# Patient Record
Sex: FEMALE | Race: OTHER | NOT HISPANIC OR LATINO | Employment: FULL TIME | URBAN - METROPOLITAN AREA
[De-identification: names, ages, dates, MRNs, and addresses within clinical notes are randomized per-mention and may not be internally consistent; named-entity substitution may affect disease eponyms.]

---

## 2017-07-10 ENCOUNTER — APPOINTMENT (OUTPATIENT)
Dept: LAB | Facility: CLINIC | Age: 42
End: 2017-07-10

## 2017-07-10 ENCOUNTER — ALLSCRIPTS OFFICE VISIT (OUTPATIENT)
Dept: OTHER | Facility: OTHER | Age: 42
End: 2017-07-10

## 2017-07-10 DIAGNOSIS — R30.0 DYSURIA: ICD-10-CM

## 2017-07-10 DIAGNOSIS — E11.9 TYPE 2 DIABETES MELLITUS WITHOUT COMPLICATIONS (HCC): ICD-10-CM

## 2017-07-10 LAB
ALBUMIN SERPL BCP-MCNC: 3.3 G/DL (ref 3.5–5)
ALP SERPL-CCNC: 66 U/L (ref 46–116)
ALT SERPL W P-5'-P-CCNC: 17 U/L (ref 12–78)
ANION GAP SERPL CALCULATED.3IONS-SCNC: 6 MMOL/L (ref 4–13)
AST SERPL W P-5'-P-CCNC: 9 U/L (ref 5–45)
BASOPHILS # BLD AUTO: 0.05 THOUSANDS/ΜL (ref 0–0.1)
BASOPHILS NFR BLD AUTO: 1 % (ref 0–1)
BILIRUB SERPL-MCNC: 0.36 MG/DL (ref 0.2–1)
BILIRUB UR QL STRIP: NEGATIVE
BUN SERPL-MCNC: 5 MG/DL (ref 5–25)
CALCIUM SERPL-MCNC: 9 MG/DL (ref 8.3–10.1)
CHLORIDE SERPL-SCNC: 104 MMOL/L (ref 100–108)
CHOLEST SERPL-MCNC: 165 MG/DL (ref 50–200)
CLARITY UR: CLEAR
CO2 SERPL-SCNC: 28 MMOL/L (ref 21–32)
COLOR UR: YELLOW
CREAT SERPL-MCNC: 0.56 MG/DL (ref 0.6–1.3)
EOSINOPHIL # BLD AUTO: 0.46 THOUSAND/ΜL (ref 0–0.61)
EOSINOPHIL NFR BLD AUTO: 5 % (ref 0–6)
ERYTHROCYTE [DISTWIDTH] IN BLOOD BY AUTOMATED COUNT: 15.2 % (ref 11.6–15.1)
EST. AVERAGE GLUCOSE BLD GHB EST-MCNC: 166 MG/DL
GFR SERPL CREATININE-BSD FRML MDRD: >60 ML/MIN/1.73SQ M
GLUCOSE P FAST SERPL-MCNC: 120 MG/DL (ref 65–99)
GLUCOSE UR STRIP-MCNC: NEGATIVE MG/DL
HBA1C MFR BLD: 7.4 % (ref 4.2–6.3)
HCT VFR BLD AUTO: 32.7 % (ref 34.8–46.1)
HDLC SERPL-MCNC: 49 MG/DL (ref 40–60)
HGB BLD-MCNC: 10.3 G/DL (ref 11.5–15.4)
HGB UR QL STRIP.AUTO: NEGATIVE
KETONES UR STRIP-MCNC: NEGATIVE MG/DL
LDLC SERPL CALC-MCNC: 91 MG/DL (ref 0–100)
LEUKOCYTE ESTERASE UR QL STRIP: NEGATIVE
LYMPHOCYTES # BLD AUTO: 3.41 THOUSANDS/ΜL (ref 0.6–4.47)
LYMPHOCYTES NFR BLD AUTO: 37 % (ref 14–44)
MCH RBC QN AUTO: 24.3 PG (ref 26.8–34.3)
MCHC RBC AUTO-ENTMCNC: 31.5 G/DL (ref 31.4–37.4)
MCV RBC AUTO: 77 FL (ref 82–98)
MONOCYTES # BLD AUTO: 0.38 THOUSAND/ΜL (ref 0.17–1.22)
MONOCYTES NFR BLD AUTO: 4 % (ref 4–12)
NEUTROPHILS # BLD AUTO: 4.88 THOUSANDS/ΜL (ref 1.85–7.62)
NEUTS SEG NFR BLD AUTO: 53 % (ref 43–75)
NITRITE UR QL STRIP: NEGATIVE
NRBC BLD AUTO-RTO: 0 /100 WBCS
PH UR STRIP.AUTO: 7 [PH] (ref 4.5–8)
PLATELET # BLD AUTO: 438 THOUSANDS/UL (ref 149–390)
PMV BLD AUTO: 9.8 FL (ref 8.9–12.7)
POTASSIUM SERPL-SCNC: 3.8 MMOL/L (ref 3.5–5.3)
PROT SERPL-MCNC: 7.7 G/DL (ref 6.4–8.2)
PROT UR STRIP-MCNC: NEGATIVE MG/DL
RBC # BLD AUTO: 4.24 MILLION/UL (ref 3.81–5.12)
SODIUM SERPL-SCNC: 138 MMOL/L (ref 136–145)
SP GR UR STRIP.AUTO: 1.01 (ref 1–1.03)
TRIGL SERPL-MCNC: 123 MG/DL
UROBILINOGEN UR QL STRIP.AUTO: 0.2 E.U./DL
WBC # BLD AUTO: 9.2 THOUSAND/UL (ref 4.31–10.16)

## 2017-07-10 PROCEDURE — 83036 HEMOGLOBIN GLYCOSYLATED A1C: CPT

## 2017-07-10 PROCEDURE — 36415 COLL VENOUS BLD VENIPUNCTURE: CPT

## 2017-07-10 PROCEDURE — 80061 LIPID PANEL: CPT

## 2017-07-10 PROCEDURE — 81003 URINALYSIS AUTO W/O SCOPE: CPT

## 2017-07-10 PROCEDURE — 80053 COMPREHEN METABOLIC PANEL: CPT

## 2017-07-10 PROCEDURE — 85025 COMPLETE CBC W/AUTO DIFF WBC: CPT

## 2018-01-15 VITALS
TEMPERATURE: 97.9 F | DIASTOLIC BLOOD PRESSURE: 72 MMHG | HEIGHT: 62 IN | BODY MASS INDEX: 27.02 KG/M2 | HEART RATE: 62 BPM | WEIGHT: 146.83 LBS | SYSTOLIC BLOOD PRESSURE: 108 MMHG

## 2018-07-23 RX ORDER — MELOXICAM 7.5 MG/1
7.5 TABLET ORAL
COMMUNITY
Start: 2018-05-21 | End: 2018-07-24 | Stop reason: ALTCHOICE

## 2018-07-23 RX ORDER — TRIAMCINOLONE ACETONIDE 1 MG/G
CREAM TOPICAL
COMMUNITY
Start: 2018-04-05 | End: 2018-07-24 | Stop reason: ALTCHOICE

## 2018-07-24 ENCOUNTER — OFFICE VISIT (OUTPATIENT)
Dept: FAMILY MEDICINE CLINIC | Facility: CLINIC | Age: 43
End: 2018-07-24
Payer: COMMERCIAL

## 2018-07-24 VITALS
HEIGHT: 62 IN | BODY MASS INDEX: 27.05 KG/M2 | WEIGHT: 147 LBS | RESPIRATION RATE: 16 BRPM | SYSTOLIC BLOOD PRESSURE: 110 MMHG | OXYGEN SATURATION: 98 % | HEART RATE: 88 BPM | DIASTOLIC BLOOD PRESSURE: 70 MMHG

## 2018-07-24 DIAGNOSIS — R35.0 URINE FREQUENCY: ICD-10-CM

## 2018-07-24 DIAGNOSIS — J35.8 TONSILLOLITH: ICD-10-CM

## 2018-07-24 DIAGNOSIS — E78.2 MIXED HYPERLIPIDEMIA: ICD-10-CM

## 2018-07-24 DIAGNOSIS — E55.9 VITAMIN D DEFICIENCY: ICD-10-CM

## 2018-07-24 DIAGNOSIS — F32.0 CURRENT MILD EPISODE OF MAJOR DEPRESSIVE DISORDER WITHOUT PRIOR EPISODE (HCC): ICD-10-CM

## 2018-07-24 DIAGNOSIS — E04.9 GOITER: ICD-10-CM

## 2018-07-24 DIAGNOSIS — E11.9 TYPE 2 DIABETES MELLITUS WITHOUT COMPLICATION, WITHOUT LONG-TERM CURRENT USE OF INSULIN (HCC): Primary | ICD-10-CM

## 2018-07-24 PROCEDURE — 99204 OFFICE O/P NEW MOD 45 MIN: CPT | Performed by: FAMILY MEDICINE

## 2018-07-24 RX ORDER — ERGOCALCIFEROL 1.25 MG/1
50000 CAPSULE ORAL WEEKLY
Refills: 0 | COMMUNITY
Start: 2018-04-20 | End: 2018-08-07 | Stop reason: ALTCHOICE

## 2018-07-24 RX ORDER — TIZANIDINE 4 MG/1
4 TABLET ORAL EVERY 8 HOURS PRN
Refills: 0 | COMMUNITY
Start: 2018-04-20 | End: 2018-07-24 | Stop reason: ALTCHOICE

## 2018-07-24 NOTE — PROGRESS NOTES
Assessment/Plan:  PHQ-9 Depression Screening    PHQ-9:    Frequency of the following problems over the past two weeks:       Little interest or pleasure in doing things:  3 - nearly every day  Feeling down, depressed, or hopeless:  3 - nearly every day  Trouble falling or staying asleep, or sleeping too much:  0 - not at all  Feeling tired or having little energy:  3 - nearly every day  Poor appetite or overeatin - more than half the days  Feeling bad about yourself - or that you are a failure or have let yourself or your family down:  3 - nearly every day  Trouble concentrating on things, such as reading the newspaper or watching television:  0 - not at all  Moving or speaking so slowly that other people could have noticed  Or the opposite - being so fidgety or restless that you have been moving around a lot more than usual:  0 - not at all  Thoughts that you would be better off dead, or of hurting yourself in some way:  0 - not at all  PHQ-2 Score:  6  PHQ-9 Score:  14         Problem List Items Addressed This Visit        Digestive    Tonsillolith    Relevant Orders    Ambulatory Referral to Otolaryngology       Endocrine    Type 2 diabetes mellitus without complication ([facility] ) - Primary    Relevant Orders    CBC and differential    Comprehensive metabolic panel    Hemoglobin A1C    Microalbumin / creatinine urine ratio    Goiter    Relevant Orders    TSH, 3rd generation    US thyroid       Other    Hyperlipidemia    Relevant Orders    Lipid panel    Urine frequency    Relevant Orders    UA w Reflex to Microscopic w Reflex to Culture -Lab Collect    Vitamin D deficiency    Relevant Orders    Vitamin D 25 hydroxy    Current mild episode of major depressive disorder without prior episode ([facility] )    Relevant Orders    Ambulatory referral to behavioral health therapists          Chief Complaint   Patient presents with    Establish Care       Subjective:   Patient ID: Makayla Lopez is a 38 y o  female      She is a new patient, she says she is diabetic , she is on metformin twice a day of for more than a year and her previous blood work was done more than a year ago, and she was poorly controlled diabetic with high A1c,   she complain of tonsil stone  Intermittently and she wants it to be checked   she had URI for 1 month and now she is feeling better still little sore throat  And she also has noted more prominence of her thyroid since then     History of on and off right-sided back pain in the kidney area, and frequency of urine  Uncontrolled diabetes on metformin twice a day 500 mg    she takes high dose vitamin-D once a week and she is out of that for last 1 week  she has history of anemia but she does not know the kind of anemia  she has history of depression and she feels less motivation, previously diagnosed depression but she is not seeing any counselor and she is not on any medication, she denies any suicidal or homicidal thoughts and 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,   she has 4 children   all normal deliveries and she has tubal ligation        Review of Systems   Constitutional: Negative for activity change, appetite change, chills, diaphoresis, fatigue, fever and unexpected weight change  HENT: Negative for congestion, dental problem, ear discharge, ear pain, facial swelling, hearing loss, mouth sores, nosebleeds, postnasal drip, rhinorrhea, sinus pain, sinus pressure, sneezing, sore throat, trouble swallowing and voice change  Tonsillar stone on and off    Eyes: Negative for photophobia, pain, discharge, redness and itching  Respiratory: Negative for cough, chest tightness, shortness of breath and wheezing  Cardiovascular: Negative for chest pain, palpitations and leg swelling  Gastrointestinal: Negative for abdominal distention, abdominal pain, blood in stool, constipation, diarrhea and nausea     Endocrine: Negative for cold intolerance, heat intolerance, polydipsia, polyphagia and polyuria  Genitourinary: Positive for flank pain and frequency  Negative for dysuria, hematuria and urgency  Musculoskeletal: Negative for arthralgias, back pain, myalgias and neck pain  Skin: Negative for color change and pallor  Allergic/Immunologic: Negative for environmental allergies and food allergies  Neurological: Negative for dizziness, weakness, light-headedness, numbness and headaches  Hematological: Negative for adenopathy  Does not bruise/bleed easily  Psychiatric/Behavioral: Negative for behavioral problems, sleep disturbance and suicidal ideas  The patient is not nervous/anxious  Objective:  Physical Exam   Constitutional: She is oriented to person, place, and time  She appears well-developed and well-nourished  HENT:   Head: Normocephalic and atraumatic  Right Ear: External ear normal    Left Ear: External ear normal    Nose: Nose normal    Mouth/Throat: Oropharynx is clear and moist  No oropharyngeal exudate  Eyes: Conjunctivae and EOM are normal  Pupils are equal, round, and reactive to light  Right eye exhibits no discharge  Left eye exhibits no discharge  No scleral icterus  Neck: Normal range of motion  Neck supple  No tracheal deviation present  No thyromegaly present  Cardiovascular: Normal rate, regular rhythm and normal heart sounds  No murmur heard  Pulses:       Dorsalis pedis pulses are 2+ on the right side, and 2+ on the left side  Pulmonary/Chest: Effort normal and breath sounds normal  No respiratory distress  She has no wheezes  She has no rales  Abdominal: Soft  Bowel sounds are normal  She exhibits no distension and no mass  There is no tenderness  There is no rebound  Musculoskeletal: Normal range of motion  She exhibits no edema  Feet:   Right Foot:   Skin Integrity: Negative for ulcer, skin breakdown, erythema, warmth, callus or dry skin     Left Foot:   Skin Integrity: Negative for ulcer, skin breakdown, erythema, warmth, callus or dry skin  Lymphadenopathy:     She has no cervical adenopathy  Neurological: She is alert and oriented to person, place, and time  She has normal reflexes  No cranial nerve deficit  Skin: Skin is warm  No rash noted  No erythema  No pallor  Psychiatric: She has a normal mood and affect  Her behavior is normal  Judgment and thought content normal    Nursing note and vitals reviewed  Patient's shoes and socks removed  Right Foot/Ankle   Right Foot Inspection  Skin Exam: skin normal skin not intact, no dry skin, no warmth, no callus, no erythema, no maceration, no abnormal color, no pre-ulcer, no ulcer and no callus                          Toe Exam: ROM and strength within normal limits  Sensory   Vibration: intact  Proprioception: intact   Monofilament testing: intact  Vascular  Capillary refills: < 3 seconds  The right DP pulse is 2+  Left Foot/Ankle  Left Foot Inspection  Skin Exam: skin normalskin not intact, no dry skin, no warmth, no erythema, no maceration, normal color, no pre-ulcer, no ulcer and no callus                         Toe Exam: ROM and strength within normal limits                   Sensory   Vibration: intact  Proprioception: intact  Monofilament: intact  Vascular  Capillary refills: < 3 seconds  The left DP pulse is 2+     Assign Risk Category:  ; ;        Risk: 0      Past Surgical History:   Procedure Laterality Date    TUBAL LIGATION      last assessed 7/10/17       Family History   Problem Relation Age of Onset    Diabetes Mother     Hypertension Mother     Heart disease Father         myocardial infarction    Diabetes Sister     Hypertension Sister     Diabetes Maternal Grandmother     Hypertension Maternal Grandmother     Substance Abuse Neg Hx     Mental illness Neg Hx          Current Outpatient Prescriptions:     metFORMIN (GLUCOPHAGE) 500 mg tablet, Take 500 mg by mouth, Disp: , Rfl:     ergocalciferol (VITAMIN D2) 50,000 units, Take 50,000 Units by mouth once a week, Disp: , Rfl: 0    Allergies   Allergen Reactions    Pollen Extract        Vitals:    07/24/18 0928   BP: 110/70   Pulse: 88   Resp: 16   SpO2: 98%   Weight: 66 7 kg (147 lb)   Height: 5' 2" (1 575 m)

## 2018-07-25 ENCOUNTER — HOSPITAL ENCOUNTER (OUTPATIENT)
Dept: ULTRASOUND IMAGING | Facility: HOSPITAL | Age: 43
Discharge: HOME | End: 2018-07-25
Attending: FAMILY MEDICINE
Payer: COMMERCIAL

## 2018-07-25 DIAGNOSIS — E04.9 GOITER: ICD-10-CM

## 2018-07-25 PROCEDURE — 76536 US EXAM OF HEAD AND NECK: CPT

## 2018-07-27 ENCOUNTER — TELEPHONE (OUTPATIENT)
Dept: FAMILY MEDICINE CLINIC | Facility: CLINIC | Age: 43
End: 2018-07-27

## 2018-07-30 NOTE — TELEPHONE ENCOUNTER
Spoke with her  that she has small nodule , will monitor ,she felt discomfort after US, advised ibuprofen prn and has f/u next wk

## 2018-08-02 LAB
25(OH)D3+25(OH)D2 SERPL-MCNC: 42.7 NG/ML (ref 30–100)
ALBUMIN SERPL-MCNC: 3.9 G/DL (ref 3.5–5.5)
ALBUMIN/CREAT UR: 26.7 MG/G CREAT (ref 0–30)
ALBUMIN/GLOB SERPL: 1.1 {RATIO} (ref 1.2–2.2)
ALP SERPL-CCNC: 77 IU/L (ref 39–117)
ALT SERPL-CCNC: 8 IU/L (ref 0–32)
AMBIG ABBREV DEFAULT: NORMAL
AMBIG ABBREV DEFAULT: NORMAL
APPEARANCE UR: ABNORMAL
AST SERPL-CCNC: 13 IU/L (ref 0–40)
BACTERIA UR CULT: NORMAL
BACTERIA URNS QL MICRO: ABNORMAL
BASOPHILS # BLD AUTO: 0 X10E3/UL (ref 0–0.2)
BASOPHILS NFR BLD AUTO: 0 %
BILIRUB SERPL-MCNC: 0.3 MG/DL (ref 0–1.2)
BILIRUB UR QL STRIP: NEGATIVE
BUN SERPL-MCNC: 8 MG/DL (ref 6–24)
BUN/CREAT SERPL: 14 (ref 9–23)
CALCIUM SERPL-MCNC: 9.8 MG/DL (ref 8.7–10.2)
CASTS URNS MICRO: ABNORMAL
CASTS URNS QL MICRO: PRESENT /LPF
CHLORIDE SERPL-SCNC: 101 MMOL/L (ref 96–106)
CHOLEST SERPL-MCNC: 161 MG/DL (ref 100–199)
CO2 SERPL-SCNC: 24 MMOL/L (ref 20–29)
COLOR UR: YELLOW
CREAT SERPL-MCNC: 0.58 MG/DL (ref 0.57–1)
CREAT UR-MCNC: 192.1 MG/DL
EOSINOPHIL # BLD AUTO: 0.1 X10E3/UL (ref 0–0.4)
EOSINOPHIL NFR BLD AUTO: 1 %
EPI CELLS #/AREA URNS HPF: >10 /HPF
ERYTHROCYTE [DISTWIDTH] IN BLOOD BY AUTOMATED COUNT: 14.1 % (ref 12.3–15.4)
GLOBULIN SER-MCNC: 3.7 G/DL (ref 1.5–4.5)
GLUCOSE SERPL-MCNC: 148 MG/DL (ref 65–99)
GLUCOSE UR QL: NEGATIVE
HBA1C MFR BLD: 6.9 % (ref 4.8–5.6)
HCT VFR BLD AUTO: 31.7 % (ref 34–46.6)
HDLC SERPL-MCNC: 46 MG/DL
HGB BLD-MCNC: 10.1 G/DL (ref 11.1–15.9)
HGB UR QL STRIP: ABNORMAL
IMM GRANULOCYTES # BLD: 0 X10E3/UL (ref 0–0.1)
IMM GRANULOCYTES NFR BLD: 0 %
KETONES UR QL STRIP: NEGATIVE
LDLC SERPL CALC-MCNC: 89 MG/DL (ref 0–99)
LEUKOCYTE ESTERASE UR QL STRIP: ABNORMAL
LYMPHOCYTES # BLD AUTO: 3.1 X10E3/UL (ref 0.7–3.1)
LYMPHOCYTES NFR BLD AUTO: 34 %
Lab: NO GROWTH
MCH RBC QN AUTO: 24.3 PG (ref 26.6–33)
MCHC RBC AUTO-ENTMCNC: 31.9 G/DL (ref 31.5–35.7)
MCV RBC AUTO: 76 FL (ref 79–97)
MICRO URNS: ABNORMAL
MICROALBUMIN UR-MCNC: 51.2 UG/ML
MONOCYTES # BLD AUTO: 0.5 X10E3/UL (ref 0.1–0.9)
MONOCYTES NFR BLD AUTO: 5 %
MUCOUS THREADS URNS QL MICRO: PRESENT
NEUTROPHILS # BLD AUTO: 5.4 X10E3/UL (ref 1.4–7)
NEUTROPHILS NFR BLD AUTO: 60 %
NITRITE UR QL STRIP: NEGATIVE
PH UR STRIP: 6.5 [PH] (ref 5–7.5)
PLATELET # BLD AUTO: 554 X10E3/UL (ref 150–379)
POTASSIUM SERPL-SCNC: 5.1 MMOL/L (ref 3.5–5.2)
PROT SERPL-MCNC: 7.6 G/DL (ref 6–8.5)
PROT UR QL STRIP: ABNORMAL
RBC # BLD AUTO: 4.15 X10E6/UL (ref 3.77–5.28)
RBC #/AREA URNS HPF: ABNORMAL /HPF
SL AMB EGFR AFRICAN AMERICAN: 130 ML/MIN/1.73
SL AMB EGFR NON AFRICAN AMERICAN: 112 ML/MIN/1.73
SL AMB URINALYSIS REFLEX: ABNORMAL
SL AMB VLDL CHOLESTEROL CALC: 26 MG/DL (ref 5–40)
SODIUM SERPL-SCNC: 140 MMOL/L (ref 134–144)
SP GR UR: 1.02 (ref 1–1.03)
TRIGL SERPL-MCNC: 130 MG/DL (ref 0–149)
TSH SERPL DL<=0.005 MIU/L-ACNC: 0.04 UIU/ML (ref 0.45–4.5)
UROBILINOGEN UR STRIP-ACNC: 0.2 EU/DL (ref 0.2–1)
WBC # BLD AUTO: 9.2 X10E3/UL (ref 3.4–10.8)
WBC #/AREA URNS HPF: ABNORMAL /HPF

## 2018-08-07 ENCOUNTER — OFFICE VISIT (OUTPATIENT)
Dept: FAMILY MEDICINE CLINIC | Facility: CLINIC | Age: 43
End: 2018-08-07
Payer: COMMERCIAL

## 2018-08-07 VITALS
SYSTOLIC BLOOD PRESSURE: 110 MMHG | BODY MASS INDEX: 26.24 KG/M2 | HEART RATE: 99 BPM | OXYGEN SATURATION: 98 % | RESPIRATION RATE: 16 BRPM | DIASTOLIC BLOOD PRESSURE: 78 MMHG | HEIGHT: 62 IN | WEIGHT: 142.6 LBS

## 2018-08-07 DIAGNOSIS — D64.9 ANEMIA, UNSPECIFIED TYPE: ICD-10-CM

## 2018-08-07 DIAGNOSIS — R79.89 LOW TSH LEVEL: ICD-10-CM

## 2018-08-07 DIAGNOSIS — R82.90 ABNORMAL URINE FINDING: ICD-10-CM

## 2018-08-07 DIAGNOSIS — E11.9 TYPE 2 DIABETES MELLITUS WITHOUT COMPLICATION, WITHOUT LONG-TERM CURRENT USE OF INSULIN (HCC): Primary | ICD-10-CM

## 2018-08-07 DIAGNOSIS — E04.1 THYROID NODULE: ICD-10-CM

## 2018-08-07 PROCEDURE — 99214 OFFICE O/P EST MOD 30 MIN: CPT | Performed by: FAMILY MEDICINE

## 2018-08-07 NOTE — ASSESSMENT & PLAN NOTE
She has low hemoglobin, she has history of low hemoglobin all her life, will do by more testing to find out if she has thalassemia minor

## 2018-08-07 NOTE — ASSESSMENT & PLAN NOTE
Urine culture is negative and she has abnormal finding in her urine closing blood and protein, cast she is advised to see the urologist

## 2018-08-07 NOTE — ASSESSMENT & PLAN NOTE
Lab Results   Component Value Date    HGBA1C 7 4 (H) 07/10/2017       No results for input(s): POCGLU in the last 72 hours      Blood Sugar Average: Last 72 hrs:   I will increase her metformin 3 times a day

## 2018-08-07 NOTE — ASSESSMENT & PLAN NOTE
She has thyroid nodule and she is feeling pressure in her neck and she has low TSH, advised to have complete thyroid check and see endocrinologist I will check her T3-T4,

## 2018-08-07 NOTE — PROGRESS NOTES
Assessment/Plan:    Problem List Items Addressed This Visit        Endocrine    Type 2 diabetes mellitus without complication ([facility] ) - Primary     Lab Results   Component Value Date    HGBA1C 7 4 (H) 07/10/2017       No results for input(s): POCGLU in the last 72 hours  Blood Sugar Average: Last 72 hrs:   I will increase her metformin 3 times a day         Relevant Medications    metFORMIN (GLUCOPHAGE) 500 mg tablet    Thyroid nodule     She has some symptoms with thyroid nodule, low TSH needs further evaluation by endocrinologist         Relevant Orders    Ambulatory referral to Endocrinology    Thyroid Antibodies Panel       Other    Anemia     She has low hemoglobin, she has history of low hemoglobin all her life, will do by more testing to find out if she has thalassemia minor         Relevant Orders    Thalassemia and Hemoglobinopathy Comp    Ferritin    Vitamin B12    Folate    Low TSH level     She has thyroid nodule and she is feeling pressure in her neck and she has low TSH, advised to have complete thyroid check and see endocrinologist I will check her T3-T4,         Relevant Orders    Ambulatory referral to Endocrinology    T3, free    T4, free    Thyroid Antibodies Panel    Abnormal urine finding     Urine culture is negative and she has abnormal finding in her urine closing blood and protein, cast she is advised to see the urologist          Relevant Orders    Ambulatory referral to Urology          Chief Complaint   Patient presents with    Follow-up       Subjective:   Patient ID: Jani Selby is a 38 y o  female  She is here follow-up on her labs and she is diabetic she takes metformin 500 mg twice a day for last few months    She says blood sugar at home are running from 110-145,  She denies any headache chest pain but she had ultrasound of the neck for the thyroid and that shows nodule, she says since she has thyroid ultrasound she started feeling the swelling in the neck was increased and she was feeling discomfort then she took ibuprofen cold compression swelling went down but still she can feel the nodule,  Denies any fever or chills  She also says that all her life she has been told she is anemic but she does not know what was her lowest hemoglobin  , she does not have any heavy periods, and she does not take any iron  She is not aware that if she has any thalassemia trait  She also says always she has some pinkish discharge in her vagina and she had gynecological exam and was normal,  She always feel her urine bones and she feels pressure in her bladder  Review of Systems   Constitutional: Negative for activity change, appetite change, chills, diaphoresis, fatigue, fever and unexpected weight change  HENT: Negative for congestion, dental problem, ear discharge, ear pain, facial swelling, hearing loss, mouth sores, nosebleeds, postnasal drip, rhinorrhea, sinus pain, sinus pressure, sneezing, sore throat, trouble swallowing and voice change  Eyes: Negative for photophobia, pain, discharge, redness and itching  Respiratory: Negative for cough, chest tightness, shortness of breath and wheezing  Cardiovascular: Negative for chest pain, palpitations and leg swelling  Gastrointestinal: Negative for abdominal distention, abdominal pain, blood in stool, constipation, diarrhea, nausea, rectal pain and vomiting  Endocrine: Positive for polyuria  Negative for cold intolerance, heat intolerance, polydipsia and polyphagia  Genitourinary: Positive for dysuria and vaginal discharge  Negative for flank pain, frequency, hematuria and urgency  Musculoskeletal: Negative for arthralgias, back pain, myalgias and neck pain  Skin: Negative for color change and pallor  Allergic/Immunologic: Negative for environmental allergies and food allergies  Neurological: Negative for dizziness, weakness, light-headedness, numbness and headaches  Hematological: Negative for adenopathy   Does not bruise/bleed easily  Psychiatric/Behavioral: Negative for behavioral problems, sleep disturbance and suicidal ideas  The patient is not nervous/anxious  Objective:  Physical Exam   Constitutional: She is oriented to person, place, and time  She appears well-developed and well-nourished  HENT:   Head: Normocephalic and atraumatic  Right Ear: External ear normal    Left Ear: External ear normal    Nose: Nose normal    Mouth/Throat: Oropharynx is clear and moist  No oropharyngeal exudate  Eyes: Conjunctivae and EOM are normal  Pupils are equal, round, and reactive to light  Right eye exhibits no discharge  Left eye exhibits no discharge  No scleral icterus  Neck: Trachea normal and normal range of motion  Neck supple  No tracheal deviation present  Thyromegaly present  Cardiovascular: Normal rate, regular rhythm and normal heart sounds  No murmur heard  Pulmonary/Chest: Effort normal and breath sounds normal  No respiratory distress  She has no wheezes  She has no rales  Abdominal: Soft  Bowel sounds are normal  She exhibits no distension and no mass  There is no tenderness  There is no rebound  Musculoskeletal: Normal range of motion  She exhibits no edema  Lymphadenopathy:     She has no cervical adenopathy  Neurological: She is alert and oriented to person, place, and time  She has normal reflexes  No cranial nerve deficit  Skin: Skin is warm  No rash noted  No erythema  No pallor  Psychiatric: She has a normal mood and affect  Her behavior is normal  Judgment and thought content normal    Nursing note and vitals reviewed           Past Surgical History:   Procedure Laterality Date    TUBAL LIGATION      last assessed 7/10/17       Family History   Problem Relation Age of Onset    Diabetes Mother     Hypertension Mother     Heart disease Father         myocardial infarction    Diabetes Sister     Hypertension Sister     Diabetes Maternal Grandmother     Hypertension Maternal Grandmother     Substance Abuse Neg Hx     Mental illness Neg Hx          Current Outpatient Prescriptions:     metFORMIN (GLUCOPHAGE) 500 mg tablet, 1 tablet 3 times a day with each meal, Disp: 270 tablet, Rfl: 0    Allergies   Allergen Reactions    Pollen Extract        Vitals:    08/07/18 1036   BP: 110/78   Pulse: 99   Resp: 16   SpO2: 98%   Weight: 64 7 kg (142 lb 9 6 oz)   Height: 5' 2" (1 575 m)

## 2018-08-17 ENCOUNTER — TELEPHONE (OUTPATIENT)
Dept: FAMILY MEDICINE CLINIC | Facility: CLINIC | Age: 43
End: 2018-08-17

## 2018-08-17 DIAGNOSIS — E11.9 TYPE 2 DIABETES MELLITUS WITHOUT COMPLICATION, WITHOUT LONG-TERM CURRENT USE OF INSULIN (HCC): ICD-10-CM

## 2018-08-17 NOTE — TELEPHONE ENCOUNTER
Patient called in to request a short supply of her metformin  She is in [place] on vacation and forgot her medication and her BS is running a little high   She would like us to call into the [facility] on [address] Phone: [phone]

## 2018-08-17 NOTE — TELEPHONE ENCOUNTER
30 tablets of 500mg Metformin has been sent to [facility]  Hopefully this fix her BS

## 2018-08-30 ENCOUNTER — OFFICE VISIT (OUTPATIENT)
Dept: FAMILY MEDICINE CLINIC | Facility: CLINIC | Age: 43
End: 2018-08-30
Payer: COMMERCIAL

## 2018-08-30 VITALS
RESPIRATION RATE: 16 BRPM | HEIGHT: 62 IN | DIASTOLIC BLOOD PRESSURE: 60 MMHG | WEIGHT: 142 LBS | HEART RATE: 84 BPM | OXYGEN SATURATION: 98 % | SYSTOLIC BLOOD PRESSURE: 108 MMHG | BODY MASS INDEX: 26.13 KG/M2

## 2018-08-30 DIAGNOSIS — E04.1 THYROID NODULE: ICD-10-CM

## 2018-08-30 DIAGNOSIS — L30.9 ECZEMA, UNSPECIFIED TYPE: Primary | ICD-10-CM

## 2018-08-30 DIAGNOSIS — N23 RENAL PAIN: ICD-10-CM

## 2018-08-30 PROCEDURE — 3008F BODY MASS INDEX DOCD: CPT | Performed by: FAMILY MEDICINE

## 2018-08-30 PROCEDURE — 99214 OFFICE O/P EST MOD 30 MIN: CPT | Performed by: FAMILY MEDICINE

## 2018-08-30 RX ORDER — TRIAMCINOLONE ACETONIDE 5 MG/G
CREAM TOPICAL 3 TIMES DAILY
Qty: 30 G | Refills: 0 | Status: SHIPPED | OUTPATIENT
Start: 2018-08-30 | End: 2019-01-10

## 2018-08-30 NOTE — ASSESSMENT & PLAN NOTE
Mild intermittent pain with abnormal urine finding, she will see urologist in December and I will order renal ultrasound

## 2018-08-30 NOTE — PROGRESS NOTES
Assessment/Plan:    Problem List Items Addressed This Visit        Endocrine    Thyroid nodule     Labs are still pending and she has made appointment with endocrinologist in December            Musculoskeletal and Integument    Eczema - Primary     Area of dry scaly skin on the right lower leg, advised topical steroid for 1-2 weeks         Relevant Medications    triamcinolone (KENALOG) 0 5 % cream    Other Relevant Orders    US retroperitoneal complete       Other    Renal pain     Mild intermittent pain with abnormal urine finding, she will see urologist in December and I will order renal ultrasound         Relevant Orders    US retroperitoneal complete          Chief Complaint   Patient presents with    Follow-up     requesting extra labs orders       Subjective:   Patient ID: Tawny Hassan is a 37 y o  female  She complains of a rash for 2-3 weeks on the right lower leg which has been increasing , dry skin, irritated,  She also complain of right renal pain on and off intermittently which is mild and last for hours and resolves its own she also has previously abnormal urine finding and she is referred to urologist, she has made appointment in 2 weeks  She has thyroid nodule and she made appointment with endocrinologist but the labs which were ordered for further evaluation of thyroid and anemia she could not do it yet  Review of Systems   Constitutional: Negative for activity change, appetite change, chills, diaphoresis, fatigue, fever and unexpected weight change  HENT: Negative for congestion, dental problem, ear discharge, ear pain, facial swelling, hearing loss, mouth sores, nosebleeds, postnasal drip, rhinorrhea, sinus pain, sinus pressure, sneezing, sore throat, trouble swallowing and voice change  Eyes: Negative for photophobia, pain, discharge, redness and itching  Respiratory: Negative for cough, chest tightness, shortness of breath and wheezing      Cardiovascular: Negative for chest pain, palpitations and leg swelling  Gastrointestinal: Negative for abdominal distention, abdominal pain, blood in stool, constipation, diarrhea and nausea  Endocrine: Negative for cold intolerance, heat intolerance, polydipsia, polyphagia and polyuria  Genitourinary: Negative for dysuria, flank pain, frequency, hematuria and urgency  Musculoskeletal: Negative for arthralgias, back pain, myalgias and neck pain  Skin: Positive for rash  Negative for color change and pallor  Dry scaly skin on the right lower leg about 2 cm   Allergic/Immunologic: Negative for environmental allergies and food allergies  Neurological: Negative for dizziness, weakness, light-headedness, numbness and headaches  Hematological: Negative for adenopathy  Does not bruise/bleed easily  Psychiatric/Behavioral: Negative for behavioral problems, self-injury, sleep disturbance and suicidal ideas  The patient is not nervous/anxious  Objective:  Physical Exam   Constitutional: She appears well-developed and well-nourished  HENT:   Head: Normocephalic and atraumatic  Mouth/Throat: Oropharynx is clear and moist    Eyes: Conjunctivae and EOM are normal  Pupils are equal, round, and reactive to light  No scleral icterus  Neck: Normal range of motion  Neck supple  No thyromegaly present  Cardiovascular: Normal rate, regular rhythm and normal heart sounds  Pulmonary/Chest: Effort normal and breath sounds normal  She has no wheezes  She has no rales  Abdominal: There is tenderness  Mild right CVA tenderness   Lymphadenopathy:     She has no cervical adenopathy  Neurological: She is alert  Skin: Rash noted  No erythema  About 2 cm dry scaly skin area on the right lower leg   Psychiatric: She has a normal mood and affect  Nursing note and vitals reviewed           Past Surgical History:   Procedure Laterality Date    TUBAL LIGATION      last assessed 7/10/17       Family History   Problem Relation Age of Onset    Diabetes Mother     Hypertension Mother     Heart disease Father         myocardial infarction    Diabetes Sister     Hypertension Sister     Diabetes Maternal Grandmother     Hypertension Maternal Grandmother     Substance Abuse Neg Hx     Mental illness Neg Hx          Current Outpatient Prescriptions:     metFORMIN (GLUCOPHAGE) 500 mg tablet, 1 tablet 3 times a day with each meal, Disp: 30 tablet, Rfl: 0    triamcinolone (KENALOG) 0 5 % cream, Apply topically 3 (three) times a day, Disp: 30 g, Rfl: 0    Allergies   Allergen Reactions    Pollen Extract        Vitals:    08/30/18 1010   BP: 108/60   Pulse: 84   Resp: 16   SpO2: 98%   Weight: 64 4 kg (142 lb)   Height: 5' 2" (1 575 m)

## 2018-09-10 NOTE — PROGRESS NOTES
9/13/2018    Alice Conway  1975  [id number]    Discussion and Plan    Dietary and hydration recommendations along with timed voids are recommended to minimize infection risk  I stressed the need for excellent glycemic control is this is a contributing factor  Renal bladder ultrasound will be obtained to rule out structural abnormalities  Urinalysis was normal today  Culture will also be sent  We discussed risks and benefits of a potential antibiotic prophylaxis depending on results of the above-mentioned studies  All questions answered at this time  1  Abnormal urine finding  - Ambulatory referral to Urology    Assessment      Patient Active Problem List   Diagnosis    Hyperlipidemia    Type 2 diabetes mellitus without complication (HCC)    Urine frequency    Depressive disorder    Anemia    Tonsillolith    Goiter    Vitamin D deficiency    Current mild episode of major depressive disorder without prior episode (HCC)    Low TSH level    Thyroid nodule    Abnormal urine finding    Eczema    Renal pain       History of Present Illness    Irving Carrera is a 37 y o  female seen today in regards to a history of recurrent urinary tract infections  She describes this primarily as frequency, dysuria, and bladder pressure  No gross hematuria  On 1 occasion she did have mild right flank pain  No prior history of nephrolithiasis  Denies prior history of genitourinary surgery  She is a type 2 diabetic having some difficulty maintaining appropriate glucose levels      Urinary Symptom Assessment        Past Medical History  Past Medical History:   Diagnosis Date    Diabetes mellitus ([address] )        Past Social History  Past Surgical History:   Procedure Laterality Date    TUBAL LIGATION      last assessed 7/10/17       Past Family History  Family History   Problem Relation Age of Onset    Diabetes Mother     Hypertension Mother     Heart disease Father         myocardial infarction    Diabetes Sister  Hypertension Sister     Diabetes Maternal Grandmother     Hypertension Maternal Grandmother     Substance Abuse Neg Hx     Mental illness Neg Hx        Past Social history  Social History     Social History    Marital status: /Civil Union     Spouse name: N/A    Number of children: N/A    Years of education: N/A     Occupational History    Not on file  Social History Main Topics    Smoking status: Never Smoker    Smokeless tobacco: Never Used    Alcohol use No    Drug use: No    Sexual activity: Not on file     Other Topics Concern    Not on file     Social History Narrative    No narrative on file       Current Medications  Current Outpatient Prescriptions   Medication Sig Dispense Refill    metFORMIN (GLUCOPHAGE) 500 mg tablet 1 tablet 3 times a day with each meal 30 tablet 0    triamcinolone (KENALOG) 0 5 % cream Apply topically 3 (three) times a day 30 g 0     No current facility-administered medications for this visit  Allergies  Allergies   Allergen Reactions    Pollen Extract        Past Medical History, Social History, Family History, medications and allergies were reviewed  Review of Systems  Review of Systems   Constitutional: Negative  HENT: Negative  Eyes: Negative  Respiratory: Negative  Cardiovascular: Negative  Gastrointestinal: Negative  Endocrine: Negative  Genitourinary: Positive for dysuria, flank pain and urgency  Negative for decreased urine volume, difficulty urinating and hematuria  Skin: Negative  Allergic/Immunologic: Negative  Neurological: Negative  Hematological: Negative  Psychiatric/Behavioral: Negative  Vitals  Vitals:    09/13/18 1405   BP: 118/70   BP Location: Right arm   Patient Position: Sitting   Cuff Size: Adult   Pulse: 75   Weight: 65 kg (143 lb 3 2 oz)         Physical Exam    Physical Exam   Constitutional: She is oriented to person, place, and time   She appears well-developed and well-nourished  HENT:   Head: Normocephalic and atraumatic  Eyes: Pupils are equal, round, and reactive to light  Neck: Normal range of motion  Cardiovascular: Normal rate, regular rhythm and normal heart sounds  Pulmonary/Chest: Effort normal and breath sounds normal    Abdominal: Soft  Bowel sounds are normal  She exhibits no distension  There is no tenderness  There is no rebound and no guarding  Musculoskeletal: Normal range of motion  Neurological: She is alert and oriented to person, place, and time  Skin: Skin is warm, dry and intact  Psychiatric: She has a normal mood and affect  Nursing note and vitals reviewed  Results    Below listed labs, pathology results, and radiology images were personally reviewed:    No results found for: PSA  Lab Results   Component Value Date    CALCIUM 9 0 07/10/2017     07/10/2017    K 3 8 07/10/2017    CO2 24 07/31/2018     07/31/2018    BUN 8 07/31/2018    CREATININE 0 58 07/31/2018     Lab Results   Component Value Date    WBC 9 2 07/31/2018    HGB 10 1 (L) 07/31/2018    HCT 31 7 (L) 07/31/2018    MCV 76 (L) 07/31/2018     (H) 07/31/2018       No results found for this or any previous visit (from the past 1 hour(s)) ]  Component      Latest Ref Rng & Units 7/10/2017 7/31/2018   Specific [place], UA      1 005 - 1 030 1 013 1 021   pH      5 0 - 7 5  6 5   Color, UA      Yellow Yellow Yellow   Urine Appearance      Clear  Cloudy (A)   SL AMB LEUKOCYTE ESTERASE URINE      Negative  1+ (A)   Protein      Negative/Trace  1+ (A)   Glucose, Urine      Negative  Negative   SL AMB KETONE, URINE, QUAL  Negative  Negative   SL AMB BLOOD, URINE      Negative  2+ (A)   SL AMB BILIRUBIN, URINE      Negative  Negative   Urobilinogen Urine      0 2 - 1 0 EU/dL  0 2   SL AMB NITRITES URINE, QUAL        Negative  Negative   MICROSCOPIC EXAMINATION (HISTORICAL)        See below:   Urinalysis Reflex        Comment     -US

## 2018-09-13 ENCOUNTER — OFFICE VISIT (OUTPATIENT)
Dept: UROLOGY | Facility: CLINIC | Age: 43
End: 2018-09-13
Payer: COMMERCIAL

## 2018-09-13 VITALS
WEIGHT: 143.2 LBS | BODY MASS INDEX: 26.19 KG/M2 | HEART RATE: 75 BPM | SYSTOLIC BLOOD PRESSURE: 118 MMHG | DIASTOLIC BLOOD PRESSURE: 70 MMHG

## 2018-09-13 DIAGNOSIS — R82.90 ABNORMAL URINE FINDING: Primary | ICD-10-CM

## 2018-09-13 LAB
SL AMB  POCT GLUCOSE, UA: ABNORMAL
SL AMB LEUKOCYTE ESTERASE,UA: ABNORMAL
SL AMB POCT BILIRUBIN,UA: ABNORMAL
SL AMB POCT BLOOD,UA: ABNORMAL
SL AMB POCT CLARITY,UA: CLEAR
SL AMB POCT COLOR,UA: YELLOW
SL AMB POCT KETONES,UA: ABNORMAL
SL AMB POCT NITRITE,UA: ABNORMAL
SL AMB POCT PH,UA: 6.5
SL AMB POCT SPECIFIC GRAVITY,UA: 1.01
SL AMB POCT URINE PROTEIN: ABNORMAL
SL AMB POCT UROBILINOGEN: ABNORMAL

## 2018-09-13 PROCEDURE — 99244 OFF/OP CNSLTJ NEW/EST MOD 40: CPT | Performed by: UROLOGY

## 2018-09-13 PROCEDURE — 87086 URINE CULTURE/COLONY COUNT: CPT | Performed by: UROLOGY

## 2018-09-13 PROCEDURE — 81002 URINALYSIS NONAUTO W/O SCOPE: CPT | Performed by: UROLOGY

## 2018-09-14 LAB — BACTERIA UR CULT: NORMAL

## 2018-10-08 ENCOUNTER — TELEPHONE (OUTPATIENT)
Dept: FAMILY MEDICINE CLINIC | Facility: CLINIC | Age: 43
End: 2018-10-08

## 2018-10-08 NOTE — TELEPHONE ENCOUNTER
Patient would like to know about and discuss her latest labwork  She would appreciate a call back from us

## 2018-10-09 LAB
ALPHA THAL REFLEX: ABNORMAL
DEPRECATED HGB OTHER BLD-IMP: 0 %
ERYTHROCYTE [DISTWIDTH] IN BLOOD BY AUTOMATED COUNT: 15.7 % (ref 12.3–15.4)
FERRITIN SERPL-MCNC: 5 NG/ML (ref 15–150)
FOLATE SERPL-MCNC: 15.8 NG/ML
HBA1 GENE MUT TESTED BLD/T: NORMAL
HCT VFR BLD AUTO: 31.8 % (ref 34–46.6)
HGB A MFR BLD: 1.5 % (ref 1.8–3.2)
HGB A MFR BLD: 98.5 % (ref 96.4–98.8)
HGB BLD-MCNC: 10.2 G/DL (ref 11.1–15.9)
HGB C MFR BLD: 0 %
HGB F MFR BLD: 0 % (ref 0–2)
HGB FRACT BLD-IMP: ABNORMAL
HGB S BLD QL SOLY: NEGATIVE
HGB S MFR BLD: 0 %
MCH RBC QN AUTO: 23.7 PG (ref 26.6–33)
MCHC RBC AUTO-ENTMCNC: 32.1 G/DL (ref 31.5–35.7)
MCV RBC AUTO: 74 FL (ref 79–97)
PLATELET # BLD AUTO: 469 X10E3/UL (ref 150–379)
RBC # BLD AUTO: 4.31 X10E6/UL (ref 3.77–5.28)
T3FREE SERPL-MCNC: 2.6 PG/ML (ref 2–4.4)
T4 FREE SERPL-MCNC: 1.17 NG/DL (ref 0.82–1.77)
THYROGLOB AB SERPL-ACNC: <1 IU/ML (ref 0–0.9)
THYROPEROXIDASE AB SERPL-ACNC: 8 IU/ML (ref 0–34)
VIT B12 SERPL-MCNC: 429 PG/ML (ref 232–1245)
WBC # BLD AUTO: 8.3 X10E3/UL (ref 3.4–10.8)

## 2018-10-09 NOTE — TELEPHONE ENCOUNTER
LABS DONE ON 9/26/2018  I DO NOT SEE ANY RESULTS NOTE? PT WOULD LIKE YOU TO CALL TO DISCUSS    I DID NOT DISCUSS ANYTHING BECAUSE THERE ARE A LOT OF ABNORMALS

## 2018-10-30 ENCOUNTER — TELEPHONE (OUTPATIENT)
Dept: UROLOGY | Facility: HOSPITAL | Age: 43
End: 2018-10-30

## 2018-11-01 NOTE — TELEPHONE ENCOUNTER
Please schedule for next available appointment at [facility] with NP/PA   If imaging comes back abnormal we will call her to move up appointment

## 2018-11-01 NOTE — TELEPHONE ENCOUNTER
Lm for pt to c/b to schedule  After apt is scheduled, please send to [facility] clinical pool so u/s can be triaged for possible sooner apt

## 2018-11-12 ENCOUNTER — HOSPITAL ENCOUNTER (OUTPATIENT)
Dept: ULTRASOUND IMAGING | Facility: HOSPITAL | Age: 43
Discharge: HOME | End: 2018-11-12
Attending: UROLOGY
Payer: COMMERCIAL

## 2018-11-12 DIAGNOSIS — R82.90 ABNORMAL URINE FINDING: ICD-10-CM

## 2018-11-12 PROCEDURE — 51798 US URINE CAPACITY MEASURE: CPT

## 2018-11-13 NOTE — TELEPHONE ENCOUNTER
Ultrasound from 11/12/18 final impression:  Unremarkable evaluation of kidneys and bladder  Called and informed patient ok to follow up on 12/26/18 as scheduled  Verbal understanding given

## 2019-01-10 ENCOUNTER — OFFICE VISIT (OUTPATIENT)
Dept: FAMILY MEDICINE CLINIC | Facility: CLINIC | Age: 44
End: 2019-01-10
Payer: COMMERCIAL

## 2019-01-10 VITALS
BODY MASS INDEX: 26.13 KG/M2 | HEART RATE: 96 BPM | DIASTOLIC BLOOD PRESSURE: 70 MMHG | RESPIRATION RATE: 16 BRPM | WEIGHT: 142 LBS | OXYGEN SATURATION: 97 % | SYSTOLIC BLOOD PRESSURE: 114 MMHG | HEIGHT: 62 IN

## 2019-01-10 DIAGNOSIS — R79.0 LOW FERRITIN: ICD-10-CM

## 2019-01-10 DIAGNOSIS — R42 DIZZINESS: ICD-10-CM

## 2019-01-10 DIAGNOSIS — E55.9 VITAMIN D DEFICIENCY: ICD-10-CM

## 2019-01-10 DIAGNOSIS — E11.9 TYPE 2 DIABETES MELLITUS WITHOUT COMPLICATION, WITHOUT LONG-TERM CURRENT USE OF INSULIN (HCC): Primary | ICD-10-CM

## 2019-01-10 DIAGNOSIS — H61.23 BILATERAL IMPACTED CERUMEN: ICD-10-CM

## 2019-01-10 DIAGNOSIS — R79.89 LOW TSH LEVEL: ICD-10-CM

## 2019-01-10 DIAGNOSIS — R07.81 RIB PAIN ON RIGHT SIDE: ICD-10-CM

## 2019-01-10 PROBLEM — N23 RENAL PAIN: Status: RESOLVED | Noted: 2018-08-30 | Resolved: 2019-01-10

## 2019-01-10 PROBLEM — J35.8 TONSILLOLITH: Status: RESOLVED | Noted: 2018-07-24 | Resolved: 2019-01-10

## 2019-01-10 PROCEDURE — 99214 OFFICE O/P EST MOD 30 MIN: CPT | Performed by: FAMILY MEDICINE

## 2019-01-10 NOTE — ASSESSMENT & PLAN NOTE
Lab Results   Component Value Date    HGBA1C 6 9 (H) 07/31/2018     Advised to get her labs and follow-up in 2 weeks  No results for input(s): POCGLU in the last 72 hours      Blood Sugar Average: Last 72 hrs:

## 2019-01-10 NOTE — ASSESSMENT & PLAN NOTE
There is a spot tenderness in the posterior part of the right chest wall at a rib, advised to get a rib x-ray, she has previous history of injury by a ball many years ago in this area

## 2019-01-10 NOTE — PROGRESS NOTES
Assessment/Plan:    Problem List Items Addressed This Visit        Endocrine    Type 2 diabetes mellitus without complication ([facility] [address] ) - Primary     Lab Results   Component Value Date    HGBA1C 6 9 (H) 07/31/2018     Advised to get her labs and follow-up in 2 weeks  No results for input(s): POCGLU in the last 72 hours  Blood Sugar Average: Last 72 hrs:           Relevant Orders    CBC and differential    Comprehensive metabolic panel    Hemoglobin A1C    Lipid panel       Nervous and Auditory    Bilateral impacted cerumen     Advised to use Debrox drops, and she will come back in 2 weeks then we can remove her ear wax            Other    Vitamin D deficiency     Will recheck vitamin-D level         Relevant Orders    Vitamin D 25 hydroxy    Low TSH level     Recheck TSH level         Relevant Orders    TSH, 3rd generation    Low ferritin    Relevant Orders    Ferritin    Dizziness     Dizziness could be impacted cerumen, will remove cerumen next time         Rib pain on right side     There is a spot tenderness in the posterior part of the right chest wall at a rib, advised to get a rib x-ray, she has previous history of injury by a ball many years ago in this area         Relevant Orders    XR ribs 2 vw right          Chief Complaint   Patient presents with    Dizziness    Chest Pain       Subjective:   Patient ID: Noemi Del Toro is a 37 y o  female  She complains of having pain in the posterior rib in 1 4 many years is intermittent and she says she had injury by a ball many years ago during soccer and she thinks it could be due to that  She denies any lump  She also complain of intermittent dizziness sometimes when she moves her neck, for few weeks  He has no nasal congestion fever chills or sore throat  She is diabetic on metformin and she has no A1c checked in few months    She has been diagnosed anemic and she takes iron twice a day since her last visit, her ferritin level was low, she also has history of some abnormality in her TSH, she says her periods are heavy in the 1st few days and then they are normal she wants to be checked by a gynecologist        Review of Systems   Constitutional: Negative for activity change, appetite change, chills, diaphoresis, fatigue, fever and unexpected weight change  HENT: Negative for congestion, dental problem, ear discharge, ear pain, facial swelling, hearing loss, mouth sores, nosebleeds, postnasal drip, rhinorrhea, sinus pain, sinus pressure, sneezing, sore throat, trouble swallowing and voice change  Eyes: Negative for photophobia, pain, discharge, redness and itching  Respiratory: Negative for cough, chest tightness, shortness of breath and wheezing  Cardiovascular: Negative for chest pain, palpitations and leg swelling  Pain in 1 of the rib in the posterior part of the right chest and it is intermittent   Gastrointestinal: Negative for abdominal distention, abdominal pain, blood in stool, constipation, diarrhea and nausea  Endocrine: Negative for cold intolerance, heat intolerance, polydipsia, polyphagia and polyuria  Genitourinary: Negative for dysuria, flank pain, frequency, hematuria and urgency  Musculoskeletal: Negative for arthralgias, back pain, myalgias and neck pain  Skin: Negative for color change and pallor  Allergic/Immunologic: Negative for environmental allergies and food allergies  Neurological: Positive for dizziness  Negative for weakness, light-headedness, numbness and headaches  Hematological: Negative for adenopathy  Does not bruise/bleed easily  Psychiatric/Behavioral: Negative for behavioral problems, sleep disturbance and suicidal ideas  The patient is not nervous/anxious  Objective:  Physical Exam   Constitutional: She is oriented to person, place, and time  She appears well-developed and well-nourished  HENT:   Head: Normocephalic and atraumatic     Nose: Nose normal    Mouth/Throat: Oropharynx is clear and moist  No oropharyngeal exudate  Bilateral cerumen impaction   Eyes: Pupils are equal, round, and reactive to light  Conjunctivae and EOM are normal  Right eye exhibits no discharge  Left eye exhibits no discharge  No scleral icterus  Neck: Normal range of motion  Neck supple  No tracheal deviation present  No thyromegaly present  Cardiovascular: Normal rate, regular rhythm and normal heart sounds  No murmur heard  Pulmonary/Chest: Effort normal and breath sounds normal  No respiratory distress  She has no wheezes  She has no rales  Musculoskeletal: Normal range of motion  She exhibits no edema  Lymphadenopathy:     She has no cervical adenopathy  Neurological: She is alert and oriented to person, place, and time  She has normal reflexes  No cranial nerve deficit  Skin: Skin is warm  No rash noted  No erythema  No pallor  Psychiatric: She has a normal mood and affect  Her behavior is normal  Judgment and thought content normal    Nursing note and vitals reviewed           Past Surgical History:   Procedure Laterality Date    TUBAL LIGATION      last assessed 7/10/17       Family History   Problem Relation Age of Onset    Diabetes Mother     Hypertension Mother     Heart disease Father         myocardial infarction    Diabetes Sister     Hypertension Sister     Diabetes Maternal Grandmother     Hypertension Maternal Grandmother     Substance Abuse Neg Hx     Mental illness Neg Hx          Current Outpatient Prescriptions:     metFORMIN (GLUCOPHAGE) 500 mg tablet, 1 tablet 3 times a day with each meal, Disp: 30 tablet, Rfl: 0    Allergies   Allergen Reactions    Pollen Extract        Vitals:    01/10/19 1444   BP: 114/70   Pulse: 96   Resp: 16   SpO2: 97%   Weight: 64 4 kg (142 lb)   Height: 5' 2" (1 575 m)

## 2019-01-23 LAB
25(OH)D3+25(OH)D2 SERPL-MCNC: 22 NG/ML (ref 30–100)
ALBUMIN SERPL-MCNC: 4.2 G/DL (ref 3.5–5.5)
ALBUMIN/GLOB SERPL: 1.4 {RATIO} (ref 1.2–2.2)
ALP SERPL-CCNC: 50 IU/L (ref 39–117)
ALT SERPL-CCNC: 10 IU/L (ref 0–32)
AST SERPL-CCNC: 12 IU/L (ref 0–40)
BASOPHILS # BLD AUTO: 0 X10E3/UL (ref 0–0.2)
BASOPHILS NFR BLD AUTO: 0 %
BILIRUB SERPL-MCNC: 0.3 MG/DL (ref 0–1.2)
BUN SERPL-MCNC: 9 MG/DL (ref 6–24)
BUN/CREAT SERPL: 15 (ref 9–23)
CALCIUM SERPL-MCNC: 9.1 MG/DL (ref 8.7–10.2)
CHLORIDE SERPL-SCNC: 102 MMOL/L (ref 96–106)
CHOLEST SERPL-MCNC: 174 MG/DL (ref 100–199)
CO2 SERPL-SCNC: 23 MMOL/L (ref 20–29)
CREAT SERPL-MCNC: 0.59 MG/DL (ref 0.57–1)
EOSINOPHIL # BLD AUTO: 0.1 X10E3/UL (ref 0–0.4)
EOSINOPHIL NFR BLD AUTO: 1 %
ERYTHROCYTE [DISTWIDTH] IN BLOOD BY AUTOMATED COUNT: 15.8 % (ref 12.3–15.4)
FERRITIN SERPL-MCNC: 10 NG/ML (ref 15–150)
GLOBULIN SER-MCNC: 3 G/DL (ref 1.5–4.5)
GLUCOSE SERPL-MCNC: 114 MG/DL (ref 65–99)
HBA1C MFR BLD: 6.8 % (ref 4.8–5.6)
HCT VFR BLD AUTO: 33.9 % (ref 34–46.6)
HDLC SERPL-MCNC: 57 MG/DL
HGB BLD-MCNC: 11.3 G/DL (ref 11.1–15.9)
IMM GRANULOCYTES # BLD: 0 X10E3/UL (ref 0–0.1)
IMM GRANULOCYTES NFR BLD: 0 %
LABCORP COMMENT: NORMAL
LDLC SERPL CALC-MCNC: 100 MG/DL (ref 0–99)
LYMPHOCYTES # BLD AUTO: 3.4 X10E3/UL (ref 0.7–3.1)
LYMPHOCYTES NFR BLD AUTO: 35 %
MCH RBC QN AUTO: 26.2 PG (ref 26.6–33)
MCHC RBC AUTO-ENTMCNC: 33.3 G/DL (ref 31.5–35.7)
MCV RBC AUTO: 79 FL (ref 79–97)
MONOCYTES # BLD AUTO: 0.5 X10E3/UL (ref 0.1–0.9)
MONOCYTES NFR BLD AUTO: 5 %
NEUTROPHILS # BLD AUTO: 5.7 X10E3/UL (ref 1.4–7)
NEUTROPHILS NFR BLD AUTO: 59 %
PLATELET # BLD AUTO: 384 X10E3/UL (ref 150–379)
POTASSIUM SERPL-SCNC: 4.4 MMOL/L (ref 3.5–5.2)
PROT SERPL-MCNC: 7.2 G/DL (ref 6–8.5)
RBC # BLD AUTO: 4.31 X10E6/UL (ref 3.77–5.28)
SL AMB EGFR AFRICAN AMERICAN: 128 ML/MIN/1.73
SL AMB EGFR NON AFRICAN AMERICAN: 111 ML/MIN/1.73
SL AMB VLDL CHOLESTEROL CALC: 17 MG/DL (ref 5–40)
SODIUM SERPL-SCNC: 140 MMOL/L (ref 134–144)
TRIGL SERPL-MCNC: 86 MG/DL (ref 0–149)
TSH SERPL DL<=0.005 MIU/L-ACNC: 0.99 UIU/ML (ref 0.45–4.5)
WBC # BLD AUTO: 9.8 X10E3/UL (ref 3.4–10.8)

## 2019-02-06 ENCOUNTER — OFFICE VISIT (OUTPATIENT)
Dept: FAMILY MEDICINE CLINIC | Facility: CLINIC | Age: 44
End: 2019-02-06
Payer: COMMERCIAL

## 2019-02-06 VITALS
TEMPERATURE: 99.4 F | HEART RATE: 90 BPM | SYSTOLIC BLOOD PRESSURE: 110 MMHG | RESPIRATION RATE: 16 BRPM | HEIGHT: 62 IN | OXYGEN SATURATION: 99 % | BODY MASS INDEX: 26.43 KG/M2 | WEIGHT: 143.6 LBS | DIASTOLIC BLOOD PRESSURE: 70 MMHG

## 2019-02-06 DIAGNOSIS — H61.23 BILATERAL IMPACTED CERUMEN: ICD-10-CM

## 2019-02-06 DIAGNOSIS — J11.1 INFLUENZA: Primary | ICD-10-CM

## 2019-02-06 PROCEDURE — 99213 OFFICE O/P EST LOW 20 MIN: CPT | Performed by: FAMILY MEDICINE

## 2019-02-06 NOTE — PROGRESS NOTES
Assessment/Plan:   Diagnoses and all orders for this visit:  Influenza  - advised supportive care with DayQuill/NyQuill, Robitussin for cough  - cautioned that DayQuill/NyQuill both have Acetaminophen in them   - can also take Ibuprofen Q6 PRN   - encouraged fluids, hot tea with honey to soothe the throat  - educated that can take 7-10days to resolve  - frequent hand-washing to prevent the spread of infection     Bilateral impacted cerumen  - OTC debrox and RTO for ear flush         Subjective:    Patient ID: Daryl Laurent is a 37 y o  female  35yo F presents to the office for eval of cough and bodyaches  - ongoing since Mon  - (+) fever ("really hot"), chills, body ache, cough productive for mucus, HA, itchy ears, sinus pressure, sore throat, abdominal cramping in lower quadrants  - feels as if "someone beat me very badly"   - denies CP/palpitations/SOB/wheezing/abd pain  - DayQuill/Ibuprofen and cold med OTC   - took Dayquill and Ibuprofen 200mg at 9am   - no Flu vaccine this season  - denies sick contacts   - poor appetite       The following portions of the patient's history were reviewed and updated as appropriate: allergies, current medications, past family history, past medical history, past social history, past surgical history and problem list     Review of Systems  as per HPI    Objective:  /70   Pulse 90   Temp 99 4 °F (37 4 °C)   Resp 16   Ht 5' 2" (1 575 m)   Wt 65 1 kg (143 lb 9 6 oz)   SpO2 99%   BMI 26 26 kg/m²    Physical Exam   Constitutional: She is oriented to person, place, and time  She appears well-developed and well-nourished  No distress  HENT:   Head: Normocephalic and atraumatic  Right Ear: External ear normal    Left Ear: External ear normal    Nose: Nose normal  No rhinorrhea or nose lacerations  Right sinus exhibits no maxillary sinus tenderness and no frontal sinus tenderness  Left sinus exhibits no maxillary sinus tenderness and no frontal sinus tenderness  Mouth/Throat: Uvula is midline, oropharynx is clear and moist and mucous membranes are normal  No uvula swelling  No oropharyngeal exudate, posterior oropharyngeal edema, posterior oropharyngeal erythema or tonsillar abscesses  B/l cerumen impaction    Eyes: Conjunctivae and EOM are normal  Right eye exhibits no discharge  Left eye exhibits no discharge  No scleral icterus  Neck: Normal range of motion  Neck supple  Cardiovascular: Normal rate, regular rhythm and normal heart sounds  Exam reveals no gallop and no friction rub  No murmur heard  Pulmonary/Chest: Effort normal and breath sounds normal  No respiratory distress  She has no wheezes  She has no rales  Abdominal: Soft  Bowel sounds are normal  She exhibits no distension  There is no tenderness  Musculoskeletal: Normal range of motion  Lymphadenopathy:     She has no cervical adenopathy  Neurological: She is alert and oriented to person, place, and time  Skin: Skin is warm  She is not diaphoretic  Psychiatric: She has a normal mood and affect  Her behavior is normal    Vitals reviewed

## 2019-02-07 ENCOUNTER — HOSPITAL ENCOUNTER (OUTPATIENT)
Dept: RADIOLOGY | Facility: HOSPITAL | Age: 44
Discharge: HOME | End: 2019-02-07
Attending: FAMILY MEDICINE
Payer: COMMERCIAL

## 2019-02-07 DIAGNOSIS — R07.81 RIB PAIN ON RIGHT SIDE: ICD-10-CM

## 2019-02-07 PROCEDURE — 71101 X-RAY EXAM UNILAT RIBS/CHEST: CPT

## 2019-02-14 ENCOUNTER — OFFICE VISIT (OUTPATIENT)
Dept: FAMILY MEDICINE CLINIC | Facility: CLINIC | Age: 44
End: 2019-02-14
Payer: COMMERCIAL

## 2019-02-14 VITALS
RESPIRATION RATE: 16 BRPM | BODY MASS INDEX: 25.76 KG/M2 | HEART RATE: 89 BPM | DIASTOLIC BLOOD PRESSURE: 70 MMHG | OXYGEN SATURATION: 98 % | WEIGHT: 140 LBS | HEIGHT: 62 IN | SYSTOLIC BLOOD PRESSURE: 100 MMHG

## 2019-02-14 DIAGNOSIS — H61.23 BILATERAL IMPACTED CERUMEN: ICD-10-CM

## 2019-02-14 DIAGNOSIS — D50.0 IRON DEFICIENCY ANEMIA DUE TO CHRONIC BLOOD LOSS: ICD-10-CM

## 2019-02-14 DIAGNOSIS — K21.9 GASTROESOPHAGEAL REFLUX DISEASE WITHOUT ESOPHAGITIS: Primary | ICD-10-CM

## 2019-02-14 DIAGNOSIS — E11.9 TYPE 2 DIABETES MELLITUS WITHOUT COMPLICATION, WITHOUT LONG-TERM CURRENT USE OF INSULIN (HCC): ICD-10-CM

## 2019-02-14 PROBLEM — R79.89 LOW TSH LEVEL: Status: RESOLVED | Noted: 2018-08-07 | Resolved: 2019-02-14

## 2019-02-14 PROCEDURE — 1036F TOBACCO NON-USER: CPT | Performed by: FAMILY MEDICINE

## 2019-02-14 PROCEDURE — 69210 REMOVE IMPACTED EAR WAX UNI: CPT | Performed by: FAMILY MEDICINE

## 2019-02-14 PROCEDURE — 99214 OFFICE O/P EST MOD 30 MIN: CPT | Performed by: FAMILY MEDICINE

## 2019-02-14 PROCEDURE — 3008F BODY MASS INDEX DOCD: CPT | Performed by: FAMILY MEDICINE

## 2019-02-14 RX ORDER — QUINIDINE GLUCONATE 324 MG
TABLET, EXTENDED RELEASE ORAL
Qty: 60 TABLET | Refills: 4 | Status: SHIPPED | OUTPATIENT
Start: 2019-02-14 | End: 2019-09-09 | Stop reason: SDUPTHER

## 2019-02-14 RX ORDER — DOCUSATE SODIUM 100 MG/1
CAPSULE, LIQUID FILLED ORAL
Qty: 30 CAPSULE | Refills: 4 | Status: SHIPPED | OUTPATIENT
Start: 2019-02-14 | End: 2019-09-09 | Stop reason: SDUPTHER

## 2019-02-14 RX ORDER — RANITIDINE 150 MG/1
150 TABLET ORAL 2 TIMES DAILY
Qty: 60 TABLET | Refills: 3 | Status: SHIPPED | OUTPATIENT
Start: 2019-02-14 | End: 2019-06-21

## 2019-02-14 NOTE — ASSESSMENT & PLAN NOTE
Advised to avoid spicy food, and small meals at a time and Zantac twice a day before meals and follow up if not better

## 2019-02-14 NOTE — ASSESSMENT & PLAN NOTE
She has low iron and iron deficiency anemia advised to take iron, advised to take ferrous gluconate and Colace to avoid constipation and will recheck her labs in 4 months

## 2019-02-14 NOTE — PROGRESS NOTES
Assessment/Plan:    Problem List Items Addressed This Visit        Digestive    Gastroesophageal reflux disease without esophagitis - Primary    Relevant Medications    ranitidine (ZANTAC) 150 mg tablet       Endocrine    Type 2 diabetes mellitus without complication (HCC)    Relevant Medications    metFORMIN (GLUCOPHAGE) 500 mg tablet    Other Relevant Orders    CBC and differential    Comprehensive metabolic panel    Hemoglobin A1C  Her A1c is 6 8 which is stable she will continue same medication metformin twice a day       Nervous and Auditory    Bilateral impacted cerumen    Relevant Orders    Ear cerumen removal  Bilateral cerumen removed  And she tolerated well       Other    Anemia    Relevant Medications    docusate sodium (COLACE) 100 mg capsule    ferrous gluconate (FERGON) 240 (27 FE) MG tablet          Chief Complaint   Patient presents with    Follow-up       Subjective:   Patient ID: Kale Osuna is a 37 y o  female  She is here for follow-up on her labs, she also has diabetes and take metformin twice a day she says if she takes 3 times a day she has low sugar,  She denies any headache chest pain shortness of breath she has slight dry cough which has been going on from her URI in and is much better  She complains of acid reflux and indigestion going on for few months, she denies any sore throat  She says when she eats bigger meal or drinks tea or coffee after the meal then she has more belching burping  She denies any abdominal pain mom itching  She also says has iron deficiency anemia due to heavy periods, she is waiting for gynecologist visit  She says she was advised to take iron but she took it intermittently but due to constipation with the iron she stopped taking it and she feels tired and dizzy some time due to her anemia  Review of Systems   Constitutional: Negative for activity change, appetite change, chills, diaphoresis, fatigue, fever and unexpected weight change     HENT: Negative for congestion, dental problem, ear discharge, ear pain, facial swelling, hearing loss, mouth sores, nosebleeds, postnasal drip, rhinorrhea, sinus pressure, sinus pain, sneezing, sore throat, trouble swallowing and voice change  Bilateral cerumen impaction and she wants to be removed she use Debrox drops   Eyes: Negative for photophobia, pain, discharge, redness and itching  Respiratory: Negative for cough, chest tightness, shortness of breath and wheezing  Cardiovascular: Negative for chest pain, palpitations and leg swelling  Gastrointestinal: Negative for abdominal distention, abdominal pain, blood in stool, constipation, diarrhea and nausea  Endocrine: Negative for cold intolerance, heat intolerance, polydipsia, polyphagia and polyuria  Genitourinary: Negative for dysuria, flank pain, frequency, hematuria and urgency  Musculoskeletal: Negative for arthralgias, back pain, myalgias and neck pain  Skin: Negative for color change and pallor  Allergic/Immunologic: Negative for environmental allergies and food allergies  Neurological: Negative for dizziness, weakness, light-headedness, numbness and headaches  Hematological: Negative for adenopathy  Does not bruise/bleed easily  Psychiatric/Behavioral: Negative for behavioral problems, sleep disturbance and suicidal ideas  The patient is not nervous/anxious  Objective:  Physical Exam   Constitutional: She is oriented to person, place, and time  She appears well-developed and well-nourished  HENT:   Head: Normocephalic and atraumatic  Nose: Nose normal    Mouth/Throat: Oropharynx is clear and moist  No oropharyngeal exudate  Bilateral cerumen impaction   Eyes: Pupils are equal, round, and reactive to light  Conjunctivae and EOM are normal  Right eye exhibits no discharge  Left eye exhibits no discharge  No scleral icterus  Neck: Normal range of motion  Neck supple  No tracheal deviation present   No thyromegaly present  Cardiovascular: Normal rate, regular rhythm and normal heart sounds  No murmur heard  Pulmonary/Chest: Effort normal and breath sounds normal  No respiratory distress  She has no wheezes  She has no rales  Abdominal: Soft  Bowel sounds are normal  She exhibits no distension and no mass  There is no tenderness  There is no rebound  Musculoskeletal: Normal range of motion  She exhibits no edema  Lymphadenopathy:     She has no cervical adenopathy  Neurological: She is alert and oriented to person, place, and time  She has normal reflexes  No cranial nerve deficit  Skin: Skin is warm  No rash noted  No erythema  No pallor  Psychiatric: She has a normal mood and affect  Her behavior is normal  Judgment and thought content normal    Nursing note and vitals reviewed  Ear cerumen removal  Date/Time: 2/14/2019 3:32 PM  Performed by: Sherryle Savers, MD  Authorized by: Sherryle Savers, MD     Patient location:  Clinic  Consent:     Consent obtained:  Verbal    Consent given by:  Patient    Risks discussed:  Dizziness, infection, pain, TM perforation and incomplete removal  Universal protocol:     Patient identity confirmed:  Verbally with patient  Procedure details:     Location:  External auditory canal    Procedure type: irrigation      Approach:  External    Equipment used:  Currette used to pull out wax after irrigation   Post-procedure details:     Complication:  None    Patient tolerance of procedure:   Tolerated well, no immediate complications          Past Surgical History:   Procedure Laterality Date    TUBAL LIGATION      last assessed 7/10/17       Family History   Problem Relation Age of Onset    Diabetes Mother     Hypertension Mother     Heart disease Father         myocardial infarction    Diabetes Sister     Hypertension Sister     Diabetes Maternal Grandmother     Hypertension Maternal Grandmother     Substance Abuse Neg Hx     Mental illness Neg Hx          Current Outpatient Medications:     docusate sodium (COLACE) 100 mg capsule, Take 1 tablet daily with the iron pills to avoid constipation, Disp: 30 capsule, Rfl: 4    ferrous gluconate (FERGON) 240 (27 FE) MG tablet, 1 tablet daily, Disp: 60 tablet, Rfl: 4    metFORMIN (GLUCOPHAGE) 500 mg tablet, Take 1 tablet (500 mg total) by mouth 2 (two) times a day with meals, Disp: 60 tablet, Rfl: 4    ranitidine (ZANTAC) 150 mg tablet, Take 1 tablet (150 mg total) by mouth 2 (two) times a day, Disp: 60 tablet, Rfl: 3    Allergies   Allergen Reactions    Pollen Extract        Vitals:    02/14/19 1442   BP: 100/70   Pulse: 89   Resp: 16   SpO2: 98%   Weight: 63 5 kg (140 lb)   Height: 5' 2" (1 575 m)

## 2019-06-21 ENCOUNTER — OFFICE VISIT (OUTPATIENT)
Dept: OBGYN CLINIC | Facility: CLINIC | Age: 44
End: 2019-06-21
Payer: COMMERCIAL

## 2019-06-21 VITALS
DIASTOLIC BLOOD PRESSURE: 68 MMHG | BODY MASS INDEX: 25.76 KG/M2 | HEIGHT: 62 IN | SYSTOLIC BLOOD PRESSURE: 110 MMHG | WEIGHT: 140 LBS

## 2019-06-21 DIAGNOSIS — Z92.89 HISTORY OF MAMMOGRAPHY, SCREENING: ICD-10-CM

## 2019-06-21 DIAGNOSIS — N63.12 BREAST LUMP ON RIGHT SIDE AT 2 O'CLOCK POSITION: ICD-10-CM

## 2019-06-21 DIAGNOSIS — R10.2 PELVIC PAIN: ICD-10-CM

## 2019-06-21 DIAGNOSIS — Z01.419 GYNECOLOGIC EXAM NORMAL: Primary | ICD-10-CM

## 2019-06-21 PROCEDURE — S0610 ANNUAL GYNECOLOGICAL EXAMINA: HCPCS | Performed by: PHYSICIAN ASSISTANT

## 2019-07-01 LAB
HPV HR 12 DNA CVX QL NAA+PROBE: NOT DETECTED
HPV16 DNA SPEC QL NAA+PROBE: NOT DETECTED
HPV18 DNA SPEC QL NAA+PROBE: NOT DETECTED
THIN PREP CVX: NORMAL

## 2019-07-05 ENCOUNTER — HOSPITAL ENCOUNTER (OUTPATIENT)
Dept: ULTRASOUND IMAGING | Facility: CLINIC | Age: 44
Discharge: HOME | End: 2019-07-05
Payer: COMMERCIAL

## 2019-07-05 ENCOUNTER — HOSPITAL ENCOUNTER (OUTPATIENT)
Dept: MAMMOGRAPHY | Facility: CLINIC | Age: 44
Discharge: HOME | End: 2019-07-05
Payer: COMMERCIAL

## 2019-07-05 VITALS — HEIGHT: 62 IN | BODY MASS INDEX: 25.76 KG/M2 | WEIGHT: 140 LBS

## 2019-07-05 DIAGNOSIS — N63.12 BREAST LUMP ON RIGHT SIDE AT 2 O'CLOCK POSITION: ICD-10-CM

## 2019-07-05 PROCEDURE — G0279 TOMOSYNTHESIS, MAMMO: HCPCS

## 2019-07-05 PROCEDURE — 77066 DX MAMMO INCL CAD BI: CPT

## 2019-07-05 PROCEDURE — 76642 ULTRASOUND BREAST LIMITED: CPT

## 2019-07-19 LAB
ALBUMIN SERPL-MCNC: 4 G/DL (ref 3.5–5.5)
ALBUMIN/GLOB SERPL: 1.4 {RATIO} (ref 1.2–2.2)
ALP SERPL-CCNC: 51 IU/L (ref 39–117)
ALT SERPL-CCNC: 6 IU/L (ref 0–32)
AST SERPL-CCNC: 11 IU/L (ref 0–40)
BASOPHILS # BLD AUTO: 0 X10E3/UL (ref 0–0.2)
BASOPHILS NFR BLD AUTO: 0 %
BILIRUB SERPL-MCNC: <0.2 MG/DL (ref 0–1.2)
BUN SERPL-MCNC: 8 MG/DL (ref 6–24)
BUN/CREAT SERPL: 13 (ref 9–23)
CALCIUM SERPL-MCNC: 9.1 MG/DL (ref 8.7–10.2)
CHLORIDE SERPL-SCNC: 103 MMOL/L (ref 96–106)
CO2 SERPL-SCNC: 25 MMOL/L (ref 20–29)
CREAT SERPL-MCNC: 0.64 MG/DL (ref 0.57–1)
EOSINOPHIL # BLD AUTO: 0.3 X10E3/UL (ref 0–0.4)
EOSINOPHIL NFR BLD AUTO: 4 %
ERYTHROCYTE [DISTWIDTH] IN BLOOD BY AUTOMATED COUNT: 14 % (ref 12.3–15.4)
GLOBULIN SER-MCNC: 2.9 G/DL (ref 1.5–4.5)
GLUCOSE SERPL-MCNC: 105 MG/DL (ref 65–99)
HBA1C MFR BLD: 6.4 % (ref 4.8–5.6)
HCT VFR BLD AUTO: 33 % (ref 34–46.6)
HGB BLD-MCNC: 10.9 G/DL (ref 11.1–15.9)
IMM GRANULOCYTES # BLD: 0 X10E3/UL (ref 0–0.1)
IMM GRANULOCYTES NFR BLD: 0 %
LYMPHOCYTES # BLD AUTO: 2.7 X10E3/UL (ref 0.7–3.1)
LYMPHOCYTES NFR BLD AUTO: 36 %
MCH RBC QN AUTO: 26.6 PG (ref 26.6–33)
MCHC RBC AUTO-ENTMCNC: 33 G/DL (ref 31.5–35.7)
MCV RBC AUTO: 81 FL (ref 79–97)
MONOCYTES # BLD AUTO: 0.4 X10E3/UL (ref 0.1–0.9)
MONOCYTES NFR BLD AUTO: 5 %
NEUTROPHILS # BLD AUTO: 4.2 X10E3/UL (ref 1.4–7)
NEUTROPHILS NFR BLD AUTO: 55 %
PLATELET # BLD AUTO: 353 X10E3/UL (ref 150–450)
POTASSIUM SERPL-SCNC: 5.1 MMOL/L (ref 3.5–5.2)
PROT SERPL-MCNC: 6.9 G/DL (ref 6–8.5)
RBC # BLD AUTO: 4.1 X10E6/UL (ref 3.77–5.28)
SL AMB EGFR AFRICAN AMERICAN: 125 ML/MIN/1.73
SL AMB EGFR NON AFRICAN AMERICAN: 108 ML/MIN/1.73
SODIUM SERPL-SCNC: 140 MMOL/L (ref 134–144)
WBC # BLD AUTO: 7.5 X10E3/UL (ref 3.4–10.8)

## 2019-07-26 ENCOUNTER — TELEPHONE (OUTPATIENT)
Dept: FAMILY MEDICINE CLINIC | Facility: CLINIC | Age: 44
End: 2019-07-26

## 2019-08-01 ENCOUNTER — TELEPHONE (OUTPATIENT)
Dept: FAMILY MEDICINE CLINIC | Facility: CLINIC | Age: 44
End: 2019-08-01

## 2019-08-01 NOTE — TELEPHONE ENCOUNTER
----- Message from Cynthia Anna MD sent at 8/1/2019  9:18 AM EDT -----  Please schedule office visit for review labs, she has anemia and impaired fasting glucose

## 2019-09-09 ENCOUNTER — OFFICE VISIT (OUTPATIENT)
Dept: FAMILY MEDICINE CLINIC | Facility: CLINIC | Age: 44
End: 2019-09-09
Payer: COMMERCIAL

## 2019-09-09 VITALS
HEIGHT: 62 IN | SYSTOLIC BLOOD PRESSURE: 120 MMHG | HEART RATE: 95 BPM | DIASTOLIC BLOOD PRESSURE: 70 MMHG | RESPIRATION RATE: 16 BRPM | BODY MASS INDEX: 26.68 KG/M2 | OXYGEN SATURATION: 99 % | WEIGHT: 145 LBS

## 2019-09-09 DIAGNOSIS — R42 DIZZINESS: ICD-10-CM

## 2019-09-09 DIAGNOSIS — D50.0 IRON DEFICIENCY ANEMIA DUE TO CHRONIC BLOOD LOSS: ICD-10-CM

## 2019-09-09 DIAGNOSIS — E11.9 TYPE 2 DIABETES MELLITUS WITHOUT COMPLICATION, WITHOUT LONG-TERM CURRENT USE OF INSULIN (HCC): Primary | ICD-10-CM

## 2019-09-09 PROBLEM — K21.9 GASTROESOPHAGEAL REFLUX DISEASE WITHOUT ESOPHAGITIS: Status: RESOLVED | Noted: 2019-02-14 | Resolved: 2019-09-09

## 2019-09-09 PROBLEM — R10.2 PELVIC PAIN: Status: RESOLVED | Noted: 2019-06-21 | Resolved: 2019-09-09

## 2019-09-09 PROCEDURE — 3008F BODY MASS INDEX DOCD: CPT | Performed by: FAMILY MEDICINE

## 2019-09-09 PROCEDURE — 99214 OFFICE O/P EST MOD 30 MIN: CPT | Performed by: FAMILY MEDICINE

## 2019-09-09 RX ORDER — QUINIDINE GLUCONATE 324 MG
TABLET, EXTENDED RELEASE ORAL
Qty: 60 TABLET | Refills: 6 | Status: SHIPPED | OUTPATIENT
Start: 2019-09-09 | End: 2022-03-31 | Stop reason: ALTCHOICE

## 2019-09-09 RX ORDER — DOCUSATE SODIUM 100 MG/1
CAPSULE, LIQUID FILLED ORAL
Qty: 30 CAPSULE | Refills: 6 | Status: SHIPPED | OUTPATIENT
Start: 2019-09-09 | End: 2020-11-16 | Stop reason: SDUPTHER

## 2019-09-09 NOTE — ASSESSMENT & PLAN NOTE
Discussed with her that she has symptoms due to iron deficiency anemia, she stop taking her iron advised to restart and will recheck in few months

## 2019-09-09 NOTE — PROGRESS NOTES
Assessment/Plan:    Problem List Items Addressed This Visit        Endocrine    Type 2 diabetes mellitus without complication ([facility] [address] ) - Primary     Lab Results   Component Value Date    HGBA1C 6 4 (H) 07/18/2019     CONT SAME ,   No results for input(s): POCGLU in the last 72 hours  Blood Sugar Average: Last 72 hrs:           Relevant Medications    metFORMIN (GLUCOPHAGE) 500 mg tablet    Other Relevant Orders     Diabetes Eye Exam (Completed)    Diabetic foot exam    CBC and differential    Comprehensive metabolic panel    Hemoglobin A1C    Lipid panel    TSH, 3rd generation    Microalbumin / creatinine urine ratio       Other    Anemia     Discussed with her that she has symptoms due to iron deficiency anemia, she stop taking her iron advised to restart and will recheck in few months         Relevant Medications    ferrous gluconate (FERGON) 240 (27 FE) MG tablet    docusate sodium (COLACE) 100 mg capsule    Other Relevant Orders    CBC and differential    Dizziness     She gets intermittent dizziness because of anemia, advised to restart iron tablet and recheck labs in few months         Relevant Orders    TSH, 3rd generation          Chief Complaint   Patient presents with    Follow-up       Subjective:   Patient ID: Star Myers is a 53 y o  female  She is here follow-up on diabetes, she is taking medicine for diabetes metformin, but she is not taking medicine for her anemia she says she forgets to take iron she has no constipation she only gets constipation if she gets her iron pills  she feels fatigue and she gets easily tired  she c/o bpdy ache , joint hurt some itmes       Review of Systems   Constitutional: Negative for activity change, appetite change, chills, diaphoresis, fatigue, fever and unexpected weight change     HENT: Negative for congestion, dental problem, ear discharge, ear pain, facial swelling, hearing loss, mouth sores, nosebleeds, postnasal drip, rhinorrhea, sinus pressure, sinus pain, sneezing, sore throat, trouble swallowing and voice change  Eyes: Negative for photophobia, pain, discharge, redness and itching  Respiratory: Negative for cough, chest tightness, shortness of breath and wheezing  Cardiovascular: Negative for chest pain, palpitations and leg swelling  Gastrointestinal: Negative for abdominal distention, abdominal pain, blood in stool, constipation, diarrhea and nausea  Endocrine: Negative for cold intolerance, heat intolerance, polydipsia, polyphagia and polyuria  Genitourinary: Negative for dysuria, flank pain, frequency, hematuria and urgency  Musculoskeletal: Negative for arthralgias, back pain, myalgias and neck pain  Skin: Negative for color change and pallor  Allergic/Immunologic: Negative for environmental allergies and food allergies  Neurological: Positive for dizziness  Negative for weakness, light-headedness, numbness and headaches  Hematological: Negative for adenopathy  Does not bruise/bleed easily  Psychiatric/Behavioral: Negative for behavioral problems, sleep disturbance and suicidal ideas  The patient is not nervous/anxious  Objective:  Physical Exam   Constitutional: She appears well-developed and well-nourished  HENT:   Head: Normocephalic and atraumatic  Mouth/Throat: Oropharynx is clear and moist    Eyes: Pupils are equal, round, and reactive to light  Conjunctivae and EOM are normal  No scleral icterus  Neck: Normal range of motion  Neck supple  No thyromegaly present  Cardiovascular: Normal rate, regular rhythm and normal heart sounds  Pulses:       Dorsalis pedis pulses are 2+ on the right side, and 2+ on the left side  Pulmonary/Chest: Effort normal and breath sounds normal  She has no wheezes  She has no rales  Feet:   Right Foot:   Skin Integrity: Negative for ulcer, skin breakdown, erythema, warmth, callus or dry skin     Left Foot:   Skin Integrity: Negative for ulcer, skin breakdown, erythema, warmth, callus or dry skin  Lymphadenopathy:     She has no cervical adenopathy  Neurological: She is alert  Skin: No rash noted  No erythema  Psychiatric: She has a normal mood and affect  Nursing note and vitals reviewed  Patient's shoes and socks removed  Right Foot/Ankle   Right Foot Inspection  Skin Exam: skin normal skin not intact, no dry skin, no warmth, no callus, no erythema, no maceration, no abnormal color, no pre-ulcer, no ulcer and no callus                          Toe Exam: ROM and strength within normal limits  Sensory   Vibration: intact  Proprioception: intact   Monofilament testing: intact  Vascular  Capillary refills: < 3 seconds  The right DP pulse is 2+  Left Foot/Ankle  Left Foot Inspection  Skin Exam: skin normalskin not intact, no dry skin, no warmth, no erythema, no maceration, normal color, no pre-ulcer, no ulcer and no callus                         Toe Exam: ROM and strength within normal limits                   Sensory   Vibration: intact  Proprioception: intact  Monofilament: intact  Vascular  Capillary refills: < 3 seconds  The left DP pulse is 2+     Assign Risk Category:  ; ;        Risk: 0        Past Surgical History:   Procedure Laterality Date    TUBAL LIGATION      last assessed 7/10/17       Family History   Problem Relation Age of Onset    Diabetes Mother     Hypertension Mother     Heart disease Father         myocardial infarction    Diabetes Sister     Hypertension Sister     Diabetes Maternal Grandmother     Hypertension Maternal Grandmother     No Known Problems Daughter     No Known Problems Maternal Grandfather     No Known Problems Paternal Grandmother     No Known Problems Paternal Grandfather     No Known Problems Sister     No Known Problems Sister     Leukemia Brother     No Known Problems Brother     No Known Problems Brother     No Known Problems Brother     No Known Problems Daughter     No Known Problems Son     No Known Problems Son     No Known Problems Maternal Aunt     No Known Problems Maternal Aunt     No Known Problems Maternal Aunt     No Known Problems Paternal Aunt     No Known Problems Paternal Aunt     No Known Problems Paternal Aunt     No Known Problems Paternal Uncle     No Known Problems Maternal Uncle     No Known Problems Maternal Uncle     No Known Problems Maternal Uncle     Breast cancer Cousin 28        Maternal    No Known Problems Brother     Leukemia Other     Substance Abuse Neg Hx     Mental illness Neg Hx          Current Outpatient Medications:     docusate sodium (COLACE) 100 mg capsule, Take 1 tablet daily with the iron pills to avoid constipation, Disp: 30 capsule, Rfl: 6    ferrous gluconate (FERGON) 240 (27 FE) MG tablet, 1 tablet daily, Disp: 60 tablet, Rfl: 6    metFORMIN (GLUCOPHAGE) 500 mg tablet, Take 1 tablet (500 mg total) by mouth 2 (two) times a day with meals, Disp: 60 tablet, Rfl: 4    Allergies   Allergen Reactions    Pollen Extract        Vitals:    09/09/19 1502   BP: 120/70   Pulse: 95   Resp: 16   SpO2: 99%   Weight: 65 8 kg (145 lb)   Height: 5' 2" (1 575 m)

## 2019-09-09 NOTE — ASSESSMENT & PLAN NOTE
Lab Results   Component Value Date    HGBA1C 6 4 (H) 07/18/2019     CONT SAME ,   No results for input(s): POCGLU in the last 72 hours      Blood Sugar Average: Last 72 hrs:

## 2019-09-09 NOTE — ASSESSMENT & PLAN NOTE
She gets intermittent dizziness because of anemia, advised to restart iron tablet and recheck labs in few months

## 2019-10-01 DIAGNOSIS — E04.9 GOITER: ICD-10-CM

## 2019-10-01 DIAGNOSIS — E04.1 THYROID NODULE: Primary | ICD-10-CM

## 2019-11-18 ENCOUNTER — OPTICAL OFFICE (OUTPATIENT)
Dept: URBAN - METROPOLITAN AREA CLINIC 146 | Facility: CLINIC | Age: 44
Setting detail: OPHTHALMOLOGY
End: 2019-11-18
Payer: COMMERCIAL

## 2019-11-18 ENCOUNTER — DOCTOR'S OFFICE (OUTPATIENT)
Dept: URBAN - METROPOLITAN AREA CLINIC 137 | Facility: CLINIC | Age: 44
Setting detail: OPHTHALMOLOGY
End: 2019-11-18
Payer: COMMERCIAL

## 2019-11-18 DIAGNOSIS — H52.13: ICD-10-CM

## 2019-11-18 DIAGNOSIS — H52.223: ICD-10-CM

## 2019-11-18 PROBLEM — E11.9 DIABETES TYPE 2 NO RETINOPATHY: Status: ACTIVE | Noted: 2019-11-18

## 2019-11-18 PROCEDURE — 92002 INTRM OPH EXAM NEW PATIENT: CPT | Performed by: OPTOMETRIST

## 2019-11-18 PROCEDURE — V2020 VISION SVCS FRAMES PURCHASES: HCPCS | Performed by: OPTOMETRIST

## 2019-11-18 PROCEDURE — V2103 SPHEROCYLINDR 4.00D/12-2.00D: HCPCS | Performed by: OPTOMETRIST

## 2019-11-18 PROCEDURE — V2100 LENS SPHER SINGLE PLANO 4.00: HCPCS | Performed by: OPTOMETRIST

## 2019-11-18 ASSESSMENT — REFRACTION_MANIFEST
OD_VA3: 20/
OS_VA1: 20/20
OU_VA: 20/
OS_SPHERE: -0.75
OS_VA2: 20/
OS_AXIS: 80
OD_VA1: 20/20
OS_VA3: 20/
OS_VA3: 20/
OU_VA: 20/
OD_VA3: 20/
OS_CYLINDER: -0.50
OD_SPHERE: -0.50
OD_CYLINDER: SPH
OS_VA2: 20/
OD_VA2: 20/
OD_VA2: 20/
OD_VA1: 20/
OS_VA1: 20/

## 2019-11-18 ASSESSMENT — REFRACTION_CURRENTRX
OD_OVR_VA: 20/
OS_OVR_VA: 20/
OD_OVR_VA: 20/
OD_OVR_VA: 20/

## 2019-11-18 ASSESSMENT — CONFRONTATIONAL VISUAL FIELD TEST (CVF)
OS_FINDINGS: FULL
OD_FINDINGS: FULL

## 2019-11-18 ASSESSMENT — VISUAL ACUITY
OS_BCVA: 20/20-1
OD_BCVA: 20/20-1

## 2019-11-18 ASSESSMENT — REFRACTION_AUTOREFRACTION
OS_AXIS: 073
OD_SPHERE: -0.75
OS_SPHERE: -0.75
OS_CYLINDER: -0.25

## 2019-11-18 ASSESSMENT — SPHEQUIV_DERIVED
OS_SPHEQUIV: -0.875
OS_SPHEQUIV: -1

## 2019-11-26 LAB
ALBUMIN SERPL-MCNC: 4.2 G/DL (ref 3.5–5.5)
ALBUMIN/CREAT UR: 12 MG/G CREAT (ref 0–30)
ALBUMIN/GLOB SERPL: 1.4 {RATIO} (ref 1.2–2.2)
ALP SERPL-CCNC: 51 IU/L (ref 39–117)
ALT SERPL-CCNC: 9 IU/L (ref 0–32)
AST SERPL-CCNC: 11 IU/L (ref 0–40)
BASOPHILS # BLD AUTO: 0 X10E3/UL (ref 0–0.2)
BASOPHILS NFR BLD AUTO: 0 %
BILIRUB SERPL-MCNC: 0.2 MG/DL (ref 0–1.2)
BUN SERPL-MCNC: 10 MG/DL (ref 6–24)
BUN/CREAT SERPL: 15 (ref 9–23)
CALCIUM SERPL-MCNC: 10 MG/DL (ref 8.7–10.2)
CHLORIDE SERPL-SCNC: 101 MMOL/L (ref 96–106)
CHOLEST SERPL-MCNC: 177 MG/DL (ref 100–199)
CO2 SERPL-SCNC: 23 MMOL/L (ref 20–29)
CREAT SERPL-MCNC: 0.65 MG/DL (ref 0.57–1)
CREAT UR-MCNC: 68.2 MG/DL
EOSINOPHIL # BLD AUTO: 0.1 X10E3/UL (ref 0–0.4)
EOSINOPHIL NFR BLD AUTO: 1 %
ERYTHROCYTE [DISTWIDTH] IN BLOOD BY AUTOMATED COUNT: 15.8 % (ref 12.3–15.4)
GLOBULIN SER-MCNC: 2.9 G/DL (ref 1.5–4.5)
GLUCOSE SERPL-MCNC: 124 MG/DL (ref 65–99)
HBA1C MFR BLD: 6.3 % (ref 4.8–5.6)
HCT VFR BLD AUTO: 34.7 % (ref 34–46.6)
HDLC SERPL-MCNC: 63 MG/DL
HGB BLD-MCNC: 11.5 G/DL (ref 11.1–15.9)
IMM GRANULOCYTES # BLD: 0 X10E3/UL (ref 0–0.1)
IMM GRANULOCYTES NFR BLD: 0 %
LDLC SERPL CALC-MCNC: 97 MG/DL (ref 0–99)
LYMPHOCYTES # BLD AUTO: 2.9 X10E3/UL (ref 0.7–3.1)
LYMPHOCYTES NFR BLD AUTO: 35 %
MCH RBC QN AUTO: 26.6 PG (ref 26.6–33)
MCHC RBC AUTO-ENTMCNC: 33.1 G/DL (ref 31.5–35.7)
MCV RBC AUTO: 80 FL (ref 79–97)
MICROALBUMIN UR-MCNC: 8.2 UG/ML
MONOCYTES # BLD AUTO: 0.5 X10E3/UL (ref 0.1–0.9)
MONOCYTES NFR BLD AUTO: 6 %
NEUTROPHILS # BLD AUTO: 4.6 X10E3/UL (ref 1.4–7)
NEUTROPHILS NFR BLD AUTO: 58 %
PLATELET # BLD AUTO: 429 X10E3/UL (ref 150–450)
POTASSIUM SERPL-SCNC: 5.9 MMOL/L (ref 3.5–5.2)
PROT SERPL-MCNC: 7.1 G/DL (ref 6–8.5)
RBC # BLD AUTO: 4.32 X10E6/UL (ref 3.77–5.28)
SL AMB EGFR AFRICAN AMERICAN: 123 ML/MIN/1.73
SL AMB EGFR NON AFRICAN AMERICAN: 107 ML/MIN/1.73
SL AMB VLDL CHOLESTEROL CALC: 17 MG/DL (ref 5–40)
SODIUM SERPL-SCNC: 139 MMOL/L (ref 134–144)
TRIGL SERPL-MCNC: 87 MG/DL (ref 0–149)
TSH SERPL DL<=0.005 MIU/L-ACNC: 1.22 UIU/ML (ref 0.45–4.5)
WBC # BLD AUTO: 8.1 X10E3/UL (ref 3.4–10.8)

## 2019-12-16 ENCOUNTER — OFFICE VISIT (OUTPATIENT)
Dept: FAMILY MEDICINE CLINIC | Facility: CLINIC | Age: 44
End: 2019-12-16
Payer: COMMERCIAL

## 2019-12-16 VITALS
RESPIRATION RATE: 16 BRPM | WEIGHT: 137.5 LBS | OXYGEN SATURATION: 98 % | BODY MASS INDEX: 25.3 KG/M2 | HEART RATE: 88 BPM | SYSTOLIC BLOOD PRESSURE: 110 MMHG | DIASTOLIC BLOOD PRESSURE: 68 MMHG | HEIGHT: 62 IN

## 2019-12-16 DIAGNOSIS — R79.0 LOW FERRITIN: ICD-10-CM

## 2019-12-16 DIAGNOSIS — E11.9 TYPE 2 DIABETES MELLITUS WITHOUT COMPLICATION, WITHOUT LONG-TERM CURRENT USE OF INSULIN (HCC): Primary | ICD-10-CM

## 2019-12-16 PROBLEM — R82.90 ABNORMAL URINE FINDING: Status: RESOLVED | Noted: 2018-08-07 | Resolved: 2019-12-16

## 2019-12-16 PROBLEM — R07.81 RIB PAIN ON RIGHT SIDE: Status: RESOLVED | Noted: 2019-01-10 | Resolved: 2019-12-16

## 2019-12-16 PROBLEM — R35.0 URINE FREQUENCY: Status: RESOLVED | Noted: 2017-07-10 | Resolved: 2019-12-16

## 2019-12-16 PROBLEM — H61.23 BILATERAL IMPACTED CERUMEN: Status: RESOLVED | Noted: 2019-01-10 | Resolved: 2019-12-16

## 2019-12-16 PROBLEM — F32.0 CURRENT MILD EPISODE OF MAJOR DEPRESSIVE DISORDER WITHOUT PRIOR EPISODE (HCC): Status: RESOLVED | Noted: 2018-07-24 | Resolved: 2019-12-16

## 2019-12-16 PROCEDURE — 3008F BODY MASS INDEX DOCD: CPT | Performed by: FAMILY MEDICINE

## 2019-12-16 PROCEDURE — 1036F TOBACCO NON-USER: CPT | Performed by: FAMILY MEDICINE

## 2019-12-16 PROCEDURE — 99214 OFFICE O/P EST MOD 30 MIN: CPT | Performed by: FAMILY MEDICINE

## 2019-12-16 NOTE — PROGRESS NOTES
Assessment/Plan:    Problem List Items Addressed This Visit        Endocrine    Type 2 diabetes mellitus without complication ([facility] [address] ) - Primary       Lab Results   Component Value Date    HGBA1C 6 3 (H) 11/25/2019    As she complain of having sometimes low sugar up to 100 when she is not eating well, advised to only take metformin when she is eating meal she is only taking snacks she should not take metformin  Recheck blood work in 4 months         Relevant Orders    CBC and differential    Comprehensive metabolic panel    Hemoglobin A1C    Microalbumin / creatinine urine ratio       Other    Low ferritin     Hemoglobin improved above 11, she will continue taking iron twice a day and continue Colace               Chief Complaint   Patient presents with    Follow-up       Subjective:   Patient ID: Pearl Otero is a 53 y o  female  SHE IS DIABETIC AND TAKING METFORMIN 500 MG TWICE A DAY, SHE SAYS AT HER WHAT SHE IS SO BUSY SOMETIMES SHE DOES NOT EACH PROPER BREAKFAST AND SOMETIMES VERY SMALL SNACK AND HER BLOOD SUGAR DROPS UP  AND SHE NEVER HAVE HYPOGLYCEMIA, AND SHE SAYS SHE HAD BREAST REDUCTION SURGERY ABOUT 3 WEEKS AGO AND AFTER THAT HER SYMPTOMS OF DIZZINESS NOT FEELING WELL WERE ENHANCED, SHE IS TAKING IRON TWICE A DAY AND HER HEMOGLOBIN HAS BEEN MAINTAINED  Review of Systems   Constitutional: Negative for activity change, appetite change, chills, diaphoresis, fatigue, fever and unexpected weight change  HENT: Negative for congestion, dental problem, ear discharge, ear pain, facial swelling, hearing loss, mouth sores, nosebleeds, postnasal drip, rhinorrhea, sinus pressure, sinus pain, sneezing, sore throat, trouble swallowing and voice change  Eyes: Negative for photophobia, pain, discharge, redness and itching  Respiratory: Negative for cough, chest tightness, shortness of breath and wheezing  Cardiovascular: Negative for chest pain, palpitations and leg swelling     Gastrointestinal: Negative for abdominal distention, abdominal pain, blood in stool, constipation, diarrhea and nausea  Endocrine: Negative for cold intolerance, heat intolerance, polydipsia, polyphagia and polyuria  Genitourinary: Negative for dysuria, flank pain, frequency, hematuria and urgency  Musculoskeletal: Negative for arthralgias, back pain, myalgias and neck pain  Skin: Negative for color change and pallor  Allergic/Immunologic: Negative for environmental allergies and food allergies  Neurological: Negative for dizziness, weakness, light-headedness, numbness and headaches  Hematological: Negative for adenopathy  Does not bruise/bleed easily  Psychiatric/Behavioral: Negative for behavioral problems, sleep disturbance and suicidal ideas  The patient is not nervous/anxious  Objective:  Physical Exam   Constitutional: She is oriented to person, place, and time  She appears well-developed and well-nourished  HENT:   Head: Normocephalic and atraumatic  Nose: Nose normal    Mouth/Throat: Oropharynx is clear and moist  No oropharyngeal exudate  Eyes: Pupils are equal, round, and reactive to light  Conjunctivae and EOM are normal  Right eye exhibits no discharge  Left eye exhibits no discharge  No scleral icterus  Neck: Normal range of motion  Neck supple  No tracheal deviation present  No thyromegaly present  Cardiovascular: Normal rate, regular rhythm and normal heart sounds  No murmur heard  Pulmonary/Chest: Effort normal and breath sounds normal  No respiratory distress  She has no wheezes  She has no rales  Abdominal: Soft  Bowel sounds are normal  She exhibits no distension and no mass  There is no tenderness  There is no rebound  Musculoskeletal: Normal range of motion  She exhibits no edema  Lymphadenopathy:     She has no cervical adenopathy  Neurological: She is alert and oriented to person, place, and time  She has normal reflexes  No cranial nerve deficit     Skin: Skin is warm  No rash noted  No erythema  No pallor  Psychiatric: She has a normal mood and affect  Her behavior is normal  Judgment and thought content normal    Nursing note and vitals reviewed  BMI Counseling: Body mass index is 26 52 kg/m²  The BMI is above normal  Nutrition recommendations include decreasing portion sizes, encouraging healthy choices of fruits and vegetables, decreasing fast food intake, consuming healthier snacks, limiting drinks that contain sugar, moderation in carbohydrate intake and reducing intake of cholesterol             Past Surgical History:   Procedure Laterality Date    REDUCTION MAMMAPLASTY Bilateral     11/27/19    TUBAL LIGATION      last assessed 7/10/17       Family History   Problem Relation Age of Onset    Diabetes Mother     Hypertension Mother     Heart disease Father         myocardial infarction    Diabetes Sister     Hypertension Sister     Diabetes Maternal Grandmother     Hypertension Maternal Grandmother     No Known Problems Daughter     No Known Problems Maternal Grandfather     No Known Problems Paternal Grandmother     No Known Problems Paternal Grandfather     No Known Problems Sister     No Known Problems Sister     Leukemia Brother     No Known Problems Brother     No Known Problems Brother     No Known Problems Brother     No Known Problems Daughter     No Known Problems Son     No Known Problems Son     No Known Problems Maternal Aunt     No Known Problems Maternal Aunt     No Known Problems Maternal Aunt     No Known Problems Paternal Aunt     No Known Problems Paternal Aunt     No Known Problems Paternal Aunt     No Known Problems Paternal Uncle     No Known Problems Maternal Uncle     No Known Problems Maternal Uncle     No Known Problems Maternal Uncle     Breast cancer Cousin 28        Maternal    No Known Problems Brother     Leukemia Other     Substance Abuse Neg Hx     Mental illness Neg Hx          Current Outpatient Medications:     docusate sodium (COLACE) 100 mg capsule, Take 1 tablet daily with the iron pills to avoid constipation, Disp: 30 capsule, Rfl: 6    ferrous gluconate (FERGON) 240 (27 FE) MG tablet, 1 tablet daily, Disp: 60 tablet, Rfl: 6    metFORMIN (GLUCOPHAGE) 500 mg tablet, Take 1 tablet (500 mg total) by mouth 2 (two) times a day with meals, Disp: 60 tablet, Rfl: 4    Allergies   Allergen Reactions    Pollen Extract        Vitals:    12/16/19 1450   BP: 110/68   Pulse: 88   Resp: 16   SpO2: 98%   Weight: 62 4 kg (137 lb 8 oz)   Height: 5' 2" (1 575 m)

## 2019-12-16 NOTE — ASSESSMENT & PLAN NOTE
Lab Results   Component Value Date    HGBA1C 6 3 (H) 11/25/2019    As she complain of having sometimes low sugar up to 100 when she is not eating well, advised to only take metformin when she is eating meal she is only taking snacks she should not take metformin    Recheck blood work in 4 months

## 2020-01-07 DIAGNOSIS — E11.9 TYPE 2 DIABETES MELLITUS WITHOUT COMPLICATION, WITHOUT LONG-TERM CURRENT USE OF INSULIN (HCC): ICD-10-CM

## 2020-01-20 ENCOUNTER — OFFICE VISIT (OUTPATIENT)
Dept: FAMILY MEDICINE CLINIC | Facility: CLINIC | Age: 45
End: 2020-01-20
Payer: COMMERCIAL

## 2020-01-20 VITALS
RESPIRATION RATE: 16 BRPM | OXYGEN SATURATION: 98 % | HEIGHT: 62 IN | HEART RATE: 88 BPM | WEIGHT: 137.4 LBS | DIASTOLIC BLOOD PRESSURE: 72 MMHG | SYSTOLIC BLOOD PRESSURE: 116 MMHG | BODY MASS INDEX: 25.28 KG/M2

## 2020-01-20 DIAGNOSIS — K30 INDIGESTION: ICD-10-CM

## 2020-01-20 DIAGNOSIS — Z23 NEED FOR TDAP VACCINATION: ICD-10-CM

## 2020-01-20 DIAGNOSIS — G47.00 INSOMNIA, UNSPECIFIED TYPE: ICD-10-CM

## 2020-01-20 DIAGNOSIS — Z12.11 SCREENING FOR COLON CANCER: ICD-10-CM

## 2020-01-20 DIAGNOSIS — R10.11 CHRONIC RUQ PAIN: Primary | ICD-10-CM

## 2020-01-20 DIAGNOSIS — G89.29 CHRONIC RUQ PAIN: Primary | ICD-10-CM

## 2020-01-20 DIAGNOSIS — M54.9 UPPER BACK PAIN ON RIGHT SIDE: ICD-10-CM

## 2020-01-20 PROCEDURE — 3008F BODY MASS INDEX DOCD: CPT | Performed by: FAMILY MEDICINE

## 2020-01-20 PROCEDURE — 90471 IMMUNIZATION ADMIN: CPT | Performed by: FAMILY MEDICINE

## 2020-01-20 PROCEDURE — 99214 OFFICE O/P EST MOD 30 MIN: CPT | Performed by: FAMILY MEDICINE

## 2020-01-20 PROCEDURE — 90715 TDAP VACCINE 7 YRS/> IM: CPT | Performed by: FAMILY MEDICINE

## 2020-01-20 NOTE — PROGRESS NOTES
Assessment/Plan:    Problem List Items Addressed This Visit        Other    Need for Tdap vaccination    Relevant Medications    tetanus-diphtheria-acellular pertussis (ADACEL) 5-2-15 5 LF-mcg/0 5 injection    Other Relevant Orders    TDAP VACCINE GREATER THAN OR EQUAL TO 8YO IM (Completed)    Chronic RUQ pain - Primary    Relevant Orders    US abdomen limited    Ambulatory referral to Gastroenterology    Indigestion    Relevant Orders    US abdomen limited    Ambulatory referral to Gastroenterology    Upper back pain on right side    Insomnia      Other Visit Diagnoses     Screening for colon cancer        Relevant Orders    Ambulatory referral to Gastroenterology          Chief Complaint   Patient presents with    Follow-up       Subjective:   Patient ID: Tree Chandler is a 53 y o  female  She has chronic intermittent pain in the right upper abdomen and on the back of her upper chest wall present in different areas for few years, she has been seen for this few times, chest x-ray and rib x-ray was normal, she also was seen by urologist , renal ultrasound was normal, she has no urine problem, she says she has always problem in digesting food, she always feel indigestion and delay digestion she never have ultrasound for the gallbladder  No evaluation by gastroenterologist  She also complain of difficulty in falling asleep if she wakes up in the middle of night she is unable to sleep  She does not drink too many caffeine drinks, she only drinks green tea        Review of Systems   Constitutional: Negative for activity change, appetite change, chills, diaphoresis, fatigue, fever and unexpected weight change  HENT: Negative for congestion, dental problem, ear discharge, ear pain, facial swelling, hearing loss, mouth sores, nosebleeds, postnasal drip, rhinorrhea, sinus pressure, sinus pain, sneezing, sore throat, trouble swallowing and voice change      Eyes: Negative for photophobia, pain, discharge, redness and itching  Respiratory: Negative for cough, chest tightness, shortness of breath and wheezing  Cardiovascular: Negative for chest pain, palpitations and leg swelling  Gastrointestinal: Negative for abdominal distention, abdominal pain, blood in stool, constipation, diarrhea and nausea  Indigestion feeling and right upper quadrant intermittent discomfort for years   Endocrine: Negative for cold intolerance, heat intolerance, polydipsia, polyphagia and polyuria  Genitourinary: Negative for dysuria, flank pain, frequency, hematuria and urgency  Musculoskeletal: Negative for arthralgias, back pain, myalgias and neck pain  Skin: Negative for color change and pallor  Allergic/Immunologic: Negative for environmental allergies and food allergies  Neurological: Negative for dizziness, weakness, light-headedness, numbness and headaches  Hematological: Negative for adenopathy  Does not bruise/bleed easily  Psychiatric/Behavioral: Negative for behavioral problems, sleep disturbance and suicidal ideas  The patient is not nervous/anxious  Objective:  Physical Exam   Constitutional: She is oriented to person, place, and time  She appears well-developed and well-nourished  HENT:   Head: Normocephalic and atraumatic  Mouth/Throat: Oropharynx is clear and moist  No oropharyngeal exudate  Eyes: Conjunctivae and EOM are normal  Right eye exhibits no discharge  Left eye exhibits no discharge  No scleral icterus  Neck: Normal range of motion  Neck supple  No tracheal deviation present  No thyromegaly present  Cardiovascular: Normal rate, regular rhythm and normal heart sounds  No murmur heard  Pulmonary/Chest: Effort normal and breath sounds normal  No respiratory distress  She has no wheezes  She has no rales  Abdominal: Soft  Bowel sounds are normal  She exhibits no distension and no mass  There is no tenderness  There is no rebound  Musculoskeletal: Normal range of motion   She exhibits no edema  Mild tenderness in the upper right back in few areas   Lymphadenopathy:     She has no cervical adenopathy  Neurological: She is alert and oriented to person, place, and time  She has normal reflexes  No cranial nerve deficit  Skin: Skin is warm  No rash noted  No erythema  No pallor  Psychiatric: She has a normal mood and affect  Her behavior is normal  Judgment and thought content normal    Nursing note and vitals reviewed           Past Surgical History:   Procedure Laterality Date    REDUCTION MAMMAPLASTY Bilateral     11/27/19    TUBAL LIGATION      last assessed 7/10/17       Family History   Problem Relation Age of Onset    Diabetes Mother     Hypertension Mother     Heart disease Father         myocardial infarction    Diabetes Sister     Hypertension Sister     Diabetes Maternal Grandmother     Hypertension Maternal Grandmother     No Known Problems Daughter     No Known Problems Maternal Grandfather     No Known Problems Paternal Grandmother     No Known Problems Paternal Grandfather     No Known Problems Sister     No Known Problems Sister     Leukemia Brother     No Known Problems Brother     No Known Problems Brother     No Known Problems Brother     No Known Problems Daughter     No Known Problems Son     No Known Problems Son     No Known Problems Maternal Aunt     No Known Problems Maternal Aunt     No Known Problems Maternal Aunt     No Known Problems Paternal Aunt     No Known Problems Paternal Aunt     No Known Problems Paternal Aunt     No Known Problems Paternal Uncle     No Known Problems Maternal Uncle     No Known Problems Maternal Uncle     No Known Problems Maternal Uncle     Breast cancer Cousin 28        Maternal    No Known Problems Brother     Leukemia Other     Substance Abuse Neg Hx     Mental illness Neg Hx          Current Outpatient Medications:     docusate sodium (COLACE) 100 mg capsule, Take 1 tablet daily with the iron pills to avoid constipation, Disp: 30 capsule, Rfl: 6    ferrous gluconate (FERGON) 240 (27 FE) MG tablet, 1 tablet daily, Disp: 60 tablet, Rfl: 6    metFORMIN (GLUCOPHAGE) 500 mg tablet, TAKE 1 TABLET BY MOUTH TWICE A DAY WITH MEALS, Disp: 180 tablet, Rfl: 1    tetanus-diphtheria-acellular pertussis (ADACEL) 5-2-15 5 LF-mcg/0 5 injection, Inject 0 5 mL into a muscle once for 1 dose, Disp: 0 5 mL, Rfl: 0    Allergies   Allergen Reactions    Pollen Extract        Vitals:    01/20/20 1043   BP: 116/72   Pulse: 88   Resp: 16   SpO2: 98%   Weight: 62 3 kg (137 lb 6 4 oz)   Height: 5' 2" (1 575 m)

## 2020-01-20 NOTE — ASSESSMENT & PLAN NOTE
The upper right back pain seems like musculoskeletal, discussed with her this is going on for years and this intermittent home, increased with activity most likely is just a muscle she should use Mich-Smith or topical lidocaine to improve her pain, her skin is looking normal and her chest x-ray and rib x-rays are normal renal ultrasound was normal

## 2020-01-27 ENCOUNTER — HOSPITAL ENCOUNTER (OUTPATIENT)
Dept: ULTRASOUND IMAGING | Facility: HOSPITAL | Age: 45
Discharge: HOME | End: 2020-01-27
Attending: FAMILY MEDICINE
Payer: COMMERCIAL

## 2020-01-27 DIAGNOSIS — R10.11 CHRONIC RUQ PAIN: ICD-10-CM

## 2020-01-27 DIAGNOSIS — G89.29 CHRONIC RUQ PAIN: ICD-10-CM

## 2020-01-27 DIAGNOSIS — K30 INDIGESTION: ICD-10-CM

## 2020-01-27 PROCEDURE — 76705 ECHO EXAM OF ABDOMEN: CPT

## 2020-01-30 ENCOUNTER — TELEPHONE (OUTPATIENT)
Dept: FAMILY MEDICINE CLINIC | Facility: CLINIC | Age: 45
End: 2020-01-30

## 2020-01-30 DIAGNOSIS — G89.29 CHRONIC RUQ PAIN: ICD-10-CM

## 2020-01-30 DIAGNOSIS — R10.11 CHRONIC RUQ PAIN: ICD-10-CM

## 2020-01-30 DIAGNOSIS — L90.5 SCAR: Primary | ICD-10-CM

## 2020-01-30 NOTE — TELEPHONE ENCOUNTER
Patient states she is still having the pain and wants to know where to go from here? Patient also wants to know if there is a scar cream you can prescribe for a scar from her surgery  She states the over the counter are to much money    Please advise

## 2020-01-30 NOTE — TELEPHONE ENCOUNTER
E scribed medderma cream ,   And made a referral to gastroenterologist to evaluate for her of right upper quadrant pain  Please inform the patient

## 2020-01-30 NOTE — TELEPHONE ENCOUNTER
----- Message from Josey Rock MD sent at 1/30/2020  2:57 PM EST -----  Please inform the patient that there is no significant new findings in her ultrasound

## 2020-02-13 ENCOUNTER — OFFICE VISIT (OUTPATIENT)
Dept: GASTROENTEROLOGY | Facility: AMBULARY SURGERY CENTER | Age: 45
End: 2020-02-13
Payer: COMMERCIAL

## 2020-02-13 VITALS
BODY MASS INDEX: 25.03 KG/M2 | HEART RATE: 80 BPM | HEIGHT: 62 IN | WEIGHT: 136 LBS | DIASTOLIC BLOOD PRESSURE: 78 MMHG | SYSTOLIC BLOOD PRESSURE: 110 MMHG | TEMPERATURE: 98.2 F

## 2020-02-13 DIAGNOSIS — Z12.11 SCREENING FOR COLON CANCER: ICD-10-CM

## 2020-02-13 DIAGNOSIS — G89.29 CHRONIC RUQ PAIN: ICD-10-CM

## 2020-02-13 DIAGNOSIS — K83.8 COMMON BILE DUCT DILATION: Primary | ICD-10-CM

## 2020-02-13 DIAGNOSIS — K59.00 CONSTIPATION, UNSPECIFIED CONSTIPATION TYPE: ICD-10-CM

## 2020-02-13 DIAGNOSIS — K30 INDIGESTION: ICD-10-CM

## 2020-02-13 DIAGNOSIS — R10.11 CHRONIC RUQ PAIN: ICD-10-CM

## 2020-02-13 PROCEDURE — 99244 OFF/OP CNSLTJ NEW/EST MOD 40: CPT | Performed by: INTERNAL MEDICINE

## 2020-02-13 NOTE — LETTER
February 13, 2020     Tez Stockton MD  [address] [address] [facility] [address]  [place] [address]    Patient: Glendy Rey   YOB: 1975   Date of Visit: 2/13/2020       Dear Dr Maksim Panda:    Thank you for referring Glendy Rey to me for evaluation  Below are my notes for this consultation  If you have questions, please do not hesitate to call me  I look forward to following your patient along with you  Sincerely,        Kenji Donahue MD        CC: No Recipients  Kenji Donahue MD  2/13/2020  4:55 PM  Sign at close encounter  Consultation - [address] [facility]  Glendy Rey 53 y o  female MRN: [id number]  Unit/Bed#:  Encounter: [id number]        Consults    ASSESSMENT/PLAN:       1  Right-sided abdominal pain-he likely musculoskeletal however patient is noted to have dilated common bile duct to 8 mm on most recent ultrasound, there is no evidence of choledocholithiasis  No obvious biliary obstruction noted on the ultrasound   -will obtain MRI/MRCP for further evaluation for biliary stricture versus small sludge or stone   -fortunately, liver function tests are normal     2  Dyspepsia symptoms-may be secondary to H pylori gastritis versus peptic ulcer disease   -Patient was explained about the lifestyle and dietary modifications  Advised to avoid fatty foods, chocolates, caffeine, alcohol and any other triggering foods  Avoid eating for at least 3 hours before going to bed   -avoid NSAIDs       -consider starting over-the-counter Pepcid on daily basis  -will plan for EGD to assess for peptic ulcer disease and gastritis  3  Colon cancer screening-based on American Cancer society recommendations, would recommend colonoscopy at age 37 for approved by insurance  Patient was explained about  the risks and benefits of the procedure  Risks including but not limited to bleeding, infection, perforation were explained in detail   Also explained about less than 100% sensitivity with the exam and other alternatives  4  Chronic constipation-likely slow transit constipation in setting of inadequate fiber intake   -check magnesium levels, TSH was normal recently, calcium was normal   -discussed starting on Metamucil 1 tbsp daily  Increase water intake to 64 oz daily  ______________________________________________________________________    Reason for Consult / Principal Problem: [unfilled]    HPI: Alvaro Ramirez is a 53y o  year old female With history of type 2 diabetes, hyperlipidemia, depressive disorder, vitamin-D deficiency, presents for  Evaluation of chronic right-sided abdominal pain  She underwent right upper quadrant ultrasound which was notable for dilated CBD at 8 mm without any evidence of choledocholithiasis, no intrahepatic biliary dilatation  Liver appears normal, no portal vein thrombus  No suspicious liver lesions  Patient reports the pain is worsened with lifting and movement, no association with food intake or passage of bowel movements  She does report chronic constipation however this is unchanged  She also reports increased belching, and borborygmi  Denies any acid reflux, hematemesis, coffee-ground emesis or melena  She denies family history of colon cancer, gastric cancer or pancreatic cancer  Labs are reviewed and are notable for normal transaminases with AST of 11, ALT 9, bilirubin of 0 2, hemoglobin 11 5, platelets 480  TSH is 1 2  Review of Systems: The remainder of the review of systems was negative except for the pertinent positives noted in HPI       Historical Information   Past Medical History:   Diagnosis Date    Diabetes mellitus ([facility] [address] )      Past Surgical History:   Procedure Laterality Date    REDUCTION MAMMAPLASTY Bilateral     11/27/19    TUBAL LIGATION      last assessed 7/10/17     Social History   Social History     Substance and Sexual Activity   Alcohol Use No     Social History     Substance and Sexual Activity   Drug Use No     Social History     Tobacco Use   Smoking Status Never Smoker   Smokeless Tobacco Never Used     Family History   Problem Relation Age of Onset    Diabetes Mother     Hypertension Mother     Heart disease Father         myocardial infarction    Diabetes Sister     Hypertension Sister     Diabetes Maternal Grandmother     Hypertension Maternal Grandmother     No Known Problems Daughter     No Known Problems Maternal Grandfather     No Known Problems Paternal Grandmother     No Known Problems Paternal Grandfather     No Known Problems Sister     No Known Problems Sister     Leukemia Brother     No Known Problems Brother     No Known Problems Brother     No Known Problems Brother     No Known Problems Daughter     No Known Problems Son     No Known Problems Son     No Known Problems Maternal Aunt     No Known Problems Maternal Aunt     No Known Problems Maternal Aunt     No Known Problems Paternal Aunt     No Known Problems Paternal Aunt     No Known Problems Paternal Aunt     No Known Problems Paternal Uncle     No Known Problems Maternal Uncle     No Known Problems Maternal Uncle     No Known Problems Maternal Uncle     Breast cancer Cousin 28        Maternal    No Known Problems Brother     Leukemia Other     Substance Abuse Neg Hx     Mental illness Neg Hx        Meds/Allergies       (Not in a hospital admission)  No current facility-administered medications for this visit  Allergies   Allergen Reactions    Pollen Extract        Objective     Blood pressure 110/78, pulse 80, temperature 98 2 °F (36 8 °C), temperature source Tympanic, height 5' 2" (1 575 m), weight 61 7 kg (136 lb)      [unfilled]    PHYSICAL EXAM     GEN: well nourished, well developed, no acute distress  HEENT: anicteric, MMM  CV: RRR, no m/r/g  CHEST: CTA b/l, no WRR  ABD: +BS, soft, NT/ND, no hepatosplenomegaly  EXT: no c/c/e  SKIN: no rashes,  NEURO: aaox3    Lab Results:   No visits with results within 1 Day(s) from this visit  Latest known visit with results is:   Orders Only on 11/25/2019   Component Date Value    White Blood Cell Count 11/25/2019 8 1     Red Blood Cell Count 11/25/2019 4 32     Hemoglobin 11/25/2019 11 5     HCT 11/25/2019 34 7     MCV 11/25/2019 80     MCH 11/25/2019 26 6     MCHC 11/25/2019 33 1     RDW 11/25/2019 15 8*    Platelet Count 70/41/9055 429     Neutrophils 11/25/2019 58     Lymphocytes 11/25/2019 35     Monocytes 11/25/2019 6     Eosinophils 11/25/2019 1     Basophils PCT 11/25/2019 0     Neutrophils (Absolute) 11/25/2019 4 6     Lymphocytes (Absolute) 11/25/2019 2 9     Monocytes (Absolute) 11/25/2019 0 5     Eosinophils (Absolute) 11/25/2019 0 1     Basophils ABS 11/25/2019 0 0     Immature Granulocytes 11/25/2019 0     Immature Granulocytes (A* 11/25/2019 0 0     Glucose, Random 11/25/2019 124*    BUN 11/25/2019 10     Creatinine 11/25/2019 0 65     eGFR Non  11/25/2019 107     eGFR  11/25/2019 123     SL AMB BUN/CREATININE RA* 11/25/2019 15     Sodium 11/25/2019 139     Potassium 11/25/2019 5 9*    Chloride 11/25/2019 101     CO2 11/25/2019 23     CALCIUM 11/25/2019 10 0     Protein, Total 11/25/2019 7 1     Albumin 11/25/2019 4 2     Globulin, Total 11/25/2019 2 9     Albumin/Globulin Ratio 11/25/2019 1 4     TOTAL BILIRUBIN 11/25/2019 0 2     Alk Phos Isoenzymes 11/25/2019 51     AST 11/25/2019 11     ALT 11/25/2019 9     Cholesterol, Total 11/25/2019 177     Triglycerides 11/25/2019 87     HDL 11/25/2019 63     VLDL Cholesterol Calcula* 11/25/2019 17     LDL Direct 11/25/2019 97     Creatinine, Urine 11/25/2019 68 2     Microalbum  ,U,Random 11/25/2019 8 2     Microalb/Creat Ratio 11/25/2019 12 0     Hemoglobin A1C 11/25/2019 6 3*    TSH 11/25/2019 1 220      Imaging Studies: I have personally reviewed pertinent films in PACS

## 2020-02-13 NOTE — PROGRESS NOTES
Consultation - [facility]  Federico Mcknight 53 y o  female MRN: [id number]  Unit/Bed#:  Encounter: [id number]        Consults    ASSESSMENT/PLAN:       1  Right-sided abdominal pain-he likely musculoskeletal however patient is noted to have dilated common bile duct to 8 mm on most recent ultrasound, there is no evidence of choledocholithiasis  No obvious biliary obstruction noted on the ultrasound   -will obtain MRI/MRCP for further evaluation for biliary stricture versus small sludge or stone   -fortunately, liver function tests are normal     2  Dyspepsia symptoms-may be secondary to H pylori gastritis versus peptic ulcer disease   -Patient was explained about the lifestyle and dietary modifications  Advised to avoid fatty foods, chocolates, caffeine, alcohol and any other triggering foods  Avoid eating for at least 3 hours before going to bed   -avoid NSAIDs       -consider starting over-the-counter Pepcid on daily basis  -will plan for EGD to assess for peptic ulcer disease and gastritis  3  Colon cancer screening-based on American Cancer society recommendations, would recommend colonoscopy at age 37 for approved by insurance  Patient was explained about  the risks and benefits of the procedure  Risks including but not limited to bleeding, infection, perforation were explained in detail  Also explained about less than 100% sensitivity with the exam and other alternatives  4  Chronic constipation-likely slow transit constipation in setting of inadequate fiber intake   -check magnesium levels, TSH was normal recently, calcium was normal   -discussed starting on Metamucil 1 tbsp daily  Increase water intake to 64 oz daily    ______________________________________________________________________    Reason for Consult / Principal Problem: [unfilled]    HPI: Federico Mcknight is a 53y o  year old female With history of type 2 diabetes, hyperlipidemia, depressive disorder, vitamin-D deficiency, presents for  Evaluation of chronic right-sided abdominal pain  She underwent right upper quadrant ultrasound which was notable for dilated CBD at 8 mm without any evidence of choledocholithiasis, no intrahepatic biliary dilatation  Liver appears normal, no portal vein thrombus  No suspicious liver lesions  Patient reports the pain is worsened with lifting and movement, no association with food intake or passage of bowel movements  She does report chronic constipation however this is unchanged  She also reports increased belching, and borborygmi  Denies any acid reflux, hematemesis, coffee-ground emesis or melena  She denies family history of colon cancer, gastric cancer or pancreatic cancer  Labs are reviewed and are notable for normal transaminases with AST of 11, ALT 9, bilirubin of 0 2, hemoglobin 11 5, platelets 019  TSH is 1 2  Review of Systems: The remainder of the review of systems was negative except for the pertinent positives noted in HPI       Historical Information   Past Medical History:   Diagnosis Date    Diabetes mellitus ([facility] [address] )      Past Surgical History:   Procedure Laterality Date    REDUCTION MAMMAPLASTY Bilateral     11/27/19    TUBAL LIGATION      last assessed 7/10/17     Social History   Social History     Substance and Sexual Activity   Alcohol Use No     Social History     Substance and Sexual Activity   Drug Use No     Social History     Tobacco Use   Smoking Status Never Smoker   Smokeless Tobacco Never Used     Family History   Problem Relation Age of Onset    Diabetes Mother     Hypertension Mother     Heart disease Father         myocardial infarction    Diabetes Sister     Hypertension Sister     Diabetes Maternal Grandmother     Hypertension Maternal Grandmother     No Known Problems Daughter     No Known Problems Maternal Grandfather     No Known Problems Paternal Grandmother     No Known Problems Paternal Grandfather     No Known Problems Sister     No Known Problems Sister     Leukemia Brother     No Known Problems Brother     No Known Problems Brother     No Known Problems Brother     No Known Problems Daughter     No Known Problems Son     No Known Problems Son     No Known Problems Maternal Aunt     No Known Problems Maternal Aunt     No Known Problems Maternal Aunt     No Known Problems Paternal Aunt     No Known Problems Paternal Aunt     No Known Problems Paternal Aunt     No Known Problems Paternal Uncle     No Known Problems Maternal Uncle     No Known Problems Maternal Uncle     No Known Problems Maternal Uncle     Breast cancer Cousin 28        Maternal    No Known Problems Brother     Leukemia Other     Substance Abuse Neg Hx     Mental illness Neg Hx        Meds/Allergies       (Not in a hospital admission)  No current facility-administered medications for this visit  Allergies   Allergen Reactions    Pollen Extract        Objective     Blood pressure 110/78, pulse 80, temperature 98 2 °F (36 8 °C), temperature source Tympanic, height 5' 2" (1 575 m), weight 61 7 kg (136 lb)  [unfilled]    PHYSICAL EXAM     GEN: well nourished, well developed, no acute distress  HEENT: anicteric, MMM  CV: RRR, no m/r/g  CHEST: CTA b/l, no WRR  ABD: +BS, soft, NT/ND, no hepatosplenomegaly  EXT: no c/c/e  SKIN: no rashes,  NEURO: aaox3    Lab Results:   No visits with results within 1 Day(s) from this visit     Latest known visit with results is:   Orders Only on 11/25/2019   Component Date Value    White Blood Cell Count 11/25/2019 8 1     Red Blood Cell Count 11/25/2019 4 32     Hemoglobin 11/25/2019 11 5     HCT 11/25/2019 34 7     MCV 11/25/2019 80     [address] 11/25/2019 26 6     MCHC 11/25/2019 33 1     RDW 11/25/2019 15 8*    Platelet Count 00/63/7929 429     Neutrophils 11/25/2019 58     Lymphocytes 11/25/2019 35     Monocytes 11/25/2019 6     Eosinophils 11/25/2019 1     Basophils PCT 11/25/2019 0  Neutrophils (Absolute) 11/25/2019 4 6     Lymphocytes (Absolute) 11/25/2019 2 9     Monocytes (Absolute) 11/25/2019 0 5     Eosinophils (Absolute) 11/25/2019 0 1     Basophils ABS 11/25/2019 0 0     Immature Granulocytes 11/25/2019 0     Immature Granulocytes (A* 11/25/2019 0 0     Glucose, Random 11/25/2019 124*    BUN 11/25/2019 10     Creatinine 11/25/2019 0 65     eGFR Non  11/25/2019 107     eGFR  11/25/2019 123     SL AMB BUN/CREATININE RA* 11/25/2019 15     Sodium 11/25/2019 139     Potassium 11/25/2019 5 9*    Chloride 11/25/2019 101     CO2 11/25/2019 23     CALCIUM 11/25/2019 10 0     Protein, Total 11/25/2019 7 1     Albumin 11/25/2019 4 2     Globulin, Total 11/25/2019 2 9     Albumin/Globulin Ratio 11/25/2019 1 4     TOTAL BILIRUBIN 11/25/2019 0 2     Alk Phos Isoenzymes 11/25/2019 51     AST 11/25/2019 11     ALT 11/25/2019 9     Cholesterol, Total 11/25/2019 177     Triglycerides 11/25/2019 87     HDL 11/25/2019 63     VLDL Cholesterol Calcula* 11/25/2019 17     LDL Direct 11/25/2019 97     Creatinine, Urine 11/25/2019 68 2     Microalbum  ,U,Random 11/25/2019 8 2     Microalb/Creat Ratio 11/25/2019 12 0     Hemoglobin A1C 11/25/2019 6 3*    TSH 11/25/2019 1 220      Imaging Studies: I have personally reviewed pertinent films in PACS

## 2020-03-03 DIAGNOSIS — E87.5 HYPERKALEMIA: Primary | ICD-10-CM

## 2020-03-03 LAB
ALBUMIN SERPL-MCNC: 4 G/DL (ref 3.8–4.8)
ALBUMIN/GLOB SERPL: 1.3 {RATIO} (ref 1.2–2.2)
ALP SERPL-CCNC: 53 IU/L (ref 39–117)
ALT SERPL-CCNC: 9 IU/L (ref 0–32)
AST SERPL-CCNC: 14 IU/L (ref 0–40)
BILIRUB SERPL-MCNC: <0.2 MG/DL (ref 0–1.2)
BUN SERPL-MCNC: 12 MG/DL (ref 6–24)
BUN/CREAT SERPL: 18 (ref 9–23)
CALCIUM SERPL-MCNC: 9.5 MG/DL (ref 8.7–10.2)
CHLORIDE SERPL-SCNC: 104 MMOL/L (ref 96–106)
CO2 SERPL-SCNC: 23 MMOL/L (ref 20–29)
CREAT SERPL-MCNC: 0.66 MG/DL (ref 0.57–1)
GLOBULIN SER-MCNC: 3 G/DL (ref 1.5–4.5)
GLUCOSE SERPL-MCNC: 132 MG/DL (ref 65–99)
MAGNESIUM SERPL-MCNC: 1.7 MG/DL (ref 1.6–2.3)
POTASSIUM SERPL-SCNC: 5.9 MMOL/L (ref 3.5–5.2)
PROT SERPL-MCNC: 7 G/DL (ref 6–8.5)
SL AMB EGFR AFRICAN AMERICAN: 123 ML/MIN/1.73
SL AMB EGFR NON AFRICAN AMERICAN: 106 ML/MIN/1.73
SODIUM SERPL-SCNC: 138 MMOL/L (ref 134–144)

## 2020-03-04 ENCOUNTER — TELEPHONE (OUTPATIENT)
Dept: FAMILY MEDICINE CLINIC | Facility: CLINIC | Age: 45
End: 2020-03-04

## 2020-03-04 NOTE — TELEPHONE ENCOUNTER
Patient picked up script and scheduled 03/19/20 at 3pm for OVL    She needed an appointment after 2pm      ------  Notes recorded by Sonali Lindsey MD on 3/3/2020 at 2:25 PM EST  I spoke with the patient she is asymptomatic, discussed about low-potassium diet and could be hemolysis of the blood, advised to repeat potassium level and drink more water and follow-up in the office so we can plan to do EKG, so please give her appointment when she comes for  for the lab  ------    Notes recorded by Latasha Morocho MD on 3/3/2020 at 12:31 PM EST  Please inform the patient that her potassium is persistently elevated, not sure if she states any potassium supplements   Please make sure that she addresses this with her PCP   I am cc'ing Dr Alfredo Lowe to the results as well   thanks

## 2020-03-05 ENCOUNTER — TELEPHONE (OUTPATIENT)
Dept: GASTROENTEROLOGY | Facility: CLINIC | Age: 45
End: 2020-03-05

## 2020-03-05 NOTE — TELEPHONE ENCOUNTER
----- Message from Elin Yancey MD sent at 3/3/2020 12:31 PM EST -----  Please inform the patient that her potassium is persistently elevated, not sure if she states any potassium supplements  Please make sure that she addresses this with her PCP  I am cc'ing Dr Melissa Cade to the results as well   thanks

## 2020-03-05 NOTE — LETTER
March 5, 2020     Mariajose Vish  [address] [place] [address]      Dear Ms Hendricksar:        Our office has attempted to call you in regards to your results, however, we have been unable to get a hold of you  Please give our office a call so we can review your results and answer any questions you may have in regards to your recent Lab Work                 Sincerely,        [facility]

## 2020-03-13 LAB — POTASSIUM SERPL-SCNC: 4.3 MMOL/L (ref 3.5–5.2)

## 2020-03-16 ENCOUNTER — TELEPHONE (OUTPATIENT)
Dept: PSYCHIATRY | Facility: CLINIC | Age: 45
End: 2020-03-16

## 2020-03-19 ENCOUNTER — OFFICE VISIT (OUTPATIENT)
Dept: FAMILY MEDICINE CLINIC | Facility: CLINIC | Age: 45
End: 2020-03-19
Payer: COMMERCIAL

## 2020-03-19 VITALS
WEIGHT: 139 LBS | HEART RATE: 78 BPM | HEIGHT: 62 IN | SYSTOLIC BLOOD PRESSURE: 112 MMHG | OXYGEN SATURATION: 99 % | DIASTOLIC BLOOD PRESSURE: 64 MMHG | RESPIRATION RATE: 16 BRPM | BODY MASS INDEX: 25.58 KG/M2

## 2020-03-19 DIAGNOSIS — E11.9 TYPE 2 DIABETES MELLITUS WITHOUT COMPLICATION, WITHOUT LONG-TERM CURRENT USE OF INSULIN (HCC): Primary | ICD-10-CM

## 2020-03-19 PROCEDURE — 1036F TOBACCO NON-USER: CPT | Performed by: FAMILY MEDICINE

## 2020-03-19 PROCEDURE — 99213 OFFICE O/P EST LOW 20 MIN: CPT | Performed by: FAMILY MEDICINE

## 2020-03-19 PROCEDURE — 3008F BODY MASS INDEX DOCD: CPT | Performed by: FAMILY MEDICINE

## 2020-03-19 NOTE — PROGRESS NOTES
Assessment/Plan:    Problem List Items Addressed This Visit        Endocrine    Type 2 diabetes mellitus without complication ([facility] [address] ) - Primary       She had hyperkalemia and after eating low potassium diet she cut down the knots in her diet and her potassium level is normal, she has no symptoms, she does not need EKG    Chief Complaint   Patient presents with    Follow-up       Subjective:   Patient ID: Letha Lemus is a 53 y o  female  She is here for follow-up on her high potassium, she says she was eating too many nuts and sunflower oil seeds, and since she has high potassium she stop eating them and she had repeat potassium test which came back normal she has no palpitation no heart racing denies any headache   She is diabetic and she is taking all her medications      Review of Systems   Constitutional: Negative for activity change, appetite change, chills, diaphoresis, fatigue, fever and unexpected weight change  HENT: Negative for congestion, dental problem, ear discharge, ear pain, facial swelling, hearing loss, mouth sores, nosebleeds, postnasal drip, rhinorrhea, sinus pressure, sinus pain, sneezing, sore throat, trouble swallowing and voice change  Eyes: Negative for photophobia, pain, discharge, redness and itching  Respiratory: Negative for cough, chest tightness, shortness of breath and wheezing  Cardiovascular: Negative for chest pain, palpitations and leg swelling  Gastrointestinal: Negative for abdominal distention, abdominal pain, blood in stool, constipation, diarrhea and nausea  Endocrine: Negative for cold intolerance, heat intolerance, polydipsia, polyphagia and polyuria  Genitourinary: Negative for dysuria, flank pain, frequency, hematuria and urgency  Musculoskeletal: Negative for arthralgias, back pain, myalgias and neck pain  Skin: Negative for color change and pallor  Allergic/Immunologic: Negative for environmental allergies and food allergies     Neurological: Negative for dizziness, weakness, light-headedness, numbness and headaches  Hematological: Negative for adenopathy  Does not bruise/bleed easily  Psychiatric/Behavioral: Negative for behavioral problems, sleep disturbance and suicidal ideas  The patient is not nervous/anxious  Objective:  Physical Exam   Constitutional: She appears well-developed and well-nourished  HENT:   Head: Normocephalic and atraumatic  Mouth/Throat: Oropharynx is clear and moist    She has bilateral slight wax ,  no infection   Eyes: Pupils are equal, round, and reactive to light  Conjunctivae and EOM are normal  No scleral icterus  Neck: Normal range of motion  Neck supple  No thyromegaly present  Cardiovascular: Normal rate, regular rhythm and normal heart sounds  Pulmonary/Chest: Effort normal and breath sounds normal  She has no wheezes  She has no rales  Lymphadenopathy:     She has no cervical adenopathy  Neurological: She is alert  Skin: No rash noted  No erythema  Psychiatric: She has a normal mood and affect  Her behavior is normal    Nursing note and vitals reviewed           Past Surgical History:   Procedure Laterality Date    REDUCTION MAMMAPLASTY Bilateral     11/27/19    TUBAL LIGATION      last assessed 7/10/17       Family History   Problem Relation Age of Onset    Diabetes Mother     Hypertension Mother     Heart disease Father         myocardial infarction    Diabetes Sister     Hypertension Sister     Diabetes Maternal Grandmother     Hypertension Maternal Grandmother     No Known Problems Daughter     No Known Problems Maternal Grandfather     No Known Problems Paternal Grandmother     No Known Problems Paternal Grandfather     No Known Problems Sister     No Known Problems Sister     Leukemia Brother     No Known Problems Brother     No Known Problems Brother     No Known Problems Brother     No Known Problems Daughter     No Known Problems Son     No Known Problems Son  No Known Problems Maternal Aunt     No Known Problems Maternal Aunt     No Known Problems Maternal Aunt     No Known Problems Paternal Aunt     No Known Problems Paternal Aunt     No Known Problems Paternal Aunt     No Known Problems Paternal Uncle     No Known Problems Maternal Uncle     No Known Problems Maternal Uncle     No Known Problems Maternal Uncle     Breast cancer Cousin 28        Maternal    No Known Problems Brother     Leukemia Other     Substance Abuse Neg Hx     Mental illness Neg Hx          Current Outpatient Medications:     docusate sodium (COLACE) 100 mg capsule, Take 1 tablet daily with the iron pills to avoid constipation, Disp: 30 capsule, Rfl: 6    Emollient (MEDERMA AG HAND & BODY) LOTN, Apply topically daily, Disp: 1 Bottle, Rfl: 1    ferrous gluconate (FERGON) 240 (27 FE) MG tablet, 1 tablet daily, Disp: 60 tablet, Rfl: 6    metFORMIN (GLUCOPHAGE) 500 mg tablet, TAKE 1 TABLET BY MOUTH TWICE A DAY WITH MEALS, Disp: 180 tablet, Rfl: 1    Na Sulfate-K Sulfate-Mg Sulf 17 5-3 13-1 6 GM/177ML SOLN, Take 1 applicator by mouth once for 1 dose, Disp: 1 Bottle, Rfl: 0    Allergies   Allergen Reactions    Pollen Extract        Vitals:    03/19/20 1454 03/19/20 1455   BP:  112/64   Pulse:  78   Resp: 16 16   SpO2:  99%   Weight:  63 kg (139 lb)   Height: 5' 2" (1 575 m)

## 2020-03-31 ENCOUNTER — TELEPHONE (OUTPATIENT)
Dept: GASTROENTEROLOGY | Facility: AMBULARY SURGERY CENTER | Age: 45
End: 2020-03-31

## 2020-03-31 NOTE — TELEPHONE ENCOUNTER
Pt called to reschedule her procedures (egd/colonoscopy) w/ dr leung at [facility] to 8/10/2020 due to [address]

## 2020-04-09 ENCOUNTER — TELEPHONE (OUTPATIENT)
Dept: FAMILY MEDICINE CLINIC | Facility: CLINIC | Age: 45
End: 2020-04-09

## 2020-04-09 DIAGNOSIS — E11.9 TYPE 2 DIABETES MELLITUS WITHOUT COMPLICATION, WITHOUT LONG-TERM CURRENT USE OF INSULIN (HCC): ICD-10-CM

## 2020-07-27 ENCOUNTER — ANESTHESIA (OUTPATIENT)
Dept: ANESTHESIOLOGY | Facility: HOSPITAL | Age: 45
End: 2020-07-27

## 2020-07-27 ENCOUNTER — ANESTHESIA EVENT (OUTPATIENT)
Dept: ANESTHESIOLOGY | Facility: HOSPITAL | Age: 45
End: 2020-07-27

## 2020-07-28 DIAGNOSIS — E11.9 TYPE 2 DIABETES MELLITUS WITHOUT COMPLICATION, WITHOUT LONG-TERM CURRENT USE OF INSULIN (HCC): ICD-10-CM

## 2020-08-09 RX ORDER — SODIUM CHLORIDE, SODIUM LACTATE, POTASSIUM CHLORIDE, CALCIUM CHLORIDE 600; 310; 30; 20 MG/100ML; MG/100ML; MG/100ML; MG/100ML
125 INJECTION, SOLUTION INTRAVENOUS CONTINUOUS
Status: CANCELLED | OUTPATIENT
Start: 2020-08-09

## 2020-08-10 ENCOUNTER — HOSPITAL ENCOUNTER (OUTPATIENT)
Dept: GASTROENTEROLOGY | Facility: AMBULARY SURGERY CENTER | Age: 45
Setting detail: OUTPATIENT SURGERY
Discharge: HOME | End: 2020-08-10
Attending: INTERNAL MEDICINE

## 2020-08-10 NOTE — H&P
History and Physical -  [facility]  Josselin Collado 50 y o  female MRN: [id number]    HPI: Josselin Collado is a 50y o  year old female who presents with dyspepsia and colon cancer screening         Review of Systems    Historical Information   Past Medical History:   Diagnosis Date    Diabetes mellitus ([facility] [address] )      Past Surgical History:   Procedure Laterality Date    REDUCTION MAMMAPLASTY Bilateral     11/27/19    TUBAL LIGATION      last assessed 7/10/17     Social History   Social History     Substance and Sexual Activity   Alcohol Use No     Social History     Substance and Sexual Activity   Drug Use No     Social History     Tobacco Use   Smoking Status Never Smoker   Smokeless Tobacco Never Used     Family History   Problem Relation Age of Onset    Diabetes Mother     Hypertension Mother     Heart disease Father         myocardial infarction    Diabetes Sister     Hypertension Sister     Diabetes Maternal Grandmother     Hypertension Maternal Grandmother     No Known Problems Daughter     No Known Problems Maternal Grandfather     No Known Problems Paternal Grandmother     No Known Problems Paternal Grandfather     No Known Problems Sister     No Known Problems Sister     Leukemia Brother     No Known Problems Brother     No Known Problems Brother     No Known Problems Brother     No Known Problems Daughter     No Known Problems Son     No Known Problems Son     No Known Problems Maternal Aunt     No Known Problems Maternal Aunt     No Known Problems Maternal Aunt     No Known Problems Paternal Aunt     No Known Problems Paternal Aunt     No Known Problems Paternal Aunt     No Known Problems Paternal Uncle     No Known Problems Maternal Uncle     No Known Problems Maternal Uncle     No Known Problems Maternal Uncle     Breast cancer Cousin 28        Maternal    No Known Problems Brother     Leukemia Other     Substance Abuse Neg Hx     Mental illness Neg Hx Meds/Allergies     (Not in a hospital admission)      Allergies   Allergen Reactions    Pollen Extract        Objective     There were no vitals taken for this visit  PHYSICAL EXAM    Gen: NAD  CV: RRR  CHEST: Clear  ABD: soft, NT/ND  EXT: no edema  Neuro: AAO      ASSESSMENT/PLAN:  This is a 50y o  year old female here for colon cancer screening and dyspepsia symptoms  PLAN:   Procedure: EGD and colonoscopy

## 2020-11-16 ENCOUNTER — OFFICE VISIT (OUTPATIENT)
Dept: FAMILY MEDICINE CLINIC | Facility: CLINIC | Age: 45
End: 2020-11-16
Payer: COMMERCIAL

## 2020-11-16 VITALS
BODY MASS INDEX: 25.03 KG/M2 | TEMPERATURE: 98.4 F | OXYGEN SATURATION: 99 % | DIASTOLIC BLOOD PRESSURE: 74 MMHG | HEIGHT: 62 IN | WEIGHT: 136 LBS | SYSTOLIC BLOOD PRESSURE: 124 MMHG | RESPIRATION RATE: 16 BRPM | HEART RATE: 87 BPM

## 2020-11-16 DIAGNOSIS — E11.9 TYPE 2 DIABETES MELLITUS WITHOUT COMPLICATION, WITHOUT LONG-TERM CURRENT USE OF INSULIN (HCC): Primary | ICD-10-CM

## 2020-11-16 DIAGNOSIS — Z12.31 ENCOUNTER FOR SCREENING MAMMOGRAM FOR BREAST CANCER: ICD-10-CM

## 2020-11-16 DIAGNOSIS — R68.81 EARLY SATIETY: ICD-10-CM

## 2020-11-16 DIAGNOSIS — D50.0 IRON DEFICIENCY ANEMIA DUE TO CHRONIC BLOOD LOSS: ICD-10-CM

## 2020-11-16 DIAGNOSIS — Z11.4 SCREENING FOR HIV (HUMAN IMMUNODEFICIENCY VIRUS): ICD-10-CM

## 2020-11-16 DIAGNOSIS — F32.A DEPRESSIVE DISORDER: ICD-10-CM

## 2020-11-16 DIAGNOSIS — E11.9 TYPE 2 DIABETES MELLITUS WITHOUT COMPLICATION, WITHOUT LONG-TERM CURRENT USE OF INSULIN (HCC): ICD-10-CM

## 2020-11-16 DIAGNOSIS — E04.1 THYROID NODULE: ICD-10-CM

## 2020-11-16 DIAGNOSIS — D64.9 ANEMIA, UNSPECIFIED TYPE: ICD-10-CM

## 2020-11-16 PROBLEM — L30.9 ECZEMA: Status: RESOLVED | Noted: 2018-08-30 | Resolved: 2020-11-16

## 2020-11-16 PROBLEM — E04.9 GOITER: Status: RESOLVED | Noted: 2018-07-24 | Resolved: 2020-11-16

## 2020-11-16 PROBLEM — M54.9 UPPER BACK PAIN ON RIGHT SIDE: Status: RESOLVED | Noted: 2020-01-20 | Resolved: 2020-11-16

## 2020-11-16 PROBLEM — R10.11 CHRONIC RUQ PAIN: Status: RESOLVED | Noted: 2020-01-20 | Resolved: 2020-11-16

## 2020-11-16 PROBLEM — G89.29 CHRONIC RUQ PAIN: Status: RESOLVED | Noted: 2020-01-20 | Resolved: 2020-11-16

## 2020-11-16 PROCEDURE — 3725F SCREEN DEPRESSION PERFORMED: CPT | Performed by: FAMILY MEDICINE

## 2020-11-16 PROCEDURE — 99214 OFFICE O/P EST MOD 30 MIN: CPT | Performed by: FAMILY MEDICINE

## 2020-11-16 PROCEDURE — 1036F TOBACCO NON-USER: CPT | Performed by: FAMILY MEDICINE

## 2020-11-16 PROCEDURE — 3008F BODY MASS INDEX DOCD: CPT | Performed by: FAMILY MEDICINE

## 2020-11-16 RX ORDER — PAROXETINE 10 MG/1
10 TABLET, FILM COATED ORAL DAILY
Qty: 30 TABLET | Refills: 0 | Status: SHIPPED | OUTPATIENT
Start: 2020-11-16 | End: 2020-11-29

## 2020-11-16 RX ORDER — DOCUSATE SODIUM 100 MG/1
CAPSULE, LIQUID FILLED ORAL
Qty: 30 CAPSULE | Refills: 6 | Status: SHIPPED | OUTPATIENT
Start: 2020-11-16 | End: 2022-03-31 | Stop reason: ALTCHOICE

## 2020-11-22 DIAGNOSIS — F32.A DEPRESSIVE DISORDER: ICD-10-CM

## 2020-11-29 RX ORDER — PAROXETINE 10 MG/1
TABLET, FILM COATED ORAL
Qty: 30 TABLET | Refills: 1 | Status: SHIPPED | OUTPATIENT
Start: 2020-11-29 | End: 2020-12-02 | Stop reason: SDUPTHER

## 2020-12-01 LAB
ALBUMIN SERPL-MCNC: 3.8 G/DL (ref 3.8–4.8)
ALBUMIN/CREAT UR: 23 MG/G CREAT (ref 0–29)
ALBUMIN/GLOB SERPL: 1.3 {RATIO} (ref 1.2–2.2)
ALP SERPL-CCNC: 62 IU/L (ref 39–117)
ALT SERPL-CCNC: 7 IU/L (ref 0–32)
AST SERPL-CCNC: 13 IU/L (ref 0–40)
BASOPHILS # BLD AUTO: 0 X10E3/UL (ref 0–0.2)
BASOPHILS NFR BLD AUTO: 0 %
BILIRUB SERPL-MCNC: <0.2 MG/DL (ref 0–1.2)
BUN SERPL-MCNC: 7 MG/DL (ref 6–24)
BUN/CREAT SERPL: 11 (ref 9–23)
CALCIUM SERPL-MCNC: 8.9 MG/DL (ref 8.7–10.2)
CHLORIDE SERPL-SCNC: 104 MMOL/L (ref 96–106)
CHOLEST SERPL-MCNC: 155 MG/DL (ref 100–199)
CO2 SERPL-SCNC: 24 MMOL/L (ref 20–29)
CREAT SERPL-MCNC: 0.65 MG/DL (ref 0.57–1)
CREAT UR-MCNC: 109.4 MG/DL
EOSINOPHIL # BLD AUTO: 0 X10E3/UL (ref 0–0.4)
EOSINOPHIL NFR BLD AUTO: 1 %
ERYTHROCYTE [DISTWIDTH] IN BLOOD BY AUTOMATED COUNT: 15 % (ref 11.7–15.4)
GLOBULIN SER-MCNC: 3 G/DL (ref 1.5–4.5)
GLUCOSE SERPL-MCNC: 116 MG/DL (ref 65–99)
HBA1C MFR BLD: 7 % (ref 4.8–5.6)
HCT VFR BLD AUTO: 34.4 % (ref 34–46.6)
HDLC SERPL-MCNC: 41 MG/DL
HGB BLD-MCNC: 11.1 G/DL (ref 11.1–15.9)
HIV 1+2 AB+HIV1 P24 AG SERPL QL IA: NON REACTIVE
IMM GRANULOCYTES # BLD: 0 X10E3/UL (ref 0–0.1)
IMM GRANULOCYTES NFR BLD: 0 %
LDLC SERPL CALC-MCNC: 92 MG/DL (ref 0–99)
LYMPHOCYTES # BLD AUTO: 1.7 X10E3/UL (ref 0.7–3.1)
LYMPHOCYTES NFR BLD AUTO: 32 %
MCH RBC QN AUTO: 25.5 PG (ref 26.6–33)
MCHC RBC AUTO-ENTMCNC: 32.3 G/DL (ref 31.5–35.7)
MCV RBC AUTO: 79 FL (ref 79–97)
MICROALBUMIN UR-MCNC: 25.5 UG/ML
MONOCYTES # BLD AUTO: 0.3 X10E3/UL (ref 0.1–0.9)
MONOCYTES NFR BLD AUTO: 6 %
NEUTROPHILS # BLD AUTO: 3.2 X10E3/UL (ref 1.4–7)
NEUTROPHILS NFR BLD AUTO: 61 %
PLATELET # BLD AUTO: 338 X10E3/UL (ref 150–450)
POTASSIUM SERPL-SCNC: 4.9 MMOL/L (ref 3.5–5.2)
PROT SERPL-MCNC: 6.8 G/DL (ref 6–8.5)
RBC # BLD AUTO: 4.35 X10E6/UL (ref 3.77–5.28)
SL AMB EGFR AFRICAN AMERICAN: 122 ML/MIN/1.73
SL AMB EGFR NON AFRICAN AMERICAN: 106 ML/MIN/1.73
SL AMB VLDL CHOLESTEROL CALC: 22 MG/DL (ref 5–40)
SODIUM SERPL-SCNC: 138 MMOL/L (ref 134–144)
TRIGL SERPL-MCNC: 119 MG/DL (ref 0–149)
TSH SERPL DL<=0.005 MIU/L-ACNC: 0.89 UIU/ML (ref 0.45–4.5)
WBC # BLD AUTO: 5.2 X10E3/UL (ref 3.4–10.8)

## 2020-12-01 PROCEDURE — 3061F NEG MICROALBUMINURIA REV: CPT | Performed by: FAMILY MEDICINE

## 2020-12-01 PROCEDURE — 3051F HG A1C>EQUAL 7.0%<8.0%: CPT | Performed by: FAMILY MEDICINE

## 2020-12-02 ENCOUNTER — TELEMEDICINE (OUTPATIENT)
Dept: FAMILY MEDICINE CLINIC | Facility: CLINIC | Age: 45
End: 2020-12-02
Payer: COMMERCIAL

## 2020-12-02 ENCOUNTER — TELEPHONE (OUTPATIENT)
Dept: GASTROENTEROLOGY | Facility: CLINIC | Age: 45
End: 2020-12-02

## 2020-12-02 DIAGNOSIS — E11.9 TYPE 2 DIABETES MELLITUS WITHOUT COMPLICATION, WITHOUT LONG-TERM CURRENT USE OF INSULIN (HCC): ICD-10-CM

## 2020-12-02 DIAGNOSIS — F32.A DEPRESSIVE DISORDER: ICD-10-CM

## 2020-12-02 DIAGNOSIS — U07.1 COVID-19 VIRUS INFECTION: Primary | ICD-10-CM

## 2020-12-02 PROCEDURE — 99214 OFFICE O/P EST MOD 30 MIN: CPT | Performed by: FAMILY MEDICINE

## 2020-12-02 PROCEDURE — 1036F TOBACCO NON-USER: CPT | Performed by: FAMILY MEDICINE

## 2020-12-02 RX ORDER — PAROXETINE 10 MG/1
10 TABLET, FILM COATED ORAL DAILY
Qty: 90 TABLET | Refills: 1 | Status: SHIPPED | OUTPATIENT
Start: 2020-12-02 | End: 2021-03-02 | Stop reason: SDUPTHER

## 2020-12-22 ENCOUNTER — TELEMEDICINE (OUTPATIENT)
Dept: FAMILY MEDICINE CLINIC | Facility: CLINIC | Age: 45
End: 2020-12-22
Payer: COMMERCIAL

## 2020-12-22 DIAGNOSIS — U07.1 COVID-19 VIRUS INFECTION: ICD-10-CM

## 2020-12-22 DIAGNOSIS — J01.10 SUBACUTE FRONTAL SINUSITIS: Primary | ICD-10-CM

## 2020-12-22 PROCEDURE — 1036F TOBACCO NON-USER: CPT | Performed by: FAMILY MEDICINE

## 2020-12-22 PROCEDURE — 99213 OFFICE O/P EST LOW 20 MIN: CPT | Performed by: FAMILY MEDICINE

## 2020-12-22 RX ORDER — AMOXICILLIN AND CLAVULANATE POTASSIUM 875; 125 MG/1; MG/1
1 TABLET, FILM COATED ORAL EVERY 12 HOURS SCHEDULED
Qty: 20 TABLET | Refills: 0 | Status: SHIPPED | OUTPATIENT
Start: 2020-12-22 | End: 2021-01-01

## 2021-02-03 RX ORDER — DIPHENOXYLATE HYDROCHLORIDE AND ATROPINE SULFATE 2.5; .025 MG/1; MG/1
1 TABLET ORAL DAILY
COMMUNITY

## 2021-02-03 NOTE — PRE-PROCEDURE INSTRUCTIONS
Pre-Surgery Instructions:   Medication Instructions    docusate sodium (COLACE) 100 mg capsule Instructed patient per Anesthesia Guidelines   ferrous gluconate (FERGON) 240 (27 FE) MG tablet Patient was instructed by Physician and understands   metFORMIN (GLUCOPHAGE) 500 mg tablet Instructed patient per Anesthesia Guidelines   multivitamin [facility]) TABS Patient was instructed by Physician and understands   Na Sulfate-K Sulfate-Mg Sulf 17 5-3 13-1 6 GM/177ML SOLN Patient was instructed by Physician and understands   PARoxetine (PAXIL) 10 mg tablet Instructed patient per Anesthesia Guidelines   sitaGLIPtin (JANUVIA) 25 mg tablet Instructed patient per Anesthesia Guidelines  Pt to follow Dr Emy Garcia instructions   will be her son or   Covid test does not need to be done  She had a positive covid test on 11/28/20 and is currently asymptomatic

## 2021-02-04 ENCOUNTER — ANESTHESIA EVENT (OUTPATIENT)
Dept: GASTROENTEROLOGY | Facility: AMBULARY SURGERY CENTER | Age: 46
End: 2021-02-04

## 2021-02-04 PROBLEM — Z98.51 S/P TUBAL LIGATION: Status: ACTIVE | Noted: 2021-02-04

## 2021-02-05 ENCOUNTER — ANESTHESIA (OUTPATIENT)
Dept: GASTROENTEROLOGY | Facility: AMBULARY SURGERY CENTER | Age: 46
End: 2021-02-05

## 2021-02-05 ENCOUNTER — HOSPITAL ENCOUNTER (OUTPATIENT)
Dept: GASTROENTEROLOGY | Facility: AMBULARY SURGERY CENTER | Age: 46
Setting detail: OUTPATIENT SURGERY
Discharge: HOME | End: 2021-02-05
Attending: INTERNAL MEDICINE | Admitting: INTERNAL MEDICINE
Payer: COMMERCIAL

## 2021-02-05 VITALS
SYSTOLIC BLOOD PRESSURE: 110 MMHG | RESPIRATION RATE: 16 BRPM | BODY MASS INDEX: 25.21 KG/M2 | OXYGEN SATURATION: 99 % | TEMPERATURE: 96.7 F | HEART RATE: 74 BPM | HEIGHT: 62 IN | DIASTOLIC BLOOD PRESSURE: 71 MMHG | WEIGHT: 137 LBS

## 2021-02-05 VITALS — HEART RATE: 81 BPM

## 2021-02-05 DIAGNOSIS — K83.8 DILATED CBD, ACQUIRED: Primary | ICD-10-CM

## 2021-02-05 DIAGNOSIS — R10.9 ABDOMINAL PAIN, UNSPECIFIED ABDOMINAL LOCATION: ICD-10-CM

## 2021-02-05 LAB — GLUCOSE SERPL-MCNC: 145 MG/DL (ref 65–140)

## 2021-02-05 PROCEDURE — 88305 TISSUE EXAM BY PATHOLOGIST: CPT | Performed by: PATHOLOGY

## 2021-02-05 PROCEDURE — 45378 DIAGNOSTIC COLONOSCOPY: CPT | Performed by: INTERNAL MEDICINE

## 2021-02-05 PROCEDURE — 82948 REAGENT STRIP/BLOOD GLUCOSE: CPT

## 2021-02-05 PROCEDURE — 43239 EGD BIOPSY SINGLE/MULTIPLE: CPT | Performed by: INTERNAL MEDICINE

## 2021-02-05 RX ORDER — PROPOFOL 10 MG/ML
INJECTION, EMULSION INTRAVENOUS CONTINUOUS PRN
Status: DISCONTINUED | OUTPATIENT
Start: 2021-02-05 | End: 2021-02-05

## 2021-02-05 RX ORDER — PROPOFOL 10 MG/ML
INJECTION, EMULSION INTRAVENOUS AS NEEDED
Status: DISCONTINUED | OUTPATIENT
Start: 2021-02-05 | End: 2021-02-05

## 2021-02-05 RX ORDER — LIDOCAINE HYDROCHLORIDE 20 MG/ML
INJECTION, SOLUTION INFILTRATION; PERINEURAL AS NEEDED
Status: DISCONTINUED | OUTPATIENT
Start: 2021-02-05 | End: 2021-02-05

## 2021-02-05 RX ORDER — SODIUM CHLORIDE, SODIUM LACTATE, POTASSIUM CHLORIDE, CALCIUM CHLORIDE 600; 310; 30; 20 MG/100ML; MG/100ML; MG/100ML; MG/100ML
75 INJECTION, SOLUTION INTRAVENOUS CONTINUOUS
Status: DISCONTINUED | OUTPATIENT
Start: 2021-02-05 | End: 2021-02-09 | Stop reason: HOSPADM

## 2021-02-05 RX ORDER — GLYCOPYRROLATE 0.2 MG/ML
INJECTION INTRAMUSCULAR; INTRAVENOUS AS NEEDED
Status: DISCONTINUED | OUTPATIENT
Start: 2021-02-05 | End: 2021-02-05

## 2021-02-05 RX ADMIN — SODIUM CHLORIDE, SODIUM LACTATE, POTASSIUM CHLORIDE, AND CALCIUM CHLORIDE: .6; .31; .03; .02 INJECTION, SOLUTION INTRAVENOUS at 08:58

## 2021-02-05 RX ADMIN — PROPOFOL 120 MCG/KG/MIN: 10 INJECTION, EMULSION INTRAVENOUS at 09:08

## 2021-02-05 RX ADMIN — PROPOFOL 50 MG: 10 INJECTION, EMULSION INTRAVENOUS at 09:08

## 2021-02-05 RX ADMIN — PROPOFOL 100 MG: 10 INJECTION, EMULSION INTRAVENOUS at 09:00

## 2021-02-05 RX ADMIN — GLYCOPYRROLATE 0.2 MG: 0.2 INJECTION, SOLUTION INTRAMUSCULAR; INTRAVENOUS at 09:00

## 2021-02-05 RX ADMIN — LIDOCAINE HYDROCHLORIDE 3 ML: 20 INJECTION, SOLUTION INFILTRATION; PERINEURAL at 09:00

## 2021-02-05 RX ADMIN — PROPOFOL 50 MG: 10 INJECTION, EMULSION INTRAVENOUS at 09:04

## 2021-02-05 RX ADMIN — PROPOFOL 20 MG: 10 INJECTION, EMULSION INTRAVENOUS at 09:18

## 2021-02-05 NOTE — ANESTHESIA PREPROCEDURE EVALUATION
Procedure:  COLONOSCOPY  EGD    Relevant Problems   ENDO   (+) Type 2 diabetes mellitus without complication (HCC)      HEMATOLOGY   (+) Anemia      NEURO/PSYCH   (+) Depressive disorder      Other   (+) S/P tubal ligation        Physical Exam    Airway    Mallampati score: II  TM Distance: >3 FB  Neck ROM: full     Dental       Cardiovascular  Rhythm: regular, Rate: normal,     Pulmonary  Breath sounds clear to auscultation,     Other Findings        Anesthesia Plan  ASA Score- 2     Anesthesia Type- IV sedation with anesthesia with ASA Monitors  Additional Monitors:   Airway Plan:           Plan Factors-    Chart reviewed  Patient is not a current smoker  Induction- intravenous  Postoperative Plan-     Informed Consent- Anesthetic plan and risks discussed with patient  I personally reviewed this patient with the CRNA  Discussed and agreed on the Anesthesia Plan with the CRNA  Loli Odom

## 2021-02-08 ENCOUNTER — TRANSCRIBE ORDERS (OUTPATIENT)
Dept: GASTROENTEROLOGY | Facility: CLINIC | Age: 46
End: 2021-02-08

## 2021-02-09 DIAGNOSIS — K29.70 HELICOBACTER PYLORI GASTRITIS: ICD-10-CM

## 2021-02-09 DIAGNOSIS — B96.81 HELICOBACTER PYLORI GASTRITIS: ICD-10-CM

## 2021-02-09 DIAGNOSIS — B96.81 HELICOBACTER PYLORI GASTRITIS: Primary | ICD-10-CM

## 2021-02-09 DIAGNOSIS — K30 INDIGESTION: Primary | ICD-10-CM

## 2021-02-09 DIAGNOSIS — K29.70 HELICOBACTER PYLORI GASTRITIS: Primary | ICD-10-CM

## 2021-02-09 DIAGNOSIS — K29.70 GASTRITIS WITHOUT BLEEDING, UNSPECIFIED CHRONICITY, UNSPECIFIED GASTRITIS TYPE: ICD-10-CM

## 2021-02-09 RX ORDER — LANSOPRAZOLE, AMOXICILLIN, CLARITHROMYCIN 30-500-500
KIT ORAL 2 TIMES DAILY
Qty: 1 KIT | Refills: 0 | Status: SHIPPED | OUTPATIENT
Start: 2021-02-09 | End: 2021-05-26 | Stop reason: ALTCHOICE

## 2021-02-09 NOTE — TELEPHONE ENCOUNTER
Patient aware of results/recommendation  Henny Stevens said she does not have omeprazole 20 mg to take daily after completing prevpac   Will send script to pharmacy  Shanice Diaz also put in order for h pylori stool antigen due at least 30 days after completing prevpac   She will hold PPI 1-2 weeks prior to stool test  Henny tSevens will also call to schedule MRI/MRCP and then schedule office visit 2-3 months after      She is reporting constipation, suggested miralax OTC daily until bowel movement, increase fiber and fluid intake

## 2021-02-09 NOTE — TELEPHONE ENCOUNTER
----- Message from Yolette Larios sent at 2/9/2021  1:26 PM EST -----    ----- Message -----  From: Pat Garcias MD  Sent: 2/9/2021   1:16 PM EST  To: [facility]astroenter[facility]    Please inform the patient that the gastric biopsies show chronic severe gastritis with H pylori organism  She also has changes in the lining of the cells in the stomach (  Intestinal metaplasia)  I would recommend surveillance for this with repeat EGD in 1-2 years  Meanwhile she will need to be treated for H pylori organism  Additionally, she does have changes of GERD on distal esophageal biopsies with inflammation but no evidence of Louis's esophagus  She should take omeprazole 20 mg on daily basis even after treatment for H pylori due to the inflammation (   Intestinal metaplasia)  additionally, she needs to have MRI/MRCP  This was ordered by me for the 2nd time on the day of her procedure  Repeat colonoscopy in 10 years  Follow-up in the office after the MRI  In 2-3 months to discuss the results

## 2021-02-09 NOTE — TELEPHONE ENCOUNTER
----- Message from Yolette Magallanes sent at 2/9/2021  1:26 PM EST -----    ----- Message -----  From: Eugenio Bernal MD  Sent: 2/9/2021   1:16 PM EST  To: [facility]    Please inform the patient that the gastric biopsies show chronic severe gastritis with H pylori organism  She also has changes in the lining of the cells in the stomach (  Intestinal metaplasia)  I would recommend surveillance for this with repeat EGD in 1-2 years  Meanwhile she will need to be treated for H pylori organism  Additionally, she does have changes of GERD on distal esophageal biopsies with inflammation but no evidence of Louis's esophagus  She should take omeprazole 20 mg on daily basis even after treatment for H pylori due to the inflammation (   Intestinal metaplasia)  additionally, she needs to have MRI/MRCP  This was ordered by me for the 2nd time on the day of her procedure  Repeat colonoscopy in 10 years  Follow-up in the office after the MRI  In 2-3 months to discuss the results

## 2021-02-10 RX ORDER — OMEPRAZOLE 20 MG/1
20 CAPSULE, DELAYED RELEASE ORAL DAILY
Qty: 30 CAPSULE | Refills: 11 | Status: SHIPPED | OUTPATIENT
Start: 2021-02-10 | End: 2021-05-26 | Stop reason: ALTCHOICE

## 2021-03-02 DIAGNOSIS — E11.9 TYPE 2 DIABETES MELLITUS WITHOUT COMPLICATION, WITHOUT LONG-TERM CURRENT USE OF INSULIN (HCC): ICD-10-CM

## 2021-03-02 DIAGNOSIS — F32.A DEPRESSIVE DISORDER: ICD-10-CM

## 2021-03-02 RX ORDER — PAROXETINE 10 MG/1
10 TABLET, FILM COATED ORAL DAILY
Qty: 90 TABLET | Refills: 1 | Status: SHIPPED | OUTPATIENT
Start: 2021-03-02 | End: 2021-10-25 | Stop reason: ALTCHOICE

## 2021-03-02 NOTE — TELEPHONE ENCOUNTER
[facility]     Metformin she needs a # [facility] day supply to local [facility]

## 2021-03-16 DIAGNOSIS — E11.9 TYPE 2 DIABETES MELLITUS WITHOUT COMPLICATION, WITHOUT LONG-TERM CURRENT USE OF INSULIN (HCC): ICD-10-CM

## 2021-04-05 ENCOUNTER — HOSPITAL ENCOUNTER (OUTPATIENT)
Dept: MAMMOGRAPHY | Facility: HOSPITAL | Age: 46
Discharge: HOME | End: 2021-04-05
Attending: FAMILY MEDICINE
Payer: COMMERCIAL

## 2021-04-05 VITALS — WEIGHT: 137 LBS | HEIGHT: 62 IN | BODY MASS INDEX: 25.21 KG/M2

## 2021-04-05 DIAGNOSIS — Z12.31 ENCOUNTER FOR SCREENING MAMMOGRAM FOR BREAST CANCER: ICD-10-CM

## 2021-04-05 PROCEDURE — 77067 SCR MAMMO BI INCL CAD: CPT

## 2021-04-05 PROCEDURE — 77063 BREAST TOMOSYNTHESIS BI: CPT

## 2021-04-06 ENCOUNTER — TELEPHONE (OUTPATIENT)
Dept: FAMILY MEDICINE CLINIC | Facility: CLINIC | Age: 46
End: 2021-04-06

## 2021-04-06 NOTE — TELEPHONE ENCOUNTER
----- Message from Laron La MD sent at 4/6/2021  8:36 AM EDT -----  Normal mammogram, please inform the patient

## 2021-04-27 LAB
ALBUMIN SERPL-MCNC: 4.2 G/DL (ref 3.8–4.8)
ALBUMIN/GLOB SERPL: 1.3 {RATIO} (ref 1.2–2.2)
ALP SERPL-CCNC: 49 IU/L (ref 39–117)
ALT SERPL-CCNC: 6 IU/L (ref 0–32)
AST SERPL-CCNC: 10 IU/L (ref 0–40)
BASOPHILS # BLD AUTO: 0.1 X10E3/UL (ref 0–0.2)
BASOPHILS NFR BLD AUTO: 1 %
BILIRUB SERPL-MCNC: <0.2 MG/DL (ref 0–1.2)
BUN SERPL-MCNC: 10 MG/DL (ref 6–24)
BUN/CREAT SERPL: 15 (ref 9–23)
CALCIUM SERPL-MCNC: 9.2 MG/DL (ref 8.7–10.2)
CHLORIDE SERPL-SCNC: 102 MMOL/L (ref 96–106)
CO2 SERPL-SCNC: 23 MMOL/L (ref 20–29)
CREAT SERPL-MCNC: 0.68 MG/DL (ref 0.57–1)
EOSINOPHIL # BLD AUTO: 0.1 X10E3/UL (ref 0–0.4)
EOSINOPHIL NFR BLD AUTO: 1 %
ERYTHROCYTE [DISTWIDTH] IN BLOOD BY AUTOMATED COUNT: 13.9 % (ref 11.7–15.4)
GLOBULIN SER-MCNC: 3.2 G/DL (ref 1.5–4.5)
GLUCOSE SERPL-MCNC: 126 MG/DL (ref 65–99)
HBA1C MFR BLD: 6.4 % (ref 4.8–5.6)
HCT VFR BLD AUTO: 34.5 % (ref 34–46.6)
HGB BLD-MCNC: 10.7 G/DL (ref 11.1–15.9)
IMM GRANULOCYTES # BLD: 0 X10E3/UL (ref 0–0.1)
IMM GRANULOCYTES NFR BLD: 0 %
LYMPHOCYTES # BLD AUTO: 2.5 X10E3/UL (ref 0.7–3.1)
LYMPHOCYTES NFR BLD AUTO: 35 %
MCH RBC QN AUTO: 24.4 PG (ref 26.6–33)
MCHC RBC AUTO-ENTMCNC: 31 G/DL (ref 31.5–35.7)
MCV RBC AUTO: 79 FL (ref 79–97)
MONOCYTES # BLD AUTO: 0.5 X10E3/UL (ref 0.1–0.9)
MONOCYTES NFR BLD AUTO: 7 %
NEUTROPHILS # BLD AUTO: 4 X10E3/UL (ref 1.4–7)
NEUTROPHILS NFR BLD AUTO: 56 %
PLATELET # BLD AUTO: 399 X10E3/UL (ref 150–450)
POTASSIUM SERPL-SCNC: 5.3 MMOL/L (ref 3.5–5.2)
PROT SERPL-MCNC: 7.4 G/DL (ref 6–8.5)
RBC # BLD AUTO: 4.39 X10E6/UL (ref 3.77–5.28)
SL AMB EGFR AFRICAN AMERICAN: 120 ML/MIN/1.73
SL AMB EGFR NON AFRICAN AMERICAN: 105 ML/MIN/1.73
SODIUM SERPL-SCNC: 138 MMOL/L (ref 134–144)
WBC # BLD AUTO: 7.1 X10E3/UL (ref 3.4–10.8)

## 2021-04-28 ENCOUNTER — TELEPHONE (OUTPATIENT)
Dept: FAMILY MEDICINE CLINIC | Facility: CLINIC | Age: 46
End: 2021-04-28

## 2021-04-28 NOTE — TELEPHONE ENCOUNTER
----- Message from Margot Palomino MD sent at 4/27/2021  6:50 PM EDT -----  Patient needs follow-up appointment, has  Abnormal blood work

## 2021-05-04 ENCOUNTER — OFFICE VISIT (OUTPATIENT)
Dept: FAMILY MEDICINE CLINIC | Facility: CLINIC | Age: 46
End: 2021-05-04
Payer: COMMERCIAL

## 2021-05-04 VITALS
OXYGEN SATURATION: 98 % | RESPIRATION RATE: 16 BRPM | BODY MASS INDEX: 24.66 KG/M2 | DIASTOLIC BLOOD PRESSURE: 62 MMHG | HEIGHT: 62 IN | HEART RATE: 85 BPM | SYSTOLIC BLOOD PRESSURE: 102 MMHG | WEIGHT: 134 LBS

## 2021-05-04 DIAGNOSIS — E87.5 HYPERKALEMIA: ICD-10-CM

## 2021-05-04 DIAGNOSIS — E11.9 TYPE 2 DIABETES MELLITUS WITHOUT COMPLICATION, WITHOUT LONG-TERM CURRENT USE OF INSULIN (HCC): Primary | ICD-10-CM

## 2021-05-04 DIAGNOSIS — F32.A DEPRESSIVE DISORDER: ICD-10-CM

## 2021-05-04 DIAGNOSIS — H93.13 TINNITUS OF BOTH EARS: ICD-10-CM

## 2021-05-04 DIAGNOSIS — D50.9 IRON DEFICIENCY ANEMIA, UNSPECIFIED IRON DEFICIENCY ANEMIA TYPE: ICD-10-CM

## 2021-05-04 PROBLEM — J01.10 SUBACUTE FRONTAL SINUSITIS: Status: RESOLVED | Noted: 2020-12-22 | Resolved: 2021-05-04

## 2021-05-04 PROCEDURE — 99214 OFFICE O/P EST MOD 30 MIN: CPT | Performed by: FAMILY MEDICINE

## 2021-05-04 NOTE — ASSESSMENT & PLAN NOTE
Lab Results   Component Value Date    HGBA1C 6 4 (H) 04/26/2021   Diabetes improved, she will continue taking Januvia and metformin

## 2021-05-04 NOTE — ASSESSMENT & PLAN NOTE
She feels much better but she takes medicine intermittently as she missed many days advised to take more regular

## 2021-05-04 NOTE — PROGRESS NOTES
Assessment/Plan:    Problem List Items Addressed This Visit        Endocrine    Type 2 diabetes mellitus without complication ([facility] [address] ) - Primary       Lab Results   Component Value Date    HGBA1C 6 4 (H) 04/26/2021   Diabetes improved, she will continue taking Januvia and metformin         Relevant Orders    CBC and differential    Comprehensive metabolic panel    Hemoglobin A1C    Lipid panel    Microalbumin / creatinine urine ratio    TSH, 3rd generation       Other    Depressive disorder     She feels much better but she takes medicine intermittently as she missed many days advised to take more regular         Anemia    Relevant Orders    CBC and differential    Ferritin    Tinnitus of both ears    Relevant Orders    Ambulatory Referral to Otolaryngology    Hyperkalemia    Relevant Orders    Comprehensive metabolic panel      Depression Screening Follow-up Plan: Patient's depression screening was positive with a PHQ-2 score of   Their PHQ-9 score was   Patient assessed for underlying major depression  They have no active suicidal ideations  Brief counseling provided and recommend additional follow-up/re-evaluation next office visit  Patient advised to follow-up with PCP for further management  Chief Complaint   Patient presents with    Follow-up       Subjective:   Patient ID: Юлия Peña is a 50 y o  female  She complains of tinnitus in both ears for long time and she wants to see ENT,  She is diabetic and on last visit Januvia was added, she takes medicine regularly  And she also take metformin twice a day,  Her diet is high in potassium in the past she had high potassium then she changed her diet and potassium went down now and she is eating her potassium diet,  He does not take any blood pressure medication she has history of anemia and she is advised to take iron but she takes her regularly and she gets constipation as her oral intake is less       Review of Systems   Constitutional: Negative for activity change, appetite change, chills, fatigue, fever and unexpected weight change  HENT: Negative for congestion, ear discharge, ear pain, nosebleeds, postnasal drip, rhinorrhea, sinus pressure, sneezing, sore throat, trouble swallowing and voice change  Eyes: Negative for photophobia, pain, discharge, redness and itching  Respiratory: Negative for cough, chest tightness, shortness of breath and wheezing  Cardiovascular: Negative for chest pain, palpitations and leg swelling  Gastrointestinal: Negative for abdominal pain, constipation, diarrhea, nausea and vomiting  Endocrine: Negative for polyuria  Genitourinary: Negative for dysuria, frequency and urgency  Musculoskeletal: Negative for arthralgias, back pain, myalgias and neck pain  Skin: Negative for color change, pallor and rash  Allergic/Immunologic: Negative for environmental allergies and food allergies  Neurological: Negative for dizziness, weakness, light-headedness and headaches  Hematological: Negative for adenopathy  Does not bruise/bleed easily  Psychiatric/Behavioral: Negative for behavioral problems  The patient is not nervous/anxious  Objective:  Physical Exam  Vitals signs and nursing note reviewed  Constitutional:       Appearance: She is well-developed  HENT:      Head: Normocephalic and atraumatic  Eyes:      General: No scleral icterus  Conjunctiva/sclera: Conjunctivae normal       Pupils: Pupils are equal, round, and reactive to light  Neck:      Musculoskeletal: Normal range of motion and neck supple  Thyroid: No thyromegaly  Cardiovascular:      Rate and Rhythm: Normal rate and regular rhythm  Heart sounds: Normal heart sounds  No murmur  Pulmonary:      Effort: Pulmonary effort is normal       Breath sounds: Normal breath sounds  No wheezing or rales  Abdominal:      General: There is no distension  Palpations: There is no mass  Tenderness: There is no abdominal tenderness  Musculoskeletal:      Right lower leg: No edema  Left lower leg: No edema  Lymphadenopathy:      Cervical: No cervical adenopathy  Skin:     Findings: No erythema or rash  Neurological:      General: No focal deficit present  Mental Status: She is alert     Psychiatric:         Mood and Affect: Mood normal             Past Surgical History:   Procedure Laterality Date    REDUCTION MAMMAPLASTY Bilateral     11/27/19    TUBAL LIGATION      last assessed 7/10/17       Family History   Problem Relation Age of Onset    Diabetes Mother     Hypertension Mother     Heart disease Father         myocardial infarction    Diabetes Sister     Diabetes Maternal Grandmother     Hypertension Maternal Grandmother     No Known Problems Daughter     No Known Problems Maternal Grandfather     No Known Problems Paternal Grandmother     No Known Problems Paternal Grandfather     No Known Problems Sister     No Known Problems Sister     Leukemia Brother     No Known Problems Brother     No Known Problems Brother     No Known Problems Brother     No Known Problems Daughter     No Known Problems Son     No Known Problems Son     No Known Problems Maternal Aunt     No Known Problems Maternal Aunt     No Known Problems Maternal Aunt     No Known Problems Paternal Aunt     No Known Problems Paternal Aunt     No Known Problems Paternal Aunt     No Known Problems Paternal Uncle     No Known Problems Maternal Uncle     No Known Problems Maternal Uncle     No Known Problems Maternal Uncle     Breast cancer Cousin 28        Maternal    No Known Problems Brother     Leukemia Other     Substance Abuse Neg Hx     Mental illness Neg Hx          Current Outpatient Medications:     docusate sodium (COLACE) 100 mg capsule, Take 1 tablet daily with the iron pills to avoid constipation (Patient taking differently: 2 (two) times a day as needed Take 1 tablet daily with the iron pills to avoid constipation), Disp: 30 capsule, Rfl: 6    ferrous gluconate (FERGON) 240 (27 FE) MG tablet, 1 tablet daily (Patient taking differently: daily Last dose 1/28/21), Disp: 60 tablet, Rfl: 6    metFORMIN (GLUCOPHAGE) 500 mg tablet, Take 1 tablet (500 mg total) by mouth 2 (two) times a day with meals, Disp: 180 tablet, Rfl: 1    PARoxetine (PAXIL) 10 mg tablet, Take 1 tablet (10 mg total) by mouth daily, Disp: 90 tablet, Rfl: 1    sitaGLIPtin (JANUVIA) 25 mg tablet, Take 1 tablet (25 mg total) by mouth daily, Disp: 90 tablet, Rfl: 1    amoxicillin-clarithromycin-lansoprazole (PREVPAC), Take by mouth 2 (two) times a day Follow package directions  , Disp: 1 kit, Rfl: 0    multivitamin (THERAGRAN) TABS, Take 1 tablet by mouth daily, Disp: , Rfl:     Na Sulfate-K Sulfate-Mg Sulf 17 5-3 13-1 6 GM/177ML SOLN, Take 1 applicator by mouth once for 1 dose, Disp: 1 Bottle, Rfl: 0    omeprazole (PriLOSEC) 20 mg delayed release capsule, Take 1 capsule (20 mg total) by mouth daily, Disp: 30 capsule, Rfl: 11    Allergies   Allergen Reactions    Pollen Extract        Vitals:    05/04/21 1510   BP: 102/62   Pulse: 85   Resp: 16   SpO2: 98%   Weight: 60 8 kg (134 lb)   Height: 5' 2" (1 575 m)

## 2021-05-24 ENCOUNTER — HOSPITAL ENCOUNTER (OUTPATIENT)
Dept: MRI IMAGING | Facility: HOSPITAL | Age: 46
Discharge: HOME | End: 2021-05-24
Attending: INTERNAL MEDICINE
Payer: COMMERCIAL

## 2021-05-24 DIAGNOSIS — K83.8 DILATED CBD, ACQUIRED: ICD-10-CM

## 2021-05-24 PROCEDURE — A9585 GADOBUTROL INJECTION: HCPCS | Performed by: INTERNAL MEDICINE

## 2021-05-24 PROCEDURE — 74183 MRI ABD W/O CNTR FLWD CNTR: CPT

## 2021-05-24 PROCEDURE — G1004 CDSM NDSC: HCPCS

## 2021-05-24 RX ADMIN — GADOBUTROL 6 ML: 604.72 INJECTION INTRAVENOUS at 19:40

## 2021-05-26 ENCOUNTER — OFFICE VISIT (OUTPATIENT)
Dept: GASTROENTEROLOGY | Facility: CLINIC | Age: 46
End: 2021-05-26
Payer: COMMERCIAL

## 2021-05-26 VITALS
HEIGHT: 62 IN | HEART RATE: 65 BPM | WEIGHT: 135.6 LBS | TEMPERATURE: 96.9 F | SYSTOLIC BLOOD PRESSURE: 88 MMHG | DIASTOLIC BLOOD PRESSURE: 61 MMHG | BODY MASS INDEX: 24.95 KG/M2

## 2021-05-26 DIAGNOSIS — R10.13 DYSPEPSIA: ICD-10-CM

## 2021-05-26 DIAGNOSIS — K29.70 HELICOBACTER PYLORI GASTRITIS: Primary | ICD-10-CM

## 2021-05-26 DIAGNOSIS — B96.81 HELICOBACTER PYLORI GASTRITIS: Primary | ICD-10-CM

## 2021-05-26 DIAGNOSIS — K59.00 CONSTIPATION, UNSPECIFIED CONSTIPATION TYPE: ICD-10-CM

## 2021-05-26 PROCEDURE — 99214 OFFICE O/P EST MOD 30 MIN: CPT | Performed by: PHYSICIAN ASSISTANT

## 2021-05-26 PROCEDURE — 1036F TOBACCO NON-USER: CPT | Performed by: PHYSICIAN ASSISTANT

## 2021-05-26 PROCEDURE — 3008F BODY MASS INDEX DOCD: CPT | Performed by: PHYSICIAN ASSISTANT

## 2021-05-26 RX ORDER — PANTOPRAZOLE SODIUM 40 MG/1
40 TABLET, DELAYED RELEASE ORAL DAILY
Qty: 90 TABLET | Refills: 0 | Status: SHIPPED | OUTPATIENT
Start: 2021-05-26 | End: 2022-03-31 | Stop reason: ALTCHOICE

## 2021-05-26 NOTE — ASSESSMENT & PLAN NOTE
Unclear if patient took antibiotic regimen correctly based on her recollection of events; need to confirm H pylori eradication    She also had intestinal metaplasia on gastric biopsy     -repeat EGD in 1-2 years as previously recommended     -start acid reducing therapy, start Protonix 40 mg once daily    -advised patient regarding dietary lifestyle modification strategies for the mitigation of GERD     -check stool H pylori antigen, I instructed her to get this done before starting the acid reducers     -if H pylori is positive, she will require salvage regimen; if negative, she should not require any further antibiotics     -will also check a gastric emptying study, I advised patient about small frequent meals and about the importance of regular follow-up for her diabetes

## 2021-05-26 NOTE — ASSESSMENT & PLAN NOTE
Patient reports having regular bowel movements, but sometimes has to strain    Recent colonoscopy was unremarkable     -recommend regular fiber supplementation, advised about importance of regular fluid intake     -can use MiraLax as needed for constipation as well

## 2021-05-26 NOTE — PROGRESS NOTES
Follow-up Note -  [facility]  Indy Sharonda 1975 50 y o  female         Reason:  Follow-up; H pylori gastritis, GERD, constipation    HPI:  76-year-old female with history of type 2 diabetes who presents for follow-up, she was seen in our office in February 2020 for evaluation of dyspepsia, right upper quadrant pain which is felt to be most likely musculoskeletal but she had been noted on previous imaging with somewhat dilated CBD of 8 mm, she was ordered for MRI which she did not initially get done, but she did have performed a couple days ago, the report is actually still pending at this time  She underwent EGD will and colonoscopy which were essentially unremarkable except that her gastric biopsies showed H pylori organisms and intestinal metaplasia, she was recommended to continue PPI, use triple therapy  At this time, the patient does not seem to remember exactly what she was being treated for or how it was treated, she thinks she was given a medicine to take for a month  She is not taking any acid reducers at this time and does complain of a lot of heartburn and acid reflux currently  She says she has early satiety and feels full early  She has diabetes, has never been checked for gastroparesis  Denies any blood or mucus in the stools  She says her stools are hard, she admits that she does not think she is drinking enough water, but does take fiber regularly  REVIEW OF SYSTEMS:      CONSTITUTIONAL: Denies any fever, chills, or rigors  Good appetite, and no recent weight loss  HEENT: No earache or tinnitus  Denies hearing loss or visual disturbances  CARDIOVASCULAR: No chest pain or palpitations  RESPIRATORY: Denies any cough, hemoptysis, shortness of breath or dyspnea on exertion  GASTROINTESTINAL: As noted in the History of Present Illness  GENITOURINARY: No problems with urination  Denies any hematuria or dysuria    NEUROLOGIC: No dizziness or vertigo, denies headaches  MUSCULOSKELETAL: Denies any muscle or joint pain  SKIN: Denies skin rashes or itching  ENDOCRINE: Denies excessive thirst  Denies intolerance to heat or cold  PSYCHOSOCIAL: Denies depression or anxiety  Denies any recent memory loss       Past Medical History:   Diagnosis Date    Abdominal pain     right lower hliukbwh-lwuklamelvdi-onwyyys in 1/2020    Diabetes mellitus ([facility] [address] )     Iron deficiency anemia     Lab test positive for detection of COVID-19 virus 11/28/2020      Past Surgical History:   Procedure Laterality Date    COLONOSCOPY      REDUCTION MAMMAPLASTY Bilateral     11/27/19    TUBAL LIGATION      last assessed 7/10/17    UPPER GASTROINTESTINAL ENDOSCOPY       Social History     Socioeconomic History    Marital status: /Civil Union     Spouse name: Not on file    Number of children: Not on file    Years of education: Not on file    Highest education level: Not on file   Occupational History    Not on file   Social Needs    Financial resource strain: Not on file    Food insecurity     Worry: Not on file     Inability: Not on file   [employer] needs     Medical: Not on file     Non-medical: Not on file   Tobacco Use    Smoking status: Never Smoker    Smokeless tobacco: Never Used   Substance and Sexual Activity    Alcohol use: No    Drug use: No    Sexual activity: Yes     Partners: Male     Birth control/protection: Female Sterilization   Lifestyle    Physical activity     Days per week: Not on file     Minutes per session: Not on file    Stress: Not on file   Relationships    Social connections     Talks on phone: Not on file     Gets together: Not on file     Attends Congregational service: Not on file     Active member of club or organization: Not on file     Attends meetings of clubs or organizations: Not on file     Relationship status: Not on file    Intimate partner violence     Fear of current or ex partner: Not on file     Emotionally abused: Not on file     Physically abused: Not on file     Forced sexual activity: Not on file   Other Topics Concern    Not on file   Social History Narrative    Not on file     Family History   Problem Relation Age of Onset    Diabetes Mother     Hypertension Mother     Heart disease Father         myocardial infarction    Diabetes Sister     Diabetes Maternal Grandmother     Hypertension Maternal Grandmother     No Known Problems Daughter     No Known Problems Maternal Grandfather     No Known Problems Paternal Grandmother     No Known Problems Paternal Grandfather     No Known Problems Sister     No Known Problems Sister     Leukemia Brother     No Known Problems Brother     No Known Problems Brother     No Known Problems Brother     No Known Problems Daughter     No Known Problems Son     No Known Problems Son     No Known Problems Maternal Aunt     No Known Problems Maternal Aunt     No Known Problems Maternal Aunt     No Known Problems Paternal Aunt     No Known Problems Paternal Aunt     No Known Problems Paternal Aunt     No Known Problems Paternal Uncle     No Known Problems Maternal Uncle     No Known Problems Maternal Uncle     No Known Problems Maternal Uncle     Breast cancer Cousin 28        Maternal    No Known Problems Brother     Leukemia Other     Substance Abuse Neg Hx     Mental illness Neg Hx      Pollen extract  Current Outpatient Medications   Medication Sig Dispense Refill    docusate sodium (COLACE) 100 mg capsule Take 1 tablet daily with the iron pills to avoid constipation (Patient taking differently: 2 (two) times a day as needed Take 1 tablet daily with the iron pills to avoid constipation) 30 capsule 6    ferrous gluconate (FERGON) 240 (27 FE) MG tablet 1 tablet daily (Patient taking differently: daily Last dose 1/28/21) 60 tablet 6    metFORMIN (GLUCOPHAGE) 500 mg tablet Take 1 tablet (500 mg total) by mouth 2 (two) times a day with meals 180 tablet 1    multivitamin (THERAGRAN) TABS Take 1 tablet by mouth daily      PARoxetine (PAXIL) 10 mg tablet Take 1 tablet (10 mg total) by mouth daily 90 tablet 1    sitaGLIPtin (JANUVIA) 25 mg tablet Take 1 tablet (25 mg total) by mouth daily 90 tablet 1    Na Sulfate-K Sulfate-Mg Sulf 17 5-3 13-1 6 BK/445AK SOLN Take 1 applicator by mouth once for 1 dose 1 Bottle 0    pantoprazole (PROTONIX) 40 mg tablet Take 1 tablet (40 mg total) by mouth daily 90 tablet 0     No current facility-administered medications for this visit  Blood pressure (!) 88/61, pulse 65, temperature (!) 96 9 °F (36 1 °C), temperature source Temporal, height 5' 2" (1 575 m), weight 61 5 kg (135 lb 9 6 oz), not currently breastfeeding  PHYSICAL EXAM:      General Appearance:   Alert, cooperative, no distress, appears stated age    HEENT:   Normocephalic, atraumatic, anicteric      Neck:  Supple, symmetrical, trachea midline, no adenopathy;    thyroid: no enlargement/tenderness/nodules; no carotid  bruit or JVD    Lungs:   Clear to auscultation bilaterally; no rales, rhonchi or wheezing; respirations unlabored    Heart[de-identified]   S1 and S2 normal; regular rate and rhythm; no murmur, rub, or gallop  Abdomen:   Soft, non-tender, non-distended; normal bowel sounds; no masses, no organomegaly    Extremities: No edema, erythema, wounds, rashes   Rectal:   Deferred                      Lab Results   Component Value Date    WBC 7 1 04/26/2021    HGB 10 7 (L) 04/26/2021    HCT 34 5 04/26/2021    MCV 79 04/26/2021     04/26/2021     Lab Results   Component Value Date    CALCIUM 9 0 07/10/2017    K 5 3 (H) 04/26/2021    CO2 23 04/26/2021     04/26/2021    BUN 10 04/26/2021    CREATININE 0 68 04/26/2021     Lab Results   Component Value Date    ALT 6 04/26/2021    AST 10 04/26/2021    ALKPHOS 66 07/10/2017     No results found for: INR, PROTIME    No results found      ASSESSMENT & PLAN:    Constipation  Patient reports having regular bowel movements, but sometimes has to strain  Recent colonoscopy was unremarkable     -recommend regular fiber supplementation, advised about importance of regular fluid intake     -can use MiraLax as needed for constipation as well    Helicobacter pylori gastritis  Unclear if patient took antibiotic regimen correctly based on her recollection of events; need to confirm H pylori eradication    She also had intestinal metaplasia on gastric biopsy     -repeat EGD in 1-2 years as previously recommended     -start acid reducing therapy, start Protonix 40 mg once daily    -advised patient regarding dietary lifestyle modification strategies for the mitigation of GERD     -check stool H pylori antigen, I instructed her to get this done before starting the acid reducers     -if H pylori is positive, she will require salvage regimen; if negative, she should not require any further antibiotics     -will also check a gastric emptying study, I advised patient about small frequent meals and about the importance of regular follow-up for her diabetes

## 2021-06-01 DIAGNOSIS — B34.9 VIRAL INFECTION, UNSPECIFIED: ICD-10-CM

## 2021-06-01 DIAGNOSIS — B34.9 VIRAL INFECTION, UNSPECIFIED: Primary | ICD-10-CM

## 2021-06-01 LAB — SARS-COV-2 RNA RESP QL NAA+PROBE: NEGATIVE

## 2021-06-01 PROCEDURE — U0005 INFEC AGEN DETEC AMPLI PROBE: HCPCS | Performed by: FAMILY MEDICINE

## 2021-06-01 PROCEDURE — U0003 INFECTIOUS AGENT DETECTION BY NUCLEIC ACID (DNA OR RNA); SEVERE ACUTE RESPIRATORY SYNDROME CORONAVIRUS 2 (SARS-COV-2) (CORONAVIRUS DISEASE [COVID-19]), AMPLIFIED PROBE TECHNIQUE, MAKING USE OF HIGH THROUGHPUT TECHNOLOGIES AS DESCRIBED BY CMS-2020-01-R: HCPCS | Performed by: FAMILY MEDICINE

## 2021-10-05 DIAGNOSIS — E11.9 TYPE 2 DIABETES MELLITUS WITHOUT COMPLICATION, WITHOUT LONG-TERM CURRENT USE OF INSULIN (HCC): ICD-10-CM

## 2021-10-25 ENCOUNTER — OFFICE VISIT (OUTPATIENT)
Dept: FAMILY MEDICINE CLINIC | Facility: CLINIC | Age: 46
End: 2021-10-25
Payer: COMMERCIAL

## 2021-10-25 VITALS
HEART RATE: 92 BPM | DIASTOLIC BLOOD PRESSURE: 68 MMHG | RESPIRATION RATE: 16 BRPM | OXYGEN SATURATION: 100 % | BODY MASS INDEX: 25.95 KG/M2 | HEIGHT: 62 IN | WEIGHT: 141 LBS | SYSTOLIC BLOOD PRESSURE: 110 MMHG

## 2021-10-25 DIAGNOSIS — F33.9 DEPRESSION, RECURRENT (HCC): ICD-10-CM

## 2021-10-25 DIAGNOSIS — R79.0 LOW FERRITIN: ICD-10-CM

## 2021-10-25 DIAGNOSIS — K59.00 CONSTIPATION, UNSPECIFIED CONSTIPATION TYPE: ICD-10-CM

## 2021-10-25 DIAGNOSIS — M89.8X9 BONE PAIN: ICD-10-CM

## 2021-10-25 DIAGNOSIS — E11.9 TYPE 2 DIABETES MELLITUS WITHOUT COMPLICATION, WITHOUT LONG-TERM CURRENT USE OF INSULIN (HCC): Primary | ICD-10-CM

## 2021-10-25 DIAGNOSIS — E61.1 IRON DEFICIENCY: ICD-10-CM

## 2021-10-25 DIAGNOSIS — E55.9 VITAMIN D DEFICIENCY: ICD-10-CM

## 2021-10-25 PROBLEM — U07.1 COVID-19 VIRUS INFECTION: Status: RESOLVED | Noted: 2020-12-02 | Resolved: 2021-10-25

## 2021-10-25 PROBLEM — K29.70 HELICOBACTER PYLORI GASTRITIS: Status: RESOLVED | Noted: 2021-05-26 | Resolved: 2021-10-25

## 2021-10-25 PROBLEM — B96.81 HELICOBACTER PYLORI GASTRITIS: Status: RESOLVED | Noted: 2021-05-26 | Resolved: 2021-10-25

## 2021-10-25 PROBLEM — E87.5 HYPERKALEMIA: Status: RESOLVED | Noted: 2021-05-04 | Resolved: 2021-10-25

## 2021-10-25 PROBLEM — R42 DIZZINESS: Status: RESOLVED | Noted: 2019-01-10 | Resolved: 2021-10-25

## 2021-10-25 LAB
ALBUMIN SERPL-MCNC: 4.3 G/DL (ref 3.8–4.8)
ALBUMIN/CREAT UR: 8 MG/G CREAT (ref 0–29)
ALBUMIN/GLOB SERPL: 1.3 {RATIO} (ref 1.2–2.2)
ALP SERPL-CCNC: 58 IU/L (ref 44–121)
ALT SERPL-CCNC: 8 IU/L (ref 0–32)
AST SERPL-CCNC: 15 IU/L (ref 0–40)
BASOPHILS # BLD AUTO: 0.1 X10E3/UL (ref 0–0.2)
BASOPHILS NFR BLD AUTO: 1 %
BILIRUB SERPL-MCNC: 0.3 MG/DL (ref 0–1.2)
BUN SERPL-MCNC: 11 MG/DL (ref 6–24)
BUN/CREAT SERPL: 17 (ref 9–23)
CALCIUM SERPL-MCNC: 10 MG/DL (ref 8.7–10.2)
CHLORIDE SERPL-SCNC: 104 MMOL/L (ref 96–106)
CHOLEST SERPL-MCNC: 203 MG/DL (ref 100–199)
CO2 SERPL-SCNC: 20 MMOL/L (ref 20–29)
CREAT SERPL-MCNC: 0.66 MG/DL (ref 0.57–1)
CREAT UR-MCNC: 118.2 MG/DL
EOSINOPHIL # BLD AUTO: 0.1 X10E3/UL (ref 0–0.4)
EOSINOPHIL NFR BLD AUTO: 2 %
ERYTHROCYTE [DISTWIDTH] IN BLOOD BY AUTOMATED COUNT: 16.4 % (ref 11.7–15.4)
FERRITIN SERPL-MCNC: 3 NG/ML (ref 15–150)
GLOBULIN SER-MCNC: 3.3 G/DL (ref 1.5–4.5)
GLUCOSE SERPL-MCNC: 116 MG/DL (ref 65–99)
HBA1C MFR BLD: 6.6 % (ref 4.8–5.6)
HCT VFR BLD AUTO: 33.3 % (ref 34–46.6)
HDLC SERPL-MCNC: 62 MG/DL
HGB BLD-MCNC: 10.2 G/DL (ref 11.1–15.9)
IMM GRANULOCYTES # BLD: 0 X10E3/UL (ref 0–0.1)
IMM GRANULOCYTES NFR BLD: 0 %
LDLC SERPL CALC-MCNC: 119 MG/DL (ref 0–99)
LYMPHOCYTES # BLD AUTO: 3 X10E3/UL (ref 0.7–3.1)
LYMPHOCYTES NFR BLD AUTO: 39 %
MCH RBC QN AUTO: 21.5 PG (ref 26.6–33)
MCHC RBC AUTO-ENTMCNC: 30.6 G/DL (ref 31.5–35.7)
MCV RBC AUTO: 70 FL (ref 79–97)
MICROALBUMIN UR-MCNC: 9.1 UG/ML
MONOCYTES # BLD AUTO: 0.6 X10E3/UL (ref 0.1–0.9)
MONOCYTES NFR BLD AUTO: 7 %
NEUTROPHILS # BLD AUTO: 3.9 X10E3/UL (ref 1.4–7)
NEUTROPHILS NFR BLD AUTO: 51 %
PLATELET # BLD AUTO: 514 X10E3/UL (ref 150–450)
POTASSIUM SERPL-SCNC: 5 MMOL/L (ref 3.5–5.2)
PROT SERPL-MCNC: 7.6 G/DL (ref 6–8.5)
RBC # BLD AUTO: 4.74 X10E6/UL (ref 3.77–5.28)
SL AMB EGFR AFRICAN AMERICAN: 121 ML/MIN/1.73
SL AMB EGFR NON AFRICAN AMERICAN: 105 ML/MIN/1.73
SL AMB VLDL CHOLESTEROL CALC: 22 MG/DL (ref 5–40)
SODIUM SERPL-SCNC: 142 MMOL/L (ref 134–144)
TRIGL SERPL-MCNC: 123 MG/DL (ref 0–149)
TSH SERPL DL<=0.005 MIU/L-ACNC: 1.87 UIU/ML (ref 0.45–4.5)
WBC # BLD AUTO: 7.6 X10E3/UL (ref 3.4–10.8)

## 2021-10-25 PROCEDURE — 3061F NEG MICROALBUMINURIA REV: CPT | Performed by: FAMILY MEDICINE

## 2021-10-25 PROCEDURE — 99214 OFFICE O/P EST MOD 30 MIN: CPT | Performed by: FAMILY MEDICINE

## 2021-10-25 PROCEDURE — 3725F SCREEN DEPRESSION PERFORMED: CPT | Performed by: FAMILY MEDICINE

## 2021-10-25 PROCEDURE — 1036F TOBACCO NON-USER: CPT | Performed by: FAMILY MEDICINE

## 2021-10-25 PROCEDURE — 3008F BODY MASS INDEX DOCD: CPT | Performed by: FAMILY MEDICINE

## 2021-10-25 PROCEDURE — 3074F SYST BP LT 130 MM HG: CPT | Performed by: FAMILY MEDICINE

## 2021-10-25 PROCEDURE — 3044F HG A1C LEVEL LT 7.0%: CPT | Performed by: FAMILY MEDICINE

## 2021-10-25 PROCEDURE — 3078F DIAST BP <80 MM HG: CPT | Performed by: FAMILY MEDICINE

## 2021-10-25 RX ORDER — LINACLOTIDE 145 UG/1
1 CAPSULE, GELATIN COATED ORAL DAILY
Qty: 30 CAPSULE | Refills: 5 | Status: SHIPPED | OUTPATIENT
Start: 2021-10-25 | End: 2021-11-02

## 2021-11-01 DIAGNOSIS — E11.9 TYPE 2 DIABETES MELLITUS WITHOUT COMPLICATION, WITHOUT LONG-TERM CURRENT USE OF INSULIN (HCC): ICD-10-CM

## 2021-11-02 ENCOUNTER — TELEPHONE (OUTPATIENT)
Dept: FAMILY MEDICINE CLINIC | Facility: CLINIC | Age: 46
End: 2021-11-02

## 2021-11-18 ENCOUNTER — TELEPHONE (OUTPATIENT)
Dept: FAMILY MEDICINE CLINIC | Facility: CLINIC | Age: 46
End: 2021-11-18

## 2022-03-02 LAB
ALBUMIN SERPL-MCNC: 4 G/DL (ref 3.8–4.8)
ALBUMIN/GLOB SERPL: 1.2 {RATIO} (ref 1.2–2.2)
ALP SERPL-CCNC: 58 IU/L (ref 44–121)
ALT SERPL-CCNC: 12 IU/L (ref 0–32)
AST SERPL-CCNC: 14 IU/L (ref 0–40)
BASOPHILS # BLD AUTO: 0.1 X10E3/UL (ref 0–0.2)
BASOPHILS NFR BLD AUTO: 1 %
BILIRUB SERPL-MCNC: <0.2 MG/DL (ref 0–1.2)
BUN SERPL-MCNC: 14 MG/DL (ref 6–24)
BUN/CREAT SERPL: 24 (ref 9–23)
CALCIUM SERPL-MCNC: 9.3 MG/DL (ref 8.7–10.2)
CHLORIDE SERPL-SCNC: 103 MMOL/L (ref 96–106)
CHOLEST SERPL-MCNC: 200 MG/DL (ref 100–199)
CO2 SERPL-SCNC: 20 MMOL/L (ref 20–29)
CREAT SERPL-MCNC: 0.59 MG/DL (ref 0.57–1)
EGFR: 111 ML/MIN/1.73
EOSINOPHIL # BLD AUTO: 0.2 X10E3/UL (ref 0–0.4)
EOSINOPHIL NFR BLD AUTO: 2 %
ERYTHROCYTE [DISTWIDTH] IN BLOOD BY AUTOMATED COUNT: 14.6 % (ref 11.7–15.4)
GLOBULIN SER-MCNC: 3.3 G/DL (ref 1.5–4.5)
GLUCOSE SERPL-MCNC: 110 MG/DL (ref 65–99)
HBA1C MFR BLD: 6.5 % (ref 4.8–5.6)
HCT VFR BLD AUTO: 35.9 % (ref 34–46.6)
HDLC SERPL-MCNC: 63 MG/DL
HGB BLD-MCNC: 12 G/DL (ref 11.1–15.9)
IMM GRANULOCYTES # BLD: 0 X10E3/UL (ref 0–0.1)
IMM GRANULOCYTES NFR BLD: 0 %
LDLC SERPL CALC-MCNC: 121 MG/DL (ref 0–99)
LYMPHOCYTES # BLD AUTO: 2.4 X10E3/UL (ref 0.7–3.1)
LYMPHOCYTES NFR BLD AUTO: 32 %
MCH RBC QN AUTO: 26.9 PG (ref 26.6–33)
MCHC RBC AUTO-ENTMCNC: 33.4 G/DL (ref 31.5–35.7)
MCV RBC AUTO: 81 FL (ref 79–97)
MONOCYTES # BLD AUTO: 0.6 X10E3/UL (ref 0.1–0.9)
MONOCYTES NFR BLD AUTO: 8 %
NEUTROPHILS # BLD AUTO: 4.3 X10E3/UL (ref 1.4–7)
NEUTROPHILS NFR BLD AUTO: 57 %
PLATELET # BLD AUTO: 429 X10E3/UL (ref 150–450)
POTASSIUM SERPL-SCNC: 4.7 MMOL/L (ref 3.5–5.2)
PROT SERPL-MCNC: 7.3 G/DL (ref 6–8.5)
RBC # BLD AUTO: 4.46 X10E6/UL (ref 3.77–5.28)
SL AMB VLDL CHOLESTEROL CALC: 16 MG/DL (ref 5–40)
SODIUM SERPL-SCNC: 140 MMOL/L (ref 134–144)
TRIGL SERPL-MCNC: 92 MG/DL (ref 0–149)
WBC # BLD AUTO: 7.6 X10E3/UL (ref 3.4–10.8)

## 2022-03-31 ENCOUNTER — OFFICE VISIT (OUTPATIENT)
Dept: FAMILY MEDICINE CLINIC | Facility: CLINIC | Age: 47
End: 2022-03-31
Payer: COMMERCIAL

## 2022-03-31 VITALS
HEART RATE: 89 BPM | BODY MASS INDEX: 26.31 KG/M2 | SYSTOLIC BLOOD PRESSURE: 110 MMHG | OXYGEN SATURATION: 99 % | WEIGHT: 143 LBS | HEIGHT: 62 IN | DIASTOLIC BLOOD PRESSURE: 68 MMHG | RESPIRATION RATE: 16 BRPM

## 2022-03-31 DIAGNOSIS — E11.9 TYPE 2 DIABETES MELLITUS WITHOUT COMPLICATION, WITHOUT LONG-TERM CURRENT USE OF INSULIN (HCC): Primary | ICD-10-CM

## 2022-03-31 DIAGNOSIS — E78.2 MIXED HYPERLIPIDEMIA: ICD-10-CM

## 2022-03-31 DIAGNOSIS — H61.23 BILATERAL IMPACTED CERUMEN: ICD-10-CM

## 2022-03-31 DIAGNOSIS — G89.29 CHRONIC LEFT SHOULDER PAIN: ICD-10-CM

## 2022-03-31 DIAGNOSIS — M25.512 CHRONIC LEFT SHOULDER PAIN: ICD-10-CM

## 2022-03-31 DIAGNOSIS — K59.00 CONSTIPATION, UNSPECIFIED CONSTIPATION TYPE: ICD-10-CM

## 2022-03-31 DIAGNOSIS — Z12.31 ENCOUNTER FOR SCREENING MAMMOGRAM FOR BREAST CANCER: ICD-10-CM

## 2022-03-31 PROBLEM — R68.81 EARLY SATIETY: Status: RESOLVED | Noted: 2020-11-16 | Resolved: 2022-03-31

## 2022-03-31 PROBLEM — E61.1 IRON DEFICIENCY: Status: RESOLVED | Noted: 2021-10-25 | Resolved: 2022-03-31

## 2022-03-31 PROCEDURE — 99214 OFFICE O/P EST MOD 30 MIN: CPT | Performed by: FAMILY MEDICINE

## 2022-03-31 RX ORDER — SIMVASTATIN 10 MG
10 TABLET ORAL
Qty: 90 TABLET | Refills: 3 | Status: SHIPPED | OUTPATIENT
Start: 2022-03-31

## 2022-03-31 RX ORDER — PLECANATIDE 3 MG/1
1 TABLET ORAL DAILY
Qty: 90 TABLET | Refills: 1 | Status: SHIPPED | OUTPATIENT
Start: 2022-03-31

## 2022-03-31 NOTE — PROGRESS NOTES
Assessment/Plan:    Problem List Items Addressed This Visit        Endocrine    Type 2 diabetes mellitus without complication ([facility] [address] ) - Primary       Lab Results   Component Value Date    HGBA1C 6 5 (H) 03/01/2022   She is stable, continue same medication and continue her low carb diet         Relevant Medications    simvastatin (ZOCOR) 10 mg tablet    sitaGLIPtin (JANUVIA) 25 mg tablet    metFORMIN (GLUCOPHAGE) 500 mg tablet    Other Relevant Orders    CBC and differential    Comprehensive metabolic panel    Hemoglobin A1C    Lipid panel       Nervous and Auditory    Bilateral impacted cerumen     She has no problem in hearing, advised to avoid Q-tips            Other    Encounter for screening mammogram for breast cancer    Relevant Orders    Mammo screening bilateral w 3d & cad    Constipation     She says since she has been taking trulence her constipation is under control         Relevant Medications    Plecanatide (Trulance) 3 MG TABS    Mixed hyperlipidemia     Lab Results   Component Value Date    LDLCALC 121 (H) 03/01/2022   Her cholesterol is going up, discussed with her she is diabetic she needs to be on statin and start her on simvastatin 10 mg           Relevant Medications    simvastatin (ZOCOR) 10 mg tablet    Chronic left shoulder pain     , intermittently she feels discomfort in the left shoulder and left upper arm, no chest pain  She has discomfort on internal and external rotation of the shoulder, discussed with her about rotator cuff tendinitis, she can do stretching and avoid heavy lifting, and if it continues she can see the orthopedic               Return in about 4 months (around 7/31/2022) for Recheck  Chief Complaint   Patient presents with    Follow-up       Subjective:   Patient ID: Minh Citizen is a 48 y o  female    Follow-up on diabetes, she says she is not taking iron anymore, she just take multivitamin which has iron, her periods are slightly heavy 1 day,   She always feels tired, She says her constipation is under control witth trulence  She does not take statins, she was trying to control her diet but her cholesterol remains elevated    HPI    Review of Systems   Constitutional: Positive for fatigue  Negative for activity change, appetite change, chills, fever and unexpected weight change  HENT: Negative for congestion, ear discharge, ear pain, nosebleeds, postnasal drip, rhinorrhea, sinus pressure, sneezing, sore throat, trouble swallowing and voice change  Eyes: Negative for photophobia, pain, discharge, redness and itching  Respiratory: Negative for cough, chest tightness, shortness of breath and wheezing  Cardiovascular: Negative for chest pain, palpitations and leg swelling  Gastrointestinal: Negative for abdominal pain, constipation, diarrhea, nausea and vomiting  Endocrine: Negative for polyuria  Genitourinary: Negative for dysuria, frequency and urgency  Musculoskeletal: Negative for arthralgias, back pain, gait problem, myalgias and neck pain  Skin: Negative for color change, pallor and rash  Allergic/Immunologic: Negative for environmental allergies and food allergies  Neurological: Negative for dizziness, weakness, light-headedness and headaches  Hematological: Negative for adenopathy  Does not bruise/bleed easily  Psychiatric/Behavioral: Negative for behavioral problems  The patient is not nervous/anxious  Objective:  Physical Exam  Vitals and nursing note reviewed  Constitutional:       Appearance: She is well-developed  HENT:      Head: Normocephalic and atraumatic  Right Ear: External ear normal  There is impacted cerumen  Left Ear: External ear normal  There is impacted cerumen  Eyes:      General: No scleral icterus  Conjunctiva/sclera: Conjunctivae normal       Pupils: Pupils are equal, round, and reactive to light  Neck:      Thyroid: No thyromegaly     Cardiovascular:      Rate and Rhythm: Normal rate and regular rhythm  Heart sounds: Normal heart sounds  No murmur heard  Pulmonary:      Effort: Pulmonary effort is normal       Breath sounds: Normal breath sounds  No wheezing or rales  Musculoskeletal:      Cervical back: Normal range of motion and neck supple  Right lower leg: No edema  Left lower leg: No edema  Lymphadenopathy:      Cervical: No cervical adenopathy  Skin:     Findings: No erythema or rash  Neurological:      General: No focal deficit present  Mental Status: She is alert     Psychiatric:         Mood and Affect: Mood normal             Past Surgical History:   Procedure Laterality Date    COLONOSCOPY      REDUCTION MAMMAPLASTY Bilateral     11/27/19    TUBAL LIGATION      last assessed 7/10/17    UPPER GASTROINTESTINAL ENDOSCOPY         Family History   Problem Relation Age of Onset    Diabetes Mother     Hypertension Mother     Heart disease Father         myocardial infarction    Diabetes Sister     Diabetes Maternal Grandmother     Hypertension Maternal Grandmother     No Known Problems Daughter     No Known Problems Maternal Grandfather     No Known Problems Paternal Grandmother     No Known Problems Paternal Grandfather     No Known Problems Sister     No Known Problems Sister     Leukemia Brother     No Known Problems Brother     No Known Problems Brother     No Known Problems Brother     No Known Problems Daughter     No Known Problems Son     No Known Problems Son     No Known Problems Maternal Aunt     No Known Problems Maternal Aunt     No Known Problems Maternal Aunt     No Known Problems Paternal Aunt     No Known Problems Paternal Aunt     No Known Problems Paternal Aunt     No Known Problems Paternal Uncle     No Known Problems Maternal Uncle     No Known Problems Maternal Uncle     No Known Problems Maternal Uncle     Breast cancer Cousin 28        Maternal    No Known Problems Brother     Leukemia Other     Substance Abuse Neg Hx     Mental illness Neg Hx          Current Outpatient Medications:     multivitamin (THERAGRAN) TABS, Take 1 tablet by mouth daily, Disp: , Rfl:     Plecanatide (Trulance) 3 MG TABS, Take 1 tablet by mouth daily, Disp: 90 tablet, Rfl: 1    sitaGLIPtin (JANUVIA) 25 mg tablet, Take 1 tablet (25 mg total) by mouth daily, Disp: 90 tablet, Rfl: 1    metFORMIN (GLUCOPHAGE) 500 mg tablet, Take 1 tablet (500 mg total) by mouth 2 (two) times a day with meals, Disp: 180 tablet, Rfl: 1    simvastatin (ZOCOR) 10 mg tablet, Take 1 tablet (10 mg total) by mouth daily at bedtime, Disp: 90 tablet, Rfl: 3    Allergies   Allergen Reactions    Pollen Extract        Vitals:    03/31/22 1533   BP: 110/68   Pulse: 89   Resp: 16   SpO2: 99%   Weight: 64 9 kg (143 lb)   Height: 5' 2" (1 575 m)

## 2022-07-27 LAB
ALBUMIN SERPL-MCNC: 4 G/DL (ref 3.8–4.8)
ALBUMIN/GLOB SERPL: 1.3 {RATIO} (ref 1.2–2.2)
ALP SERPL-CCNC: 61 IU/L (ref 44–121)
ALT SERPL-CCNC: 7 IU/L (ref 0–32)
AST SERPL-CCNC: 12 IU/L (ref 0–40)
BASOPHILS # BLD AUTO: 0.1 X10E3/UL (ref 0–0.2)
BASOPHILS NFR BLD AUTO: 1 %
BILIRUB SERPL-MCNC: 0.3 MG/DL (ref 0–1.2)
BUN SERPL-MCNC: 7 MG/DL (ref 6–24)
BUN/CREAT SERPL: 13 (ref 9–23)
CALCIUM SERPL-MCNC: 9 MG/DL (ref 8.7–10.2)
CHLORIDE SERPL-SCNC: 103 MMOL/L (ref 96–106)
CHOLEST SERPL-MCNC: 159 MG/DL (ref 100–199)
CO2 SERPL-SCNC: 24 MMOL/L (ref 20–29)
CREAT SERPL-MCNC: 0.56 MG/DL (ref 0.57–1)
EGFR: 113 ML/MIN/1.73
EOSINOPHIL # BLD AUTO: 0.1 X10E3/UL (ref 0–0.4)
EOSINOPHIL NFR BLD AUTO: 2 %
ERYTHROCYTE [DISTWIDTH] IN BLOOD BY AUTOMATED COUNT: 13.7 % (ref 11.7–15.4)
GLOBULIN SER-MCNC: 3 G/DL (ref 1.5–4.5)
GLUCOSE SERPL-MCNC: 101 MG/DL (ref 65–99)
HBA1C MFR BLD: 6.8 % (ref 4.8–5.6)
HCT VFR BLD AUTO: 34.1 % (ref 34–46.6)
HDLC SERPL-MCNC: 47 MG/DL
HGB BLD-MCNC: 11.2 G/DL (ref 11.1–15.9)
IMM GRANULOCYTES # BLD: 0 X10E3/UL (ref 0–0.1)
IMM GRANULOCYTES NFR BLD: 0 %
LDLC SERPL CALC-MCNC: 87 MG/DL (ref 0–99)
LYMPHOCYTES # BLD AUTO: 2.3 X10E3/UL (ref 0.7–3.1)
LYMPHOCYTES NFR BLD AUTO: 31 %
MCH RBC QN AUTO: 25.9 PG (ref 26.6–33)
MCHC RBC AUTO-ENTMCNC: 32.8 G/DL (ref 31.5–35.7)
MCV RBC AUTO: 79 FL (ref 79–97)
MONOCYTES # BLD AUTO: 0.4 X10E3/UL (ref 0.1–0.9)
MONOCYTES NFR BLD AUTO: 6 %
NEUTROPHILS # BLD AUTO: 4.4 X10E3/UL (ref 1.4–7)
NEUTROPHILS NFR BLD AUTO: 60 %
PLATELET # BLD AUTO: 402 X10E3/UL (ref 150–450)
POTASSIUM SERPL-SCNC: 4.3 MMOL/L (ref 3.5–5.2)
PROT SERPL-MCNC: 7 G/DL (ref 6–8.5)
RBC # BLD AUTO: 4.32 X10E6/UL (ref 3.77–5.28)
SL AMB VLDL CHOLESTEROL CALC: 25 MG/DL (ref 5–40)
SODIUM SERPL-SCNC: 138 MMOL/L (ref 134–144)
TRIGL SERPL-MCNC: 142 MG/DL (ref 0–149)
WBC # BLD AUTO: 7.3 X10E3/UL (ref 3.4–10.8)

## 2022-07-27 PROCEDURE — 3044F HG A1C LEVEL LT 7.0%: CPT | Performed by: FAMILY MEDICINE

## 2022-07-28 ENCOUNTER — OFFICE VISIT (OUTPATIENT)
Dept: FAMILY MEDICINE CLINIC | Facility: CLINIC | Age: 47
End: 2022-07-28
Payer: COMMERCIAL

## 2022-07-28 VITALS
SYSTOLIC BLOOD PRESSURE: 120 MMHG | RESPIRATION RATE: 16 BRPM | HEART RATE: 97 BPM | DIASTOLIC BLOOD PRESSURE: 70 MMHG | WEIGHT: 144 LBS | OXYGEN SATURATION: 97 % | HEIGHT: 62 IN | BODY MASS INDEX: 26.5 KG/M2

## 2022-07-28 DIAGNOSIS — E78.2 MIXED HYPERLIPIDEMIA: ICD-10-CM

## 2022-07-28 DIAGNOSIS — D50.9 IRON DEFICIENCY ANEMIA, UNSPECIFIED IRON DEFICIENCY ANEMIA TYPE: ICD-10-CM

## 2022-07-28 DIAGNOSIS — E11.9 TYPE 2 DIABETES MELLITUS WITHOUT COMPLICATION, WITHOUT LONG-TERM CURRENT USE OF INSULIN (HCC): Primary | ICD-10-CM

## 2022-07-28 PROBLEM — K30 INDIGESTION: Status: RESOLVED | Noted: 2020-01-20 | Resolved: 2022-07-28

## 2022-07-28 PROCEDURE — 3078F DIAST BP <80 MM HG: CPT | Performed by: FAMILY MEDICINE

## 2022-07-28 PROCEDURE — 3725F SCREEN DEPRESSION PERFORMED: CPT | Performed by: FAMILY MEDICINE

## 2022-07-28 PROCEDURE — 99214 OFFICE O/P EST MOD 30 MIN: CPT | Performed by: FAMILY MEDICINE

## 2022-07-28 PROCEDURE — 3074F SYST BP LT 130 MM HG: CPT | Performed by: FAMILY MEDICINE

## 2022-07-28 NOTE — ASSESSMENT & PLAN NOTE
Lab Results   Component Value Date    HGBA1C 6 8 (H) 07/26/2022   Discussed with her that A1c has gone up, she needs to be on low-fat low carb diet and do regular exercise ,discussed about increasing Januvia but she does not agree she will repeat her labs in 4 months

## 2022-07-28 NOTE — PATIENT INSTRUCTIONS
10% - bad control"> 10% - bad control,Hemoglobin A1c (HbA1c) greater than 10% indicating poor diabetic control,Haemoglobin A1c greater than 10% indicating poor diabetic control">   Diabetes Mellitus Type 2 in Adults, Ambulatory Care   GENERAL INFORMATION:   Diabetes mellitus type 2  is a disease that affects how your body uses glucose (sugar)  Insulin helps move sugar out of the blood so it can be used for energy  Normally, when the blood sugar level increases, the pancreas makes more insulin  Type 2 diabetes develops because either the body cannot make enough insulin, or it cannot use the insulin correctly  After many years, your pancreas may stop making insulin  Common symptoms include the following:   · More hunger or thirst than usual     · Frequent urination     · Weight loss without trying     · Blurred vision  Seek immediate care for the following symptoms:   · Severe abdominal pain, or pain that spreads to your back  You may also be vomiting  · Trouble staying awake or focusing    · Shaking or sweating    · Blurred or double vision    · Breath has a fruity, sweet smell    · Breathing is deep and labored, or rapid and shallow    · Heartbeat is fast and weak  Treatment for diabetes mellitus type 2  includes keeping your blood sugar at a normal level  You must eat the right foods, and exercise regularly  You may also need medicine if you cannot control your blood sugar level with nutrition and exercise  Manage diabetes mellitus type 2:   · Check your blood sugar level  You will be taught how to check a small drop of blood in a glucose monitor  Ask your healthcare provider when and how often to check during the day  Ask your healthcare provider what your blood sugar levels should be when you check them  · Keep track of carbohydrates (sugar and starchy foods)  Your blood sugar level can get too high if you eat too many carbohydrates   Your dietitian will help you plan meals and snacks that have the right amount of carbohydrates  · Eat low-fat foods  Some examples are skinless chicken and low-fat milk  · Eat less sodium (salt)  Some examples of high-sodium foods to limit are soy sauce, potato chips, and soup  Do not add salt to food you cook  Limit your use of table salt  · Eat high-fiber foods  Foods that are a good source of fiber include vegetables, whole grain bread, and beans  · Limit alcohol  Alcohol affects your blood sugar level and can make it harder to manage your diabetes  Women should limit alcohol to 1 drink a day  Men should limit alcohol to 2 drinks a day  A drink of alcohol is 12 ounces of beer, 5 ounces of wine, or 1½ ounces of liquor  · Get regular exercise  Exercise can help keep your blood sugar level steady, decrease your risk of heart disease, and help you lose weight  Exercise for at least 30 minutes, 5 days a week  Include muscle strengthening activities 2 days each week  Work with your healthcare provider to create an exercise plan  · Check your feet each day  for injuries or open sores  Ask your healthcare provider for activities you can do if you have an open sore  · Quit smoking  If you smoke, it is never too late to quit  Smoking can worsen the problems that may occur with diabetes  Ask your healthcare provider for information about how to stop smoking if you are having trouble quitting  · Ask about your weight:  Ask healthcare providers if you need to lose weight, and how much to lose  Ask them to help you with a weight loss program  Even a 10 to 15 pound weight loss can help you manage your blood sugar level  · Carry medical alert identification  Wear medical alert jewelry or carry a card that says you have diabetes  Ask your healthcare provider where to get these items  · Ask about vaccines  Diabetes puts you at risk of serious illness if you get the flu, pneumonia, or hepatitis   Ask your healthcare provider if you should get a flu, pneumonia, or hepatitis B vaccine, and when to get the vaccine  Follow up with your healthcare provider as directed:  Write down your questions so you remember to ask them during your visits  CARE AGREEMENT:   You have the right to help plan your care  Learn about your health condition and how it may be treated  Discuss treatment options with your caregivers to decide what care you want to receive  You always have the right to refuse treatment  The above information is an  only  It is not intended as medical advice for individual conditions or treatments  Talk to your doctor, nurse or pharmacist before following any medical regimen to see if it is safe and effective for you  © 2014 [address] is for End User's use only and may not be sold, redistributed or otherwise used for commercial purposes  All illustrations and images included in CareNotes® are the copyrighted property of A D A M , Inc  or [facility]

## 2022-07-28 NOTE — ASSESSMENT & PLAN NOTE
Iron deficiency anemia, she has slightly low hemoglobin compared to last time advised to keep taking iron supplement at least few times a week to keep her hemoglobin up as she gets a menstruation and she drops her hemoglobin

## 2022-07-28 NOTE — PROGRESS NOTES
Assessment/Plan:    Problem List Items Addressed This Visit        Endocrine    Type 2 diabetes mellitus without complication ([address] ) - Primary       Lab Results   Component Value Date    HGBA1C 6 8 (H) 07/26/2022   Discussed with her that A1c has gone up, she needs to be on low-fat low carb diet and do regular exercise ,discussed about increasing Januvia but she does not agree she will repeat her labs in 4 months         Relevant Orders    CBC and differential    Comprehensive metabolic panel    Hemoglobin A1C    Lipid panel       Other    Anemia     Iron deficiency anemia, she has slightly low hemoglobin compared to last time advised to keep taking iron supplement at least few times a week to keep her hemoglobin up as she gets a menstruation and she drops her hemoglobin         Mixed hyperlipidemia     Lab Results   Component Value Date    LDLCALC 87 07/26/2022   She is doing well, continue simvastatin and low-fat diet             Relevant Orders    CBC and differential    Comprehensive metabolic panel    Lipid panel      Blood pressure remained under well control 120/70    Return in about 4 months (around 11/28/2022) for Recheck  Chief Complaint   Patient presents with    Follow-up       Subjective:   Patient ID: Magaly Muller is a 48 y o  female  Diabetes  She presents for her follow-up diabetic visit  She has type 2 diabetes mellitus  Her disease course has been stable  Pertinent negatives for hypoglycemia include no dizziness, headaches, nervousness/anxiousness or pallor  Pertinent negatives for diabetes include no blurred vision, no chest pain, no fatigue, no foot ulcerations, no polydipsia, no polyphagia, no polyuria, no visual change, no weakness and no weight loss  There are no hypoglycemic complications  Symptoms are stable  There are no diabetic complications  Risk factors for coronary artery disease include diabetes mellitus and dyslipidemia  She participates in exercise intermittently   There is no change in her home blood glucose trend  She does not see a podiatrist   Hyperlipidemia on simvastatin 10 mg and tries to eat low-fat diet  She has history of iron deficiency anemia currently not taking iron, takes multivitamin she still get her periods     Review of Systems   Constitutional: Negative for activity change, appetite change, chills, fatigue, fever, unexpected weight change and weight loss  HENT: Negative for congestion, ear discharge, ear pain, nosebleeds, postnasal drip, rhinorrhea, sinus pressure, sneezing, sore throat, trouble swallowing and voice change  Eyes: Negative for blurred vision, photophobia, pain, discharge, redness and itching  Respiratory: Negative for cough, chest tightness, shortness of breath and wheezing  Cardiovascular: Negative for chest pain, palpitations and leg swelling  Gastrointestinal: Negative for abdominal pain, constipation, diarrhea, nausea and vomiting  Endocrine: Negative for polydipsia, polyphagia and polyuria  Genitourinary: Negative for dysuria, frequency and urgency  Musculoskeletal: Negative for arthralgias, back pain, myalgias and neck pain  Skin: Negative for color change, pallor and rash  Allergic/Immunologic: Negative for environmental allergies and food allergies  Neurological: Negative for dizziness, weakness, light-headedness and headaches  Hematological: Negative for adenopathy  Does not bruise/bleed easily  Psychiatric/Behavioral: Negative for behavioral problems  The patient is not nervous/anxious  Objective:  Physical Exam  Vitals and nursing note reviewed  Constitutional:       Appearance: She is well-developed  HENT:      Head: Normocephalic and atraumatic  Eyes:      General: No scleral icterus  Right eye: No discharge  Left eye: No discharge  Conjunctiva/sclera: Conjunctivae normal       Pupils: Pupils are equal, round, and reactive to light  Neck:      Thyroid: No thyromegaly  Trachea: No tracheal deviation  Cardiovascular:      Rate and Rhythm: Normal rate and regular rhythm  Heart sounds: Normal heart sounds  No murmur heard  Pulmonary:      Effort: Pulmonary effort is normal  No respiratory distress  Breath sounds: Normal breath sounds  No wheezing or rales  Abdominal:      General: Bowel sounds are normal  There is no distension  Palpations: Abdomen is soft  There is no mass  Tenderness: There is no abdominal tenderness  There is no rebound  Musculoskeletal:         General: Normal range of motion  Cervical back: Normal range of motion and neck supple  Right lower leg: No edema  Left lower leg: No edema  Lymphadenopathy:      Cervical: No cervical adenopathy  Skin:     General: Skin is warm  Coloration: Skin is not pale  Findings: No erythema or rash  Neurological:      General: No focal deficit present  Mental Status: She is alert and oriented to person, place, and time  Cranial Nerves: No cranial nerve deficit  Deep Tendon Reflexes: Reflexes are normal and symmetric  Psychiatric:         Behavior: Behavior normal          Thought Content:  Thought content normal          Judgment: Judgment normal             Past Surgical History:   Procedure Laterality Date    COLONOSCOPY      REDUCTION MAMMAPLASTY Bilateral     11/27/19    TUBAL LIGATION      last assessed 7/10/17    UPPER GASTROINTESTINAL ENDOSCOPY         Family History   Problem Relation Age of Onset    Diabetes Mother     Hypertension Mother     Heart disease Father         myocardial infarction    Diabetes Sister     Diabetes Maternal Grandmother     Hypertension Maternal Grandmother     No Known Problems Daughter     No Known Problems Maternal Grandfather     No Known Problems Paternal Grandmother     No Known Problems Paternal Grandfather     No Known Problems Sister     No Known Problems Sister     Leukemia Brother     No Known Problems Brother     No Known Problems Brother     No Known Problems Brother     No Known Problems Daughter     No Known Problems Son     No Known Problems Son     No Known Problems Maternal Aunt     No Known Problems Maternal Aunt     No Known Problems Maternal Aunt     No Known Problems Paternal Aunt     No Known Problems Paternal Aunt     No Known Problems Paternal Aunt     No Known Problems Paternal Uncle     No Known Problems Maternal Uncle     No Known Problems Maternal Uncle     No Known Problems Maternal Uncle     Breast cancer Cousin 28        Maternal    No Known Problems Brother     Leukemia Other     Substance Abuse Neg Hx     Mental illness Neg Hx          Current Outpatient Medications:     metFORMIN (GLUCOPHAGE) 500 mg tablet, Take 1 tablet (500 mg total) by mouth 2 (two) times a day with meals, Disp: 180 tablet, Rfl: 1    multivitamin (THERAGRAN) TABS, Take 1 tablet by mouth daily, Disp: , Rfl:     simvastatin (ZOCOR) 10 mg tablet, Take 1 tablet (10 mg total) by mouth daily at bedtime, Disp: 90 tablet, Rfl: 3    sitaGLIPtin (JANUVIA) 25 mg tablet, Take 1 tablet (25 mg total) by mouth daily, Disp: 90 tablet, Rfl: 1    Plecanatide (Trulance) 3 MG TABS, Take 1 tablet by mouth daily (Patient not taking: Reported on 7/28/2022), Disp: 90 tablet, Rfl: 1    Allergies   Allergen Reactions    Pollen Extract        Vitals:    07/28/22 0956   BP: 120/70   Pulse: 97   Resp: 16   SpO2: 97%   Weight: 65 3 kg (144 lb)   Height: 5' 2" (1 575 m)

## 2022-07-28 NOTE — ASSESSMENT & PLAN NOTE
Lab Results   Component Value Date    [address] 07/26/2022   She is doing well, continue simvastatin and low-fat diet

## 2022-08-09 ENCOUNTER — TELEPHONE (OUTPATIENT)
Dept: FAMILY MEDICINE CLINIC | Facility: CLINIC | Age: 47
End: 2022-08-09

## 2022-08-09 DIAGNOSIS — N64.4 BREAST PAIN IN FEMALE: Primary | ICD-10-CM

## 2022-08-09 NOTE — TELEPHONE ENCOUNTER
Patient called, when she call to schedule her mammo she mentioned she was having pain in her right breast, so they want her to get an order from the doctor for a Diag R Breast Mammo w/U/S if needed    Please call patient when orders are ready so she can call to schedule

## 2022-09-09 ENCOUNTER — HOSPITAL ENCOUNTER (OUTPATIENT)
Dept: RADIOLOGY | Facility: HOSPITAL | Age: 47
Discharge: HOME | End: 2022-09-09
Attending: FAMILY MEDICINE
Payer: COMMERCIAL

## 2022-09-09 VITALS — HEIGHT: 62 IN | BODY MASS INDEX: 25.76 KG/M2 | WEIGHT: 140 LBS

## 2022-09-09 DIAGNOSIS — N64.4 BREAST PAIN IN FEMALE: ICD-10-CM

## 2022-09-09 DIAGNOSIS — N64.4 BREAST PAIN, RIGHT: ICD-10-CM

## 2022-09-09 PROCEDURE — 76642 ULTRASOUND BREAST LIMITED: CPT

## 2022-09-09 PROCEDURE — G0279 TOMOSYNTHESIS, MAMMO: HCPCS

## 2022-09-09 PROCEDURE — 77066 DX MAMMO INCL CAD BI: CPT

## 2022-10-11 PROBLEM — H61.23 BILATERAL IMPACTED CERUMEN: Status: RESOLVED | Noted: 2019-01-10 | Resolved: 2022-10-11

## 2022-12-21 ENCOUNTER — TELEPHONE (OUTPATIENT)
Dept: GASTROENTEROLOGY | Facility: AMBULARY SURGERY CENTER | Age: 47
End: 2022-12-21

## 2022-12-21 NOTE — TELEPHONE ENCOUNTER
Called patient to schedule repeat EGD that is coming due  Patient having changing in symptoms and requested an office visit before scheduling EGD  Patient has office visit set for 1/5/23 at the [facility] office with Dr Mann Bio  Confirmation e-mail sent to patient

## 2022-12-22 DIAGNOSIS — E11.9 TYPE 2 DIABETES MELLITUS WITHOUT COMPLICATION, WITHOUT LONG-TERM CURRENT USE OF INSULIN (HCC): ICD-10-CM

## 2023-05-17 LAB
ALBUMIN SERPL-MCNC: 4 G/DL (ref 3.8–4.8)
ALBUMIN/GLOB SERPL: 1.3 {RATIO} (ref 1.2–2.2)
ALP SERPL-CCNC: 70 IU/L (ref 44–121)
ALT SERPL-CCNC: 10 IU/L (ref 0–32)
AST SERPL-CCNC: 11 IU/L (ref 0–40)
BASOPHILS # BLD AUTO: 0.1 X10E3/UL (ref 0–0.2)
BASOPHILS NFR BLD AUTO: 1 %
BILIRUB SERPL-MCNC: <0.2 MG/DL (ref 0–1.2)
BUN SERPL-MCNC: 8 MG/DL (ref 6–24)
BUN/CREAT SERPL: 11 (ref 9–23)
CALCIUM SERPL-MCNC: 9.5 MG/DL (ref 8.7–10.2)
CHLORIDE SERPL-SCNC: 102 MMOL/L (ref 96–106)
CHOLEST SERPL-MCNC: 196 MG/DL (ref 100–199)
CO2 SERPL-SCNC: 22 MMOL/L (ref 20–29)
CREAT SERPL-MCNC: 0.72 MG/DL (ref 0.57–1)
EGFR: 102 ML/MIN/1.73
EOSINOPHIL # BLD AUTO: 0.1 X10E3/UL (ref 0–0.4)
EOSINOPHIL NFR BLD AUTO: 2 %
ERYTHROCYTE [DISTWIDTH] IN BLOOD BY AUTOMATED COUNT: 15 % (ref 11.7–15.4)
GLOBULIN SER-MCNC: 3.1 G/DL (ref 1.5–4.5)
GLUCOSE SERPL-MCNC: 145 MG/DL (ref 70–99)
HBA1C MFR BLD: 7.5 % (ref 4.8–5.6)
HCT VFR BLD AUTO: 33.5 % (ref 34–46.6)
HDLC SERPL-MCNC: 58 MG/DL
HGB BLD-MCNC: 11 G/DL (ref 11.1–15.9)
IMM GRANULOCYTES # BLD: 0 X10E3/UL (ref 0–0.1)
IMM GRANULOCYTES NFR BLD: 0 %
LDLC SERPL CALC-MCNC: 104 MG/DL (ref 0–99)
LYMPHOCYTES # BLD AUTO: 2.7 X10E3/UL (ref 0.7–3.1)
LYMPHOCYTES NFR BLD AUTO: 33 %
MCH RBC QN AUTO: 25 PG (ref 26.6–33)
MCHC RBC AUTO-ENTMCNC: 32.8 G/DL (ref 31.5–35.7)
MCV RBC AUTO: 76 FL (ref 79–97)
MONOCYTES # BLD AUTO: 0.6 X10E3/UL (ref 0.1–0.9)
MONOCYTES NFR BLD AUTO: 7 %
NEUTROPHILS # BLD AUTO: 4.8 X10E3/UL (ref 1.4–7)
NEUTROPHILS NFR BLD AUTO: 57 %
PLATELET # BLD AUTO: 440 X10E3/UL (ref 150–450)
POTASSIUM SERPL-SCNC: 5.4 MMOL/L (ref 3.5–5.2)
PROT SERPL-MCNC: 7.1 G/DL (ref 6–8.5)
RBC # BLD AUTO: 4.4 X10E6/UL (ref 3.77–5.28)
SL AMB VLDL CHOLESTEROL CALC: 34 MG/DL (ref 5–40)
SODIUM SERPL-SCNC: 137 MMOL/L (ref 134–144)
TRIGL SERPL-MCNC: 200 MG/DL (ref 0–149)
WBC # BLD AUTO: 8.3 X10E3/UL (ref 3.4–10.8)

## 2023-05-18 ENCOUNTER — TELEPHONE (OUTPATIENT)
Dept: FAMILY MEDICINE CLINIC | Facility: CLINIC | Age: 48
End: 2023-05-18

## 2023-05-18 NOTE — TELEPHONE ENCOUNTER
----- Message from Mu Morocho MD sent at 5/17/2023 11:20 AM EDT -----  Patient needs follow-up appointment, has  Abnormal blood work

## 2023-05-25 ENCOUNTER — OFFICE VISIT (OUTPATIENT)
Dept: FAMILY MEDICINE CLINIC | Facility: CLINIC | Age: 48
End: 2023-05-25

## 2023-05-25 VITALS
HEART RATE: 78 BPM | WEIGHT: 144 LBS | SYSTOLIC BLOOD PRESSURE: 120 MMHG | RESPIRATION RATE: 16 BRPM | OXYGEN SATURATION: 98 % | DIASTOLIC BLOOD PRESSURE: 72 MMHG | BODY MASS INDEX: 26.5 KG/M2 | HEIGHT: 62 IN

## 2023-05-25 DIAGNOSIS — K59.00 CONSTIPATION, UNSPECIFIED CONSTIPATION TYPE: ICD-10-CM

## 2023-05-25 DIAGNOSIS — E87.5 HYPERKALEMIA: ICD-10-CM

## 2023-05-25 DIAGNOSIS — E11.9 TYPE 2 DIABETES MELLITUS WITHOUT COMPLICATION, WITHOUT LONG-TERM CURRENT USE OF INSULIN (HCC): Primary | ICD-10-CM

## 2023-05-25 DIAGNOSIS — M89.8X9 BONE PAIN: ICD-10-CM

## 2023-05-25 DIAGNOSIS — E78.2 MIXED HYPERLIPIDEMIA: ICD-10-CM

## 2023-05-25 DIAGNOSIS — E55.9 VITAMIN D DEFICIENCY: ICD-10-CM

## 2023-05-25 DIAGNOSIS — D50.9 IRON DEFICIENCY ANEMIA, UNSPECIFIED IRON DEFICIENCY ANEMIA TYPE: ICD-10-CM

## 2023-05-25 PROBLEM — Z23 NEED FOR TDAP VACCINATION: Status: RESOLVED | Noted: 2020-01-20 | Resolved: 2023-05-25

## 2023-05-25 LAB
LEFT EYE DIABETIC RETINOPATHY: ABNORMAL
LEFT EYE IMAGE QUALITY: ABNORMAL
LEFT EYE MACULAR EDEMA: ABNORMAL
LEFT EYE OTHER RETINOPATHY: ABNORMAL
RIGHT EYE DIABETIC RETINOPATHY: ABNORMAL
RIGHT EYE IMAGE QUALITY: ABNORMAL
RIGHT EYE MACULAR EDEMA: ABNORMAL
RIGHT EYE OTHER RETINOPATHY: ABNORMAL
SEVERITY (EYE EXAM): ABNORMAL

## 2023-05-25 RX ORDER — PLECANATIDE 3 MG/1
1 TABLET ORAL DAILY
Qty: 90 TABLET | Refills: 1 | Status: SHIPPED | OUTPATIENT
Start: 2023-05-25

## 2023-05-25 NOTE — PROGRESS NOTES
Name: Santa Ontiveros      : 1975      MRN: [id number]  Encounter Provider: Maya House MD  Encounter Date: 2023   Encounter department: [facility] [address]     1  Type 2 diabetes mellitus without complication, without long-term current use of insulin ([facility])  Assessment & Plan:  She is not taking her medications her blood sugar is going up, discussed about low-carb low-fat diet and start doing compliance with medication as the risk of uncontrolled diabetes is high    Lab Results   Component Value Date    HGBA1C 7 5 (H) 2023       Orders:  -     IRIS Diabetic eye exam  -     Diabetic foot exam; Future  -     Albumin / creatinine urine ratio; Future  -     Hemoglobin A1C; Future; Expected date: 2023  -     Comprehensive metabolic panel; Future; Expected date: 2023  -     CBC and differential; Future; Expected date: 2023  -     Lipid Panel with Direct LDL reflex; Future; Expected date: 2023  -     metFORMIN (GLUCOPHAGE) 500 mg tablet; Take 1 tablet (500 mg total) by mouth 2 (two) times a day with meals  -     sitaGLIPtin (JANUVIA) 25 mg tablet; Take 1 tablet (25 mg total) by mouth daily    2  Mixed hyperlipidemia  Assessment & Plan:  She is also regular with taking simvastatin and her cholesterol is going up advised to take medicine every day and recheck labs in 4 months    Orders:  -     Comprehensive metabolic panel; Future; Expected date: 2023  -     CBC and differential; Future; Expected date: 2023  -     Lipid Panel with Direct LDL reflex; Future; Expected date: 2023    3  Constipation, unspecified constipation type  -     Plecanatide (Trulance) 3 MG TABS; Take 1 tablet by mouth daily    4  Bone pain  Assessment & Plan:  Advised to take vitamin D daily 1000 units      5  Hyperkalemia  Assessment & Plan:  Advised about low potassium diet and drink more water      6   Iron deficiency anemia, unspecified iron deficiency anemia type  Assessment & Plan:  She has a long history of anemia if she does not take iron hemoglobin goes low, she recently restarted iron as her hemoglobin has gone down slightly      7  Vitamin D deficiency  Assessment & Plan:  Advised to take vitamin D 1000 unit daily      BMI Counseling: Body mass index is 26 34 kg/m²  The BMI is above normal  Nutrition recommendations include decreasing portion sizes, decreasing fast food intake, limiting drinks that contain sugar, moderation in carbohydrate intake and reducing intake of cholesterol  Exercise recommendations include exercising 3-5 times per week  Rationale for BMI follow-up plan is due to patient being overweight or obese  Depression Screening and Follow-up Plan: Patient was screened for depression during today's encounter  They screened negative with a PHQ-2 score of 0  Subjective     She is here for follow-up on diabetes she says she regularly takes metformin and she is not taking Januvia and she is concerned about side effects, she is trying to do some natural therapies better blood sugar remains high,  He is also not taking simvastatin every day and she says she was eating poorly  She has history of hyperkalemia admittedly, she says she eats nuts and that has high potassium, and she does not drink much water, denies any headache or chest pain denies any shortness of breath, she has constipation history and she says Trulance works good and she needs a refill    Review of Systems   Constitutional: Negative for activity change, appetite change, chills, fatigue, fever and unexpected weight change  HENT: Negative for congestion, ear discharge, ear pain, nosebleeds, postnasal drip, rhinorrhea, sinus pressure, sneezing, sore throat, trouble swallowing and voice change  Eyes: Negative for photophobia, pain, discharge, redness and itching  Respiratory: Negative for cough, chest tightness, shortness of breath and wheezing      Cardiovascular: Negative for chest pain, palpitations and leg swelling  Gastrointestinal: Negative for abdominal pain, constipation, diarrhea, nausea and vomiting  Endocrine: Negative for polyuria  Genitourinary: Negative for dysuria, frequency and urgency  Musculoskeletal: Negative for arthralgias, back pain, myalgias and neck pain  Skin: Negative for color change, pallor and rash  Allergic/Immunologic: Negative for environmental allergies and food allergies  Neurological: Negative for dizziness, weakness, light-headedness and headaches  Hematological: Negative for adenopathy  Does not bruise/bleed easily  Psychiatric/Behavioral: Negative for behavioral problems  The patient is not nervous/anxious          Past Medical History:   Diagnosis Date   • Abdominal pain     right lower [id number] in 1/2020   • Diabetes mellitus ([facility] [address] )    • Iron deficiency anemia    • Lab test positive for detection of COVID-19 virus 11/28/2020     Past Surgical History:   Procedure Laterality Date   • COLONOSCOPY     • REDUCTION MAMMAPLASTY Bilateral     11/27/19   • TUBAL LIGATION      last assessed 7/10/17   • UPPER GASTROINTESTINAL ENDOSCOPY       Family History   Problem Relation Age of Onset   • Diabetes Mother    • Hypertension Mother    • Heart disease Father         myocardial infarction   • Diabetes Sister    • Diabetes Maternal Grandmother    • Hypertension Maternal Grandmother    • No Known Problems Daughter    • No Known Problems Maternal Grandfather    • No Known Problems Paternal Grandmother    • No Known Problems Paternal Grandfather    • No Known Problems Sister    • No Known Problems Sister    • Leukemia Brother    • No Known Problems Brother    • No Known Problems Brother    • No Known Problems Brother    • No Known Problems Daughter    • No Known Problems Son    • No Known Problems Son    • No Known Problems Maternal Aunt    • No Known Problems Maternal Aunt    • No Known Problems Maternal Aunt    • No Known Problems Paternal Aunt    • No Known Problems Paternal Aunt    • No Known Problems Paternal Aunt    • No Known Problems Paternal Uncle    • No Known Problems Maternal Uncle    • No Known Problems Maternal Uncle    • No Known Problems Maternal Uncle    • Breast cancer Cousin 28        Maternal   • No Known Problems Brother    • Leukemia Other    • Substance Abuse Neg Hx    • Mental illness Neg Hx      Social History     Socioeconomic History   • Marital status: /Civil Union     Spouse name: None   • Number of children: None   • Years of education: None   • Highest education level: None   Occupational History   • None   Tobacco Use   • Smoking status: Never   • Smokeless tobacco: Never   Substance and Sexual Activity   • Alcohol use: No   • Drug use: No   • Sexual activity: Yes     Partners: Male     Birth control/protection: Female Sterilization   Other Topics Concern   • None   Social History Narrative   • None     Social Determinants of Health     Financial Resource Strain: Not on file   Food Insecurity: Not on file   Transportation Needs: Not on file   Physical Activity: Not on file   Stress: Not on file   Social Connections: Not on file   Intimate Partner Violence: Not on file   Housing Stability: Not on file     Current Outpatient Medications on File Prior to Visit   Medication Sig   • multivitamin (THERAGRAN) TABS Take 1 tablet by mouth daily   • simvastatin (ZOCOR) 10 mg tablet Take 1 tablet (10 mg total) by mouth daily at bedtime   • [DISCONTINUED] metFORMIN (GLUCOPHAGE) 500 mg tablet Take 1 tablet (500 mg total) by mouth 2  times a day with meals   • [DISCONTINUED] Plecanatide (Trulance) 3 MG TABS Take 1 tablet by mouth daily (Patient not taking: Reported on 7/28/2022)   • [DISCONTINUED] sitaGLIPtin (JANUVIA) 25 mg tablet Take 1 tablet (25 mg total) by mouth daily (Patient not taking: Reported on 5/25/2023)     Allergies   Allergen Reactions   • Pollen Extract      Immunization History   Administered "Date(s) Administered   • COVID-19 PFIZER VACCINE 0 3 ML IM 04/30/2021, 05/21/2021   • Meningococcal MCV4P 10/30/2009   • Pneumococcal Polysaccharide PPV23 02/14/2014   • Tdap 01/20/2020       Objective     /72   Pulse 78   Resp 16   Ht 5' 2\" (1 575 m)   Wt 65 3 kg (144 lb)   SpO2 98%   BMI 26 34 kg/m²     Physical Exam  Vitals and nursing note reviewed  Constitutional:       Appearance: She is well-developed  HENT:      Head: Normocephalic and atraumatic  Eyes:      General: No scleral icterus  Right eye: No discharge  Left eye: No discharge  Conjunctiva/sclera: Conjunctivae normal       Pupils: Pupils are equal, round, and reactive to light  Neck:      Thyroid: No thyromegaly  Trachea: No tracheal deviation  Cardiovascular:      Rate and Rhythm: Normal rate and regular rhythm  Pulses: no weak pulses          Dorsalis pedis pulses are 2+ on the right side and 2+ on the left side  Heart sounds: Normal heart sounds  No murmur heard  Pulmonary:      Effort: Pulmonary effort is normal  No respiratory distress  Breath sounds: Normal breath sounds  No wheezing or rales  Abdominal:      General: Bowel sounds are normal  There is no distension  Palpations: Abdomen is soft  There is no mass  Tenderness: There is no abdominal tenderness  There is no rebound  Musculoskeletal:         General: Normal range of motion  Cervical back: Normal range of motion and neck supple  Feet:      Right foot:      Skin integrity: No ulcer, skin breakdown, erythema, warmth, callus or dry skin  Left foot:      Skin integrity: No ulcer, skin breakdown, erythema, warmth, callus or dry skin  Lymphadenopathy:      Cervical: No cervical adenopathy  Skin:     General: Skin is warm  Coloration: Skin is not pale  Findings: No erythema or rash  Neurological:      Mental Status: She is alert and oriented to person, place, and time        Cranial Nerves: " No cranial nerve deficit  Deep Tendon Reflexes: Reflexes are normal and symmetric  Psychiatric:         Behavior: Behavior normal          Thought Content: Thought content normal          Judgment: Judgment normal       Diabetic Foot Exam    Patient's shoes and socks removed  Right Foot/Ankle   Right Foot Inspection  Skin Exam: skin normal  Skin not intact, no dry skin, no warmth, no callus, no erythema, no maceration, no abnormal color, no pre-ulcer, no ulcer and no callus  Toe Exam: ROM and strength within normal limits  Sensory   Vibration: intact  Proprioception: intact  Monofilament testing: intact    Vascular  Capillary refills: < 3 seconds  The right DP pulse is 2+  Left Foot/Ankle  Left Foot Inspection  Skin Exam: skin normal  Skin not intact, no dry skin, no warmth, no erythema, no maceration, normal color, no pre-ulcer, no ulcer and no callus  Toe Exam: ROM and strength within normal limits  Sensory   Vibration: intact  Proprioception: intact  Monofilament testing: intact    Vascular  Capillary refills: < 3 seconds  The left DP pulse is 2+       Assign Risk Category  No deformity present  No loss of protective sensation  No weak pulses  Risk: 0    Nghia Frazier MD

## 2023-05-25 NOTE — ASSESSMENT & PLAN NOTE
She is not taking her medications her blood sugar is going up, discussed about low-carb low-fat diet and start doing compliance with medication as the risk of uncontrolled diabetes is high    Lab Results   Component Value Date    HGBA1C 7 5 (H) 05/16/2023

## 2023-05-25 NOTE — ASSESSMENT & PLAN NOTE
She is also regular with taking simvastatin and her cholesterol is going up advised to take medicine every day and recheck labs in 4 months

## 2023-05-25 NOTE — ASSESSMENT & PLAN NOTE
She has a long history of anemia if she does not take iron hemoglobin goes low, she recently restarted iron as her hemoglobin has gone down slightly

## 2023-05-30 NOTE — RESULT ENCOUNTER NOTE
Please inform the patient that left eye has suspected high pressure and she should see the ophthalmologist

## 2023-05-31 ENCOUNTER — TELEPHONE (OUTPATIENT)
Dept: FAMILY MEDICINE CLINIC | Facility: CLINIC | Age: 48
End: 2023-05-31

## 2023-05-31 NOTE — TELEPHONE ENCOUNTER
----- Message from Michelle Warren MD sent at 5/30/2023  9:30 AM EDT -----  Please inform the patient that left eye has suspected high pressure and she should see the ophthalmologist

## 2023-08-29 ENCOUNTER — OFFICE VISIT (OUTPATIENT)
Dept: FAMILY MEDICINE CLINIC | Facility: CLINIC | Age: 48
End: 2023-08-29
Payer: COMMERCIAL

## 2023-08-29 VITALS
HEIGHT: 62 IN | SYSTOLIC BLOOD PRESSURE: 110 MMHG | RESPIRATION RATE: 16 BRPM | BODY MASS INDEX: 26.5 KG/M2 | OXYGEN SATURATION: 98 % | WEIGHT: 144 LBS | HEART RATE: 84 BPM | DIASTOLIC BLOOD PRESSURE: 70 MMHG

## 2023-08-29 DIAGNOSIS — E11.9 TYPE 2 DIABETES MELLITUS WITHOUT COMPLICATION, WITHOUT LONG-TERM CURRENT USE OF INSULIN (HCC): Primary | ICD-10-CM

## 2023-08-29 PROCEDURE — 99213 OFFICE O/P EST LOW 20 MIN: CPT | Performed by: FAMILY MEDICINE

## 2023-08-29 NOTE — PROGRESS NOTES
Name: Gabriele Booker      : 1975      MRN: [id number]  Encounter Provider: Dale Hernandez MD  Encounter Date: 2023   Encounter department: [address] [place] [address]. Type 2 diabetes mellitus without complication, without long-term current use of insulin (Formerly [facility])  Assessment & Plan:    Lab Results   Component Value Date    HGBA1C 7.5 (H) 2023   stop januvia and start ozempic started dose , and f/u 1 month, side effect of the medicine discussed with her, she might feel nausea in the start of the treatment    Orders:  -     semaglutide, 0.25 or 0.5 mg/dose, (Ozempic, 0.25 or 0.5 MG/DOSE,) 2 mg/3 mL injection pen; Inject 0.25 mg under the skin every 7 days for 28 days, THEN inject 0.5 mg under the skin every 7 days           Subjective     She is diabetic and she wants to try the Ozempic injectable as she is diabetic and diabetes is not very well controlled she is on metformin and Januvia her next blood work is due in September    Review of Systems   Constitutional: Negative. HENT: Negative. Eyes: Negative. Respiratory: Negative. Cardiovascular: Negative. Gastrointestinal: Negative.         Past Medical History:   Diagnosis Date   • Abdominal pain     right lower [id number] in 2020   • Diabetes mellitus ([address])    • Iron deficiency anemia    • Lab test positive for detection of COVID-19 virus 2020     Past Surgical History:   Procedure Laterality Date   • COLONOSCOPY     • REDUCTION MAMMAPLASTY Bilateral     19   • TUBAL LIGATION      last assessed 7/10/17   • UPPER GASTROINTESTINAL ENDOSCOPY       Family History   Problem Relation Age of Onset   • Diabetes Mother    • Hypertension Mother    • Heart disease Father         myocardial infarction   • Diabetes Sister    • Diabetes Maternal Grandmother    • Hypertension Maternal Grandmother    • No Known Problems Daughter    • No Known Problems Maternal Grandfather    • No Known Problems Paternal Grandmother    • No Known Problems Paternal Grandfather    • No Known Problems Sister    • No Known Problems Sister    • Leukemia Brother    • No Known Problems Brother    • No Known Problems Brother    • No Known Problems Brother    • No Known Problems Daughter    • No Known Problems Son    • No Known Problems Son    • No Known Problems Maternal Aunt    • No Known Problems Maternal Aunt    • No Known Problems Maternal Aunt    • No Known Problems Paternal Aunt    • No Known Problems Paternal Aunt    • No Known Problems Paternal Aunt    • No Known Problems Paternal Uncle    • No Known Problems Maternal Uncle    • No Known Problems Maternal Uncle    • No Known Problems Maternal Uncle    • Breast cancer Cousin 28        Maternal   • No Known Problems Brother    • Leukemia Other    • Substance Abuse Neg Hx    • Mental illness Neg Hx      Social History     Socioeconomic History   • Marital status: /Civil Union     Spouse name: None   • Number of children: None   • Years of education: None   • Highest education level: None   Occupational History   • None   Tobacco Use   • Smoking status: Never   • Smokeless tobacco: Never   Substance and Sexual Activity   • Alcohol use: No   • Drug use: No   • Sexual activity: Yes     Partners: Male     Birth control/protection: Female Sterilization   Other Topics Concern   • None   Social History Narrative   • None     Social Determinants of Health     Financial Resource Strain: Not on file   Food Insecurity: Not on file   Transportation Needs: Not on file   Physical Activity: Not on file   Stress: Not on file   Social Connections: Not on file   Intimate Partner Violence: Not on file   Housing Stability: Not on file     Current Outpatient Medications on File Prior to Visit   Medication Sig   • metFORMIN (GLUCOPHAGE) 500 mg tablet Take 1 tablet (500 mg total) by mouth 2 (two) times a day with meals   • multivitamin (THERAGRAN) TABS Take 1 tablet by mouth daily • simvastatin (ZOCOR) 10 mg tablet Take 1 tablet (10 mg total) by mouth daily at bedtime   • [DISCONTINUED] sitaGLIPtin (JANUVIA) 25 mg tablet Take 1 tablet (25 mg total) by mouth daily   • Plecanatide (Trulance) 3 MG TABS Take 1 tablet by mouth daily (Patient not taking: Reported on 8/29/2023)     Allergies   Allergen Reactions   • Pollen Extract      Immunization History   Administered Date(s) Administered   • COVID-19 PFIZER VACCINE 0.3 ML IM 04/30/2021, 05/21/2021   • Meningococcal MCV4P 10/30/2009   • Pneumococcal Polysaccharide PPV23 02/14/2014   • Tdap 01/20/2020       Objective     /70   Pulse 84   Resp 16   Ht 5' 2" (1.575 m)   Wt 65.3 kg (144 lb)   SpO2 98%   BMI 26.34 kg/m²     Physical Exam  Vitals and nursing note reviewed. Constitutional:       Appearance: Normal appearance. She is well-developed. Eyes:      Extraocular Movements: Extraocular movements intact. Neck:      Thyroid: No thyromegaly. Cardiovascular:      Rate and Rhythm: Normal rate and regular rhythm. Heart sounds: Normal heart sounds. No murmur heard. Pulmonary:      Effort: Pulmonary effort is normal.      Breath sounds: Normal breath sounds. No wheezing or rales. Musculoskeletal:      Cervical back: Normal range of motion and neck supple. Skin:     Findings: No erythema or rash. Neurological:      Mental Status: She is alert.    Psychiatric:         Mood and Affect: Mood normal.       Jose Raul Renae MD

## 2023-08-29 NOTE — ASSESSMENT & PLAN NOTE
Lab Results   Component Value Date    HGBA1C 7.5 (H) 05/16/2023   stop januvia and start ozempic started dose , and f/u 1 month, side effect of the medicine discussed with her, she might feel nausea in the start of the treatment

## 2023-11-17 NOTE — ASSESSMENT & PLAN NOTE
, intermittently she feels discomfort in the left shoulder and left upper arm, no chest pain    She has discomfort on internal and external rotation of the shoulder, discussed with her about rotator cuff tendinitis, she can do stretching and avoid heavy lifting, and if it continues she can see the orthopedic
Lab Results   Component Value Date    [address] (H) 03/01/2022   Her cholesterol is going up, discussed with her she is diabetic she needs to be on statin and start her on simvastatin 10 mg
Lab Results   Component Value Date    HGBA1C 6 5 (H) 03/01/2022   She is stable, continue same medication and continue her low carb diet
She has no problem in hearing, advised to avoid Q-tips
She says since she has been taking trulence her constipation is under control
independent

## 2023-11-28 DIAGNOSIS — E11.9 TYPE 2 DIABETES MELLITUS WITHOUT COMPLICATION, WITHOUT LONG-TERM CURRENT USE OF INSULIN (HCC): ICD-10-CM

## 2023-11-28 RX ORDER — SEMAGLUTIDE 0.68 MG/ML
INJECTION, SOLUTION SUBCUTANEOUS
Qty: 9 ML | Refills: 0 | Status: SHIPPED | OUTPATIENT
Start: 2023-11-28

## 2024-01-22 ENCOUNTER — TELEPHONE (OUTPATIENT)
Age: 49
End: 2024-01-22

## 2024-01-25 ENCOUNTER — OFFICE VISIT (OUTPATIENT)
Dept: FAMILY MEDICINE CLINIC | Facility: CLINIC | Age: 49
End: 2024-01-25
Payer: COMMERCIAL

## 2024-01-25 VITALS
HEIGHT: 62 IN | WEIGHT: 132 LBS | RESPIRATION RATE: 16 BRPM | BODY MASS INDEX: 24.29 KG/M2 | DIASTOLIC BLOOD PRESSURE: 70 MMHG | OXYGEN SATURATION: 97 % | HEART RATE: 91 BPM | SYSTOLIC BLOOD PRESSURE: 110 MMHG

## 2024-01-25 DIAGNOSIS — D50.9 IRON DEFICIENCY ANEMIA, UNSPECIFIED IRON DEFICIENCY ANEMIA TYPE: ICD-10-CM

## 2024-01-25 DIAGNOSIS — Z11.59 NEED FOR HEPATITIS C SCREENING TEST: ICD-10-CM

## 2024-01-25 DIAGNOSIS — E11.9 TYPE 2 DIABETES MELLITUS WITHOUT COMPLICATION, WITHOUT LONG-TERM CURRENT USE OF INSULIN (HCC): Primary | ICD-10-CM

## 2024-01-25 DIAGNOSIS — R14.0 BLOATED ABDOMEN: ICD-10-CM

## 2024-01-25 DIAGNOSIS — Z12.31 ENCOUNTER FOR SCREENING MAMMOGRAM FOR BREAST CANCER: ICD-10-CM

## 2024-01-25 DIAGNOSIS — K59.00 CONSTIPATION, UNSPECIFIED CONSTIPATION TYPE: ICD-10-CM

## 2024-01-25 PROBLEM — E87.5 HYPERKALEMIA: Status: RESOLVED | Noted: 2021-05-04 | Resolved: 2024-01-25

## 2024-01-25 LAB
ALBUMIN SERPL-MCNC: 3.9 G/DL (ref 3.9–4.9)
ALBUMIN/GLOB SERPL: 1.3 {RATIO} (ref 1.2–2.2)
ALP SERPL-CCNC: 61 IU/L (ref 44–121)
ALT SERPL-CCNC: 7 IU/L (ref 0–32)
AST SERPL-CCNC: 10 IU/L (ref 0–40)
BASOPHILS # BLD AUTO: 0 X10E3/UL (ref 0–0.2)
BASOPHILS NFR BLD AUTO: 1 %
BILIRUB SERPL-MCNC: 0.3 MG/DL (ref 0–1.2)
BUN SERPL-MCNC: 11 MG/DL (ref 6–24)
BUN/CREAT SERPL: 20 (ref 9–23)
CALCIUM SERPL-MCNC: 9 MG/DL (ref 8.7–10.2)
CHLORIDE SERPL-SCNC: 103 MMOL/L (ref 96–106)
CHOLEST SERPL-MCNC: 196 MG/DL (ref 100–199)
CO2 SERPL-SCNC: 22 MMOL/L (ref 20–29)
CREAT SERPL-MCNC: 0.56 MG/DL (ref 0.57–1)
EGFR: 111 ML/MIN/1.73
EOSINOPHIL # BLD AUTO: 0.2 X10E3/UL (ref 0–0.4)
EOSINOPHIL NFR BLD AUTO: 2 %
ERYTHROCYTE [DISTWIDTH] IN BLOOD BY AUTOMATED COUNT: 13.5 % (ref 11.7–15.4)
GLOBULIN SER-MCNC: 2.9 G/DL (ref 1.5–4.5)
GLUCOSE SERPL-MCNC: 100 MG/DL (ref 70–99)
HBA1C MFR BLD: 6.6 % (ref 4.8–5.6)
HCT VFR BLD AUTO: 31 % (ref 34–46.6)
HDLC SERPL-MCNC: 60 MG/DL
HGB BLD-MCNC: 10.1 G/DL (ref 11.1–15.9)
IMM GRANULOCYTES # BLD: 0 X10E3/UL (ref 0–0.1)
IMM GRANULOCYTES NFR BLD: 0 %
LDLC SERPL CALC-MCNC: 119 MG/DL (ref 0–99)
LYMPHOCYTES # BLD AUTO: 2.6 X10E3/UL (ref 0.7–3.1)
LYMPHOCYTES NFR BLD AUTO: 32 %
MCH RBC QN AUTO: 25 PG (ref 26.6–33)
MCHC RBC AUTO-ENTMCNC: 32.6 G/DL (ref 31.5–35.7)
MCV RBC AUTO: 77 FL (ref 79–97)
MONOCYTES # BLD AUTO: 0.5 X10E3/UL (ref 0.1–0.9)
MONOCYTES NFR BLD AUTO: 6 %
NEUTROPHILS # BLD AUTO: 4.7 X10E3/UL (ref 1.4–7)
NEUTROPHILS NFR BLD AUTO: 59 %
PLATELET # BLD AUTO: 473 X10E3/UL (ref 150–450)
POTASSIUM SERPL-SCNC: 4.3 MMOL/L (ref 3.5–5.2)
PROT SERPL-MCNC: 6.8 G/DL (ref 6–8.5)
RBC # BLD AUTO: 4.04 X10E6/UL (ref 3.77–5.28)
SODIUM SERPL-SCNC: 138 MMOL/L (ref 134–144)
TRIGL SERPL-MCNC: 92 MG/DL (ref 0–149)
WBC # BLD AUTO: 8 X10E3/UL (ref 3.4–10.8)

## 2024-01-25 PROCEDURE — 99214 OFFICE O/P EST MOD 30 MIN: CPT | Performed by: FAMILY MEDICINE

## 2024-01-25 RX ORDER — QUINIDINE GLUCONATE 324 MG
240 TABLET, EXTENDED RELEASE ORAL DAILY
Qty: 90 TABLET | Refills: 3 | Status: SHIPPED | OUTPATIENT
Start: 2024-01-25

## 2024-01-25 RX ORDER — DOCUSATE SODIUM 100 MG/1
100 CAPSULE, LIQUID FILLED ORAL DAILY
Qty: 90 CAPSULE | Refills: 2 | Status: SHIPPED | OUTPATIENT
Start: 2024-01-25

## 2024-01-25 NOTE — ASSESSMENT & PLAN NOTE
Significant improvement since Ozempic, discussed with her continue Ozempic and metformin she missed metformin 1 dose most of the time continue same medications and recheck labs in 3 months, also discussed with her that Ozempic can cause bloating and stomach and stop the motility and if she want to try to stop it but she said now she has stomach issues before Ozempic  Lab Results   Component Value Date    HGBA1C 6.6 (H) 01/24/2024

## 2024-01-25 NOTE — PROGRESS NOTES
Name: Yesenia Booth      : 1975      MRN: [id number]  Encounter Provider: Liliam Goode MD  Encounter Date: 2024   Encounter department: [facility]    Assessment & Plan     1. Type 2 diabetes mellitus without complication, without long-term current use of insulin (HCC)  Assessment & Plan:  Significant improvement since Ozempic, discussed with her continue Ozempic and metformin she missed metformin 1 dose most of the time continue same medications and recheck labs in 3 months, also discussed with her that Ozempic can cause bloating and stomach and stop the motility and if she want to try to stop it but she said now she has stomach issues before Ozempic  Lab Results   Component Value Date    HGBA1C 6.6 (H) 2024     Orders:  -     Albumin / creatinine urine ratio; Future; Expected date: 2024  -     Basic metabolic panel; Future; Expected date: 2024  -     Hemoglobin A1C; Future; Expected date: 2024  -     CBC and Platelet; Future; Expected date: 2024  -     Lipid Panel with Direct LDL reflex; Future; Expected date: 2024  -     semaglutide, 0.25 or 0.5 mg/dose, (Ozempic, 0.25 or 0.5 MG/DOSE,) 2 mg/3 mL injection pen; Inject 0.75 mL (0.5 mg total) under the skin every 7 days    2. Bloated abdomen  -     Ambulatory Referral to Gastroenterology; Future    3. Iron deficiency anemia, unspecified iron deficiency anemia type  Assessment & Plan:  He has iron deficiency anemia previously documented and she is not currently taking iron she is symptomatic with fatigue and headaches, start the iron daily and Colace to avoid constipation and follow-up in 3 months    Orders:  -     CBC and Platelet; Future; Expected date: 2024  -     Ambulatory Referral to Gastroenterology; Future  -     ferrous gluconate (FERGON) 240 (27 FE) MG tablet; Take 1 tablet (240 mg total) by mouth in the morning    4. Need for hepatitis C screening test  -     Hepatitis C antibody;  Future; Expected date: 04/25/2024    5. Encounter for screening mammogram for breast cancer  -     Mammo screening bilateral w 3d & cad; Future; Expected date: 01/25/2024    6. Constipation, unspecified constipation type  Assessment & Plan:  With iron constipation can increase she will take stool softener daily    Orders:  -     docusate sodium (COLACE) 100 mg capsule; Take 1 capsule (100 mg total) by mouth in the morning           Subjective     She is diabetic on Ozempic she lost weight she feels bloated in her abdomen she says this has long history and previously seen by gastroenterologist and want to be rechecked, she says it is not after the Ozempic.  She has been feeling more tired and intermittent headaches, she has history of anemia she gets a monthly menses,, currently not taking any iron or supplements        Review of Systems   Constitutional:  Positive for fatigue. Negative for activity change, appetite change, chills, fever and unexpected weight change.   HENT:  Negative for congestion, ear discharge, ear pain, nosebleeds, postnasal drip, rhinorrhea, sinus pressure, sneezing, sore throat, trouble swallowing and voice change.    Eyes:  Negative for photophobia, pain, discharge, redness and itching.   Respiratory:  Negative for cough, chest tightness, shortness of breath and wheezing.    Cardiovascular:  Negative for chest pain, palpitations and leg swelling.   Gastrointestinal:  Negative for abdominal pain, constipation, diarrhea, nausea and vomiting.   Endocrine: Negative for polyuria.   Genitourinary:  Negative for dysuria, frequency and urgency.   Musculoskeletal:  Negative for arthralgias, back pain, myalgias and neck pain.   Skin:  Negative for color change, pallor and rash.   Allergic/Immunologic: Negative for environmental allergies and food allergies.   Neurological:  Positive for headaches. Negative for dizziness, weakness and light-headedness.   Hematological:  Negative for adenopathy. Does not  bruise/bleed easily.   Psychiatric/Behavioral:  Negative for behavioral problems. The patient is not nervous/anxious.        Past Medical History:   Diagnosis Date   • Abdominal pain     right lower [id number] in 1/2020   • Diabetes mellitus (HCC)    • Iron deficiency anemia    • Lab test positive for detection of COVID-19 virus 11/28/2020     Past Surgical History:   Procedure Laterality Date   • COLONOSCOPY     • REDUCTION MAMMAPLASTY Bilateral     11/27/19   • TUBAL LIGATION      last assessed 7/10/17   • UPPER GASTROINTESTINAL ENDOSCOPY       Family History   Problem Relation Age of Onset   • Diabetes Mother    • Hypertension Mother    • Heart disease Father         myocardial infarction   • Diabetes Sister    • Diabetes Maternal Grandmother    • Hypertension Maternal Grandmother    • No Known Problems Daughter    • No Known Problems Maternal Grandfather    • No Known Problems Paternal Grandmother    • No Known Problems Paternal Grandfather    • No Known Problems Sister    • No Known Problems Sister    • Leukemia Brother    • No Known Problems Brother    • No Known Problems Brother    • No Known Problems Brother    • No Known Problems Daughter    • No Known Problems Son    • No Known Problems Son    • No Known Problems Maternal Aunt    • No Known Problems Maternal Aunt    • No Known Problems Maternal Aunt    • No Known Problems Paternal Aunt    • No Known Problems Paternal Aunt    • No Known Problems Paternal Aunt    • No Known Problems Paternal Uncle    • No Known Problems Maternal Uncle    • No Known Problems Maternal Uncle    • No Known Problems Maternal Uncle    • Breast cancer Cousin 35        Maternal   • No Known Problems Brother    • Leukemia Other    • Substance Abuse Neg Hx    • Mental illness Neg Hx      Social History     Socioeconomic History   • Marital status: /Civil Union     Spouse name: None   • Number of children: None   • Years of education: None   • Highest  "education level: None   Occupational History   • None   Tobacco Use   • Smoking status: Never   • Smokeless tobacco: Never   Substance and Sexual Activity   • Alcohol use: No   • Drug use: No   • Sexual activity: Yes     Partners: Male     Birth control/protection: Female Sterilization   Other Topics Concern   • None   Social History Narrative   • None     Social Determinants of Health     Financial Resource Strain: Not on file   Food Insecurity: Not on file   Transportation Needs: Not on file   Physical Activity: Not on file   Stress: Not on file   Social Connections: Not on file   Intimate Partner Violence: Not on file   Housing Stability: Not on file     Current Outpatient Medications on File Prior to Visit   Medication Sig   • metFORMIN (GLUCOPHAGE) 500 mg tablet Take 1 tablet (500 mg total) by mouth 2 (two) times a day with meals   • multivitamin (THERAGRAN) TABS Take 1 tablet by mouth daily   • Plecanatide (Trulance) 3 MG TABS Take 1 tablet by mouth daily (Patient not taking: Reported on 8/29/2023)   • simvastatin (ZOCOR) 10 mg tablet Take 1 tablet (10 mg total) by mouth daily at bedtime   • [DISCONTINUED] Ozempic, 0.25 or 0.5 MG/DOSE, 2 MG/3ML injection pen Inject 0.25 mg under the skin every 7 days for 28 days, THEN inject 0.5 mg under the skin every 7 days.     Allergies   Allergen Reactions   • Pollen Extract      Immunization History   Administered Date(s) Administered   • COVID-19 PFIZER VACCINE 0.3 ML IM 04/30/2021, 05/21/2021   • Meningococcal MCV4P 10/30/2009   • Pneumococcal Polysaccharide PPV23 02/14/2014   • Tdap 01/20/2020       Objective     /70   Pulse 91   Resp 16   Ht 5' 2\" (1.575 m)   Wt 59.9 kg (132 lb)   SpO2 97%   BMI 24.14 kg/m²     Physical Exam  Vitals and nursing note reviewed.   Constitutional:       Appearance: Normal appearance. She is well-developed. She is not ill-appearing.   Eyes:      Extraocular Movements: Extraocular movements intact.   Neck:      Thyroid: No " thyromegaly.   Cardiovascular:      Rate and Rhythm: Normal rate and regular rhythm.      Heart sounds: Normal heart sounds. No murmur heard.  Pulmonary:      Effort: Pulmonary effort is normal.      Breath sounds: Normal breath sounds. No wheezing or rales.   Abdominal:      General: Abdomen is flat. There is no distension.   Musculoskeletal:      Cervical back: Normal range of motion and neck supple.      Right lower leg: No edema.      Left lower leg: No edema.   Skin:     Findings: No erythema or rash.   Neurological:      Mental Status: She is alert.       Liliam Goode MD

## 2024-01-25 NOTE — ASSESSMENT & PLAN NOTE
He has iron deficiency anemia previously documented and she is not currently taking iron she is symptomatic with fatigue and headaches, start the iron daily and Colace to avoid constipation and follow-up in 3 months

## 2024-02-19 ENCOUNTER — OFFICE VISIT (OUTPATIENT)
Dept: FAMILY MEDICINE CLINIC | Facility: CLINIC | Age: 49
End: 2024-02-19
Payer: COMMERCIAL

## 2024-02-19 VITALS
HEIGHT: 62 IN | SYSTOLIC BLOOD PRESSURE: 120 MMHG | RESPIRATION RATE: 16 BRPM | BODY MASS INDEX: 24.29 KG/M2 | DIASTOLIC BLOOD PRESSURE: 68 MMHG | OXYGEN SATURATION: 99 % | WEIGHT: 132 LBS | HEART RATE: 92 BPM

## 2024-02-19 DIAGNOSIS — M77.8 SHOULDER TENDINITIS, RIGHT: Primary | ICD-10-CM

## 2024-02-19 DIAGNOSIS — M72.2 PLANTAR FASCIITIS, RIGHT: ICD-10-CM

## 2024-02-19 PROCEDURE — 99214 OFFICE O/P EST MOD 30 MIN: CPT | Performed by: FAMILY MEDICINE

## 2024-02-19 RX ORDER — DICLOFENAC POTASSIUM 50 MG/1
50 TABLET, FILM COATED ORAL 3 TIMES DAILY
Qty: 21 TABLET | Refills: 0 | Status: SHIPPED | OUTPATIENT
Start: 2024-02-19 | End: 2024-02-26

## 2024-02-19 NOTE — PROGRESS NOTES
Name: Yesenia Booth      : 1975      MRN: [id number]  Encounter Provider: Liliam Goode MD  Encounter Date: 2024   Encounter department: [facility]    Assessment & Plan     1. Shoulder tendinitis, right  Assessment & Plan:  Painful rotation of the right shoulder, will start her on NSAID, and explained to her some stretch exercises and if not better she will go for  the physical therapy    Orders:  -     diclofenac potassium (CATAFLAM) 50 mg tablet; Take 1 tablet (50 mg total) by mouth 3 (three) times a day for 7 days  -     Ambulatory Referral to Physical Therapy; Future    2. Plantar fasciitis, right  Assessment & Plan:  Right foot plantar fasciitis, discussed with her exercises for the right foot and avoid walking barefoot, and wear soft shoes, NSAIDs for 1 week and follow-up if not better, also advised to use warm water soaks with Panda salt 2-3 times a day    Orders:  -     diclofenac potassium (CATAFLAM) 50 mg tablet; Take 1 tablet (50 mg total) by mouth 3 (three) times a day for 7 days         Subjective     Rt foot pain for 2 wks ,, she says when she gets out of the bed and put her right foot on the ground she feels sharp pain on the medial part of the right foot close to the heel and pain gets intermittently worse and better, denies any swelling injury and she mostly walks barefoot at home, no past history of any foot problems  Rt upper arm pain 1 month or more, she feels the pain is on the right upper arm and there is limitation in the movement of the right shoulder, there is no acute injury, she is right-handed person and pain is constant increased with certain movements      Review of Systems   Constitutional: Negative.    HENT: Negative.     Eyes: Negative.    Respiratory: Negative.     Cardiovascular: Negative.    Gastrointestinal: Negative.    Musculoskeletal:  Positive for gait problem.        Right shoulder and upper arm pain and right foot pain   Skin: Negative.         Past Medical History:   Diagnosis Date   • Abdominal pain     right lower [id number] in 1/2020   • Diabetes mellitus (HCC)    • Iron deficiency anemia    • Lab test positive for detection of COVID-19 virus 11/28/2020     Past Surgical History:   Procedure Laterality Date   • COLONOSCOPY     • REDUCTION MAMMAPLASTY Bilateral     11/27/19   • TUBAL LIGATION      last assessed 7/10/17   • UPPER GASTROINTESTINAL ENDOSCOPY       Family History   Problem Relation Age of Onset   • Diabetes Mother    • Hypertension Mother    • Heart disease Father         myocardial infarction   • Diabetes Sister    • Diabetes Maternal Grandmother    • Hypertension Maternal Grandmother    • No Known Problems Daughter    • No Known Problems Maternal Grandfather    • No Known Problems Paternal Grandmother    • No Known Problems Paternal Grandfather    • No Known Problems Sister    • No Known Problems Sister    • Leukemia Brother    • No Known Problems Brother    • No Known Problems Brother    • No Known Problems Brother    • No Known Problems Daughter    • No Known Problems Son    • No Known Problems Son    • No Known Problems Maternal Aunt    • No Known Problems Maternal Aunt    • No Known Problems Maternal Aunt    • No Known Problems Paternal Aunt    • No Known Problems Paternal Aunt    • No Known Problems Paternal Aunt    • No Known Problems Paternal Uncle    • No Known Problems Maternal Uncle    • No Known Problems Maternal Uncle    • No Known Problems Maternal Uncle    • Breast cancer Cousin 35        Maternal   • No Known Problems Brother    • Leukemia Other    • Substance Abuse Neg Hx    • Mental illness Neg Hx      Social History     Socioeconomic History   • Marital status: /Civil Union     Spouse name: None   • Number of children: None   • Years of education: None   • Highest education level: None   Occupational History   • None   Tobacco Use   • Smoking status: Never   • Smokeless tobacco: Never  "  Substance and Sexual Activity   • Alcohol use: No   • Drug use: No   • Sexual activity: Yes     Partners: Male     Birth control/protection: Female Sterilization   Other Topics Concern   • None   Social History Narrative   • None     Social Determinants of Health     Financial Resource Strain: Not on file   Food Insecurity: Not on file   Transportation Needs: Not on file   Physical Activity: Not on file   Stress: Not on file   Social Connections: Not on file   Intimate Partner Violence: Not on file   Housing Stability: Not on file     Current Outpatient Medications on File Prior to Visit   Medication Sig   • docusate sodium (COLACE) 100 mg capsule Take 1 capsule (100 mg total) by mouth in the morning   • ferrous gluconate (FERGON) 240 (27 FE) MG tablet Take 1 tablet (240 mg total) by mouth in the morning   • metFORMIN (GLUCOPHAGE) 500 mg tablet Take 1 tablet (500 mg total) by mouth 2 (two) times a day with meals   • multivitamin (THERAGRAN) TABS Take 1 tablet by mouth daily   • Plecanatide (Trulance) 3 MG TABS Take 1 tablet by mouth daily (Patient not taking: Reported on 8/29/2023)   • semaglutide, 0.25 or 0.5 mg/dose, (Ozempic, 0.25 or 0.5 MG/DOSE,) 2 mg/3 mL injection pen Inject 0.75 mL (0.5 mg total) under the skin every 7 days   • simvastatin (ZOCOR) 10 mg tablet Take 1 tablet (10 mg total) by mouth daily at bedtime     Allergies   Allergen Reactions   • Pollen Extract      Immunization History   Administered Date(s) Administered   • COVID-19 PFIZER VACCINE 0.3 ML IM 04/30/2021, 05/21/2021   • Meningococcal MCV4P 10/30/2009   • Pneumococcal Polysaccharide PPV23 02/14/2014   • Tdap 01/20/2020       Objective     /68   Pulse 92   Resp 16   Ht 5' 2\" (1.575 m)   Wt 59.9 kg (132 lb)   SpO2 99%   BMI 24.14 kg/m²     Physical Exam  Vitals and nursing note reviewed.   Constitutional:       Appearance: Normal appearance. She is well-developed. She is not ill-appearing.   Eyes:      Extraocular Movements: " Extraocular movements intact.   Neck:      Thyroid: No thyromegaly.   Cardiovascular:      Rate and Rhythm: Normal rate and regular rhythm.      Heart sounds: Normal heart sounds. No murmur heard.  Pulmonary:      Effort: Pulmonary effort is normal.      Breath sounds: Normal breath sounds. No wheezing or rales.   Musculoskeletal:      Cervical back: Normal range of motion and neck supple.      Right lower leg: No edema.      Left lower leg: No edema.      Comments: Tenderness on the right heel and medial part of the right foot, no swelling or rash, she has full range of motion at the ankle and the toes    Empty can test is positive, Apley scratch is positive, on the right shoulder   Lymphadenopathy:      Cervical: No cervical adenopathy.   Skin:     Findings: No erythema or rash.   Neurological:      Mental Status: She is alert.       Liliam Goode MD

## 2024-02-19 NOTE — ASSESSMENT & PLAN NOTE
Right foot plantar fasciitis, discussed with her exercises for the right foot and avoid walking barefoot, and wear soft shoes, NSAIDs for 1 week and follow-up if not better, also advised to use warm water soaks with Panda salt 2-3 times a day

## 2024-02-19 NOTE — ASSESSMENT & PLAN NOTE
Painful rotation of the right shoulder, will start her on NSAID, and explained to her some stretch exercises and if not better she will go for  the physical therapy

## 2024-02-21 PROBLEM — Z01.419 GYNECOLOGIC EXAM NORMAL: Status: RESOLVED | Noted: 2019-06-21 | Resolved: 2024-02-21

## 2024-02-21 PROBLEM — Z11.59 NEED FOR HEPATITIS C SCREENING TEST: Status: RESOLVED | Noted: 2024-01-25 | Resolved: 2024-02-21

## 2024-03-07 ENCOUNTER — EVALUATION (OUTPATIENT)
Dept: PHYSICAL THERAPY | Facility: CLINIC | Age: 49
End: 2024-03-07
Payer: COMMERCIAL

## 2024-03-07 DIAGNOSIS — M77.8 SHOULDER TENDINITIS, RIGHT: ICD-10-CM

## 2024-03-07 DIAGNOSIS — G89.29 CHRONIC RIGHT SHOULDER PAIN: ICD-10-CM

## 2024-03-07 DIAGNOSIS — M25.511 CHRONIC RIGHT SHOULDER PAIN: ICD-10-CM

## 2024-03-07 DIAGNOSIS — M75.41 SHOULDER IMPINGEMENT SYNDROME, RIGHT: Primary | ICD-10-CM

## 2024-03-07 PROCEDURE — 97162 PT EVAL MOD COMPLEX 30 MIN: CPT

## 2024-03-07 NOTE — PROGRESS NOTES
PT Evaluation     Today's date: 3/7/2024  Patient name: Yesenia Booth  : 1975  MRN: [id number]  Referring provider: Liliam Goode MD  Dx:   Encounter Diagnosis     ICD-10-CM    1. Shoulder impingement syndrome, right  M75.41       2. Shoulder tendinitis, right  M77.8 Ambulatory Referral to Physical Therapy      3. Chronic right shoulder pain  M25.511     G89.29           Start Time: 1700  Stop Time: 1745  Total time in clinic (min): 45 minutes    Assessment  Assessment details: Yesenia Booth is a pleasant 48 y.o. female who presents with R shoulder pain and impingement.  The patient's greatest concerns are worry over not knowing what's wrong, concern at no signs of improvement, fear of not being able to keep active, and future ill health (and wanting to prevent it).      No further referral appears necessary at this time based upon examination results.    Primary movement impairment diagnosis of poor postural awareness and strength resulting in pathoanatomical symptoms of decreased ROM and limiting her ability to care for self, dress independently, exercise or recreation, go to work, lift, perform household chores, perform yard work, reach overhead, turn to look over shoulder, and wash behind the back.    Primary Impairments:  1) shoulder ROM  2) postural and shoulder strength    Etiologic factors include repetitive poor body mechanics.    Impairments: abnormal coordination, abnormal muscle firing, abnormal muscle tone, abnormal or restricted ROM, abnormal movement, activity intolerance, impaired physical strength, lacks appropriate home exercise program, pain with function, scapular dyskinesis, poor posture  and poor body mechanics    Symptom irritability: moderateUnderstanding of Dx/Px/POC: good   Prognosis: good  Prognosis details: Positive prognostic indicators include positive attitude toward recovery and good understanding of diagnosis and treatment plan options.  Negative prognostic indicators include  chronicity of symptoms, high symptom irritability, minimal changes in pain with movement, and multiple concurrent orthopedic problems.      Goals  Impairment Goals 4-6 weeks  - Decrease pain to 2/10  - Improve shoulder AROM to equal to the unaffected upper extremity  - Increase shoulder strength to 5/5 throughout  - Increase scapular strength to 5/5 throughout    Functional Goals 6-8 weeks  - Return to Prior Level of Function  - Patient will be independent with HEP  - Patient will be able to reach overhead without increased pain/compensation/difficulty  - Patient will be able to reach behind back without increased pain/compensation/difficulty   - Patient will be able to wash hair without increased pain/compensation/difficulty   - Patient will be able to don/doff shirt/jacket without increased pain/compensation/difficulty  - Patient will be able to lift >30 pounds with proper mechanics         Plan  Patient would benefit from: skilled physical therapy  Planned modality interventions: Modalities PRN.  Planned therapy interventions: activity modification, manual therapy, neuromuscular re-education, patient education, therapeutic activities, therapeutic exercise, graded activity, home exercise program, behavior modification, self care, joint mobilization, IASTM, body mechanics training and postural training  Frequency: 2x week  Duration in weeks: 8  Treatment plan discussed with: patient        Subjective Evaluation    History of Present Illness  Mechanism of injury: WORK/SCHOOL: , standing, mixing, chopping, lifting, paperwork, computer work, cooking  HOME LIFE: with family  HOBBIES/EXERCISE: TV, relaxing,   PLOF:  no other noteworthy injuries  HISTORY OF CURRENT INJURY:  Approx 3 months ago, pt worked to move in her bed and when she did, she had a sharp pain that went down her arm.  It is intermittent.  The sharp pain can stay for a few minutes. Pt is R hand dominant  PAIN LOCATION/DESCRIPTORS: lateral aspect  of proximal UE, radiates down radial distribution  AGGRAVATING FACTORS:  reaching overhead, dressing, reaching behind back, lifting, chopping/cutting, mixing,   EASES: nothing to note,   DAY PATTERN: morning can be stiffer due to sleeping on R side  IMAGING:  no imaging done  Mujda denies a new onset of Bladder incontinence, Bowel dysfunction, Dizziness, Dysphagia, Dysarthria, Drop attacks, Diplopia, Nausea, Ataxia, Recent unexplained weight loss, Clumsy or unsteadiness, Constant night pain, History of cancer, Tingling, Numbness, and Saddle anesthesia .  PATIENT GOALS: feel better,     Patient Goals  Patient goals for therapy: decreased pain, increased motion, return to work, return to sport/leisure activities, independence with ADLs/IADLs, increased strength and decreased edema    Pain  Current pain ratin  At best pain ratin  At worst pain rating: 10  Quality: radiating, sharp, dull ache and discomfort  Relieving factors: rest  Aggravating factors: keyboarding and overhead activity          Objective     Postural Observations  Seated posture: fair  Standing posture: fair      Palpation   Left   No palpable tenderness to the anterior deltoid, biceps, infraspinatus, latissimus, levator scapulae, lower trapezius, middle deltoid, pectoralis major, pectoralis minor, posterior deltoid, rhomboids, thoracic paraspinals and triceps.   Tenderness of the middle trapezius and upper trapezius.   Trigger point to thoracic paraspinals.     Right   No palpable tenderness to the infraspinatus, latissimus, pectoralis major and posterior deltoid.   Tenderness of the anterior deltoid, biceps, levator scapulae, lower trapezius, middle deltoid, middle trapezius, pectoralis minor, rhomboids, thoracic paraspinals and upper trapezius.   Trigger point to lower trapezius.     Cervical/Thoracic Screen   Cervical range of motion within normal limits with the following exceptions: Tightness noted within UT with side bending and rotation  to L  Cervical hypomobility noted through downglides L>R  Thoracic range of motion within normal limits with the following exceptions: Assess PA future visit    Neurological Testing     Additional Neurological Details  No N/T to note    Active Range of Motion   Left Shoulder   Flexion: WFL  Abduction: WFL  External rotation BTH: WFL  Internal rotation BTB: WFL    Right Shoulder   Flexion: 132 degrees with pain  Abduction: 127 degrees with pain    Additional Active Range of Motion Details  HBH R: to head  HBB R: to sacrum    Passive Range of Motion     Additional Passive Range of Motion Details  Equal to AROM due to gurading    Strength/Myotome Testing     Left Shoulder     Planes of Motion   Flexion: 4   Abduction: 4   External rotation at 0°: 4+   Internal rotation at 0°: 4+     Right Shoulder     Planes of Motion   Flexion: 4-   Abduction: 3+   External rotation at 0°: 4   Internal rotation at 0°: 4-     Additional Strength Details  Pt unable to maintain testing position for LT strength testing    Tests     Left Shoulder   Negative empty can, Hawkin's, Neer's and painful arc.     Right Shoulder   Positive empty can, Hawkin's and Neer's.   Negative painful arc.               POC Expires Auth Status Start Date Expiration Date PT Visit Limit    4/7 required   30   Date 3/7       Used 1 Remaining 29         Diagnosis:    Precautions:    Primary Goals:    *asterisks by exercise = given for HEP   Manuals 3/7       PROM        GHJ mobs        Cervical mobs        Thoracic mobs?                There Ex        Pulleys        UT stretch        LS stretch HEP       SCM stretch                                        UBE        Neuro Re-Ed        Chin tuck        Scap squeeze        Rows        Shoulder extension        ER                                                                 Re-evaluation              Ther Act              Education  postural awareness, importance of stretching                          Modalities

## 2024-03-11 ENCOUNTER — APPOINTMENT (OUTPATIENT)
Dept: PHYSICAL THERAPY | Facility: CLINIC | Age: 49
End: 2024-03-11
Payer: COMMERCIAL

## 2024-03-14 ENCOUNTER — TELEPHONE (OUTPATIENT)
Dept: PHYSICAL THERAPY | Facility: CLINIC | Age: 49
End: 2024-03-14

## 2024-03-14 ENCOUNTER — APPOINTMENT (OUTPATIENT)
Dept: PHYSICAL THERAPY | Facility: CLINIC | Age: 49
End: 2024-03-14
Payer: COMMERCIAL

## 2024-03-14 NOTE — TELEPHONE ENCOUNTER
Left voicemail for patient; cancelling appointment today as authorization has not been approved yet for PT.

## 2024-03-18 ENCOUNTER — APPOINTMENT (OUTPATIENT)
Dept: PHYSICAL THERAPY | Facility: CLINIC | Age: 49
End: 2024-03-18
Payer: COMMERCIAL

## 2024-03-19 ENCOUNTER — OFFICE VISIT (OUTPATIENT)
Dept: PHYSICAL THERAPY | Facility: CLINIC | Age: 49
End: 2024-03-19
Payer: COMMERCIAL

## 2024-03-19 DIAGNOSIS — G89.29 CHRONIC RIGHT SHOULDER PAIN: ICD-10-CM

## 2024-03-19 DIAGNOSIS — M25.511 CHRONIC RIGHT SHOULDER PAIN: ICD-10-CM

## 2024-03-19 DIAGNOSIS — M77.8 SHOULDER TENDINITIS, RIGHT: Primary | ICD-10-CM

## 2024-03-19 DIAGNOSIS — M75.41 SHOULDER IMPINGEMENT SYNDROME, RIGHT: ICD-10-CM

## 2024-03-19 PROCEDURE — 97110 THERAPEUTIC EXERCISES: CPT

## 2024-03-19 PROCEDURE — 97112 NEUROMUSCULAR REEDUCATION: CPT

## 2024-03-19 PROCEDURE — 97530 THERAPEUTIC ACTIVITIES: CPT

## 2024-03-19 NOTE — PROGRESS NOTES
"Daily Note     Today's date: 3/19/2024  Patient name: Yesenia Booth  : 1975  MRN: [id number]  Referring provider: Liliam Goode MD  Dx:   Encounter Diagnosis     ICD-10-CM    1. Shoulder tendinitis, right  M77.8       2. Shoulder impingement syndrome, right  M75.41       3. Chronic right shoulder pain  M25.511     G89.29                      Subjective: Pt states that her shoulder has been feeling worse.  Lifting things at work is not helping.  She was unsure of any ex's to perform at home.        Objective: See treatment diary below      Assessment: Tolerated treatment well.  Focused on AAROM and stretching to improve pt's overall ROM.  She was most challenged with abduction and IR due to pain.  Cues for form needed with uT stretch and scap squeezes to avoid UT compensation.  Pt also had difficulty with ER stretch in supine due to pain.  Good tolerance to gentle PROM.  HEP updated with printout given.  Pt reports understanding.  Will progress as able.          Plan: Continue per plan of care.        Diagnosis:    Precautions:    Primary Goals:    *asterisks by exercise = given for HEP   Manuals 3/7 3/19      PROM  TH      GHJ mobs        Cervical mobs        Thoracic mobs?        STM  TH      There Ex        Pulleys  2' flex/scap      UT stretch  5\"x10 ea      LS stretch HEP       SCM stretch        IR strap stretch  5\"x8      ABD AAROM with cane  10x      ER stretch with cane  Supine 10x              UBE        Neuro Re-Ed        Chin tuck  20x      Scap squeeze  20x      Rows        Shoulder extension        ER                                                                 Re-evaluation              Ther Act              Education  postural awareness, importance of stretching  HEP                       Modalities                                                                                          "

## 2024-03-24 DIAGNOSIS — E11.9 TYPE 2 DIABETES MELLITUS WITHOUT COMPLICATION, WITHOUT LONG-TERM CURRENT USE OF INSULIN (HCC): ICD-10-CM

## 2024-03-25 ENCOUNTER — APPOINTMENT (OUTPATIENT)
Dept: PHYSICAL THERAPY | Facility: CLINIC | Age: 49
End: 2024-03-25
Payer: COMMERCIAL

## 2024-04-01 ENCOUNTER — OFFICE VISIT (OUTPATIENT)
Dept: PHYSICAL THERAPY | Facility: CLINIC | Age: 49
End: 2024-04-01
Payer: COMMERCIAL

## 2024-04-01 DIAGNOSIS — G89.29 CHRONIC RIGHT SHOULDER PAIN: ICD-10-CM

## 2024-04-01 DIAGNOSIS — M25.511 CHRONIC RIGHT SHOULDER PAIN: ICD-10-CM

## 2024-04-01 DIAGNOSIS — M75.41 SHOULDER IMPINGEMENT SYNDROME, RIGHT: ICD-10-CM

## 2024-04-01 DIAGNOSIS — M77.8 SHOULDER TENDINITIS, RIGHT: Primary | ICD-10-CM

## 2024-04-01 PROCEDURE — 97140 MANUAL THERAPY 1/> REGIONS: CPT | Performed by: PHYSICAL THERAPIST

## 2024-04-01 PROCEDURE — 97530 THERAPEUTIC ACTIVITIES: CPT | Performed by: PHYSICAL THERAPIST

## 2024-04-01 PROCEDURE — 97110 THERAPEUTIC EXERCISES: CPT | Performed by: PHYSICAL THERAPIST

## 2024-04-01 NOTE — PROGRESS NOTES
"Daily Note     Today's date: 2024  Patient name: Yesenia Booth  : 1975  MRN: [id number]  Referring provider: Liliam Goode MD  Dx:   Encounter Diagnosis     ICD-10-CM    1. Shoulder tendinitis, right  M77.8       2. Shoulder impingement syndrome, right  M75.41       3. Chronic right shoulder pain  M25.511     G89.29           Start Time: 1705  Stop Time: 1750  Total time in clinic (min): 45 minutes    Subjective: Patient reports she still has pain and has noticed improvements.      Objective: See treatment diary below      Assessment: Tolerated treatment well. Please note this is patient's 2nd follow-up session since IE. Focused on gentle UE stretching to tolerance and scapular strengthening. Possible Supraspinatus pathology, thus working on infraspinatus/teres minor and deltoid strengthening to tolerance to improve force coupling. Posterior and inferior GHJ glides with PROM with improved stretching noted. Ice pack end of session to address soreness with good response. Educated patient to use ice pack at home especially end of day to address soreness. Patient exhibited good technique with therapeutic exercises and would benefit from continued PT      Plan: Continue per plan of care.  Progress treatment as tolerated.       Diagnosis:    Precautions:    Primary Goals:    *asterisks by exercise = given for HEP   Manuals 3/7 3/19 4/1     PROM  TH DK     GHJ mobs   DK with PROM     Cervical mobs        Thoracic mobs?        STM  TH DK     There Ex        Pulleys  2' flex/scap 2' flex/scap     UT stretch  5\"x10 ea      LS stretch HEP       SCM stretch        Posterior Cap stretch        IR strap stretch  5\"x8 5\"x5     ABD AAROM with cane  10x      ER stretch with cane  Supine 10x              UBE        Neuro Re-Ed        Chin tuck  20x      Scap squeeze  20x      Rows   RTB 2x10     Shoulder extension        ER        Ball circles   Purple x20ea                                                      Re-evaluation "              Ther Act              Education  postural awareness, importance of stretching  HEP  POC, pain science                     Modalities             Heat/ice (PRN)   Ice x5min Right shoulder

## 2024-04-03 NOTE — PROGRESS NOTES
PT Re-Evaluation     Today's date: 2024  Patient name: Yesenia Booth  : 1975  MRN: [id number]  Referring provider: Liliam Goode MD  Dx:   Encounter Diagnosis     ICD-10-CM    1. Shoulder tendinitis, right  M77.8       2. Shoulder impingement syndrome, right  M75.41       3. Chronic right shoulder pain  M25.511     G89.29             Start Time: 1700  Stop Time: 1745  Total time in clinic (min): 45 minutes    Assessment  Assessment details: Yesenia Booth is a pleasant 48 y.o. female who presents with R shoulder pain and impingement. She presents for re-evaluation today with very minimal changes in pain levels. Please note patient has only attended 2 sessions since IE due to delay in insurance auth coverage. This affects her overall progress over the past month. Noted slightly improved shoulder mobility and UE strength. She reports improved ability since IE with functional overhead reaching. She continues to have pain with ADLs. Noted some relief with PT exercises and ice pack post activities. The patient's greatest concerns are worry over not knowing what's wrong, concern at no signs of improvement, fear of not being able to keep active, and future ill health (and wanting to prevent it).      No further referral appears necessary at this time based upon examination results.    Primary movement impairment diagnosis of poor postural awareness and strength resulting in pathoanatomical symptoms of decreased ROM and limiting her ability to care for self, dress independently, exercise or recreation, go to work, lift, perform household chores, perform yard work, reach overhead, turn to look over shoulder, and wash behind the back.    Primary Impairments:  1) shoulder ROM  2) postural and shoulder strength    Etiologic factors include repetitive poor body mechanics. Patient will benefit from continued skilled PT to further improve pain levels, UE mobility, posture, and overall ease and efficiency with  ADLs.    Impairments: abnormal coordination, abnormal muscle firing, abnormal muscle tone, abnormal or restricted ROM, abnormal movement, activity intolerance, impaired physical strength, lacks appropriate home exercise program, pain with function, scapular dyskinesis, poor posture  and poor body mechanics    Symptom irritability: moderateUnderstanding of Dx/Px/POC: good   Prognosis: good  Prognosis details: Positive prognostic indicators include positive attitude toward recovery and good understanding of diagnosis and treatment plan options.  Negative prognostic indicators include chronicity of symptoms, high symptom irritability, minimal changes in pain with movement, and multiple concurrent orthopedic problems.      Goals  Impairment Goals 4-6 weeks  - Decrease pain to 2/10. Slightly improved  - Improve shoulder AROM to equal to the unaffected upper extremity. Slightly Improved  - Increase shoulder strength to 5/5 throughout. Slightly improved  - Increase scapular strength to 5/5 throughout. Slightly Improved    Functional Goals 6-8 weeks. Ongoing, 4/4/2024  - Return to Prior Level of Function  - Patient will be independent with HEP  - Patient will be able to reach overhead without increased pain/compensation/difficulty  - Patient will be able to reach behind back without increased pain/compensation/difficulty   - Patient will be able to wash hair without increased pain/compensation/difficulty   - Patient will be able to don/doff shirt/jacket without increased pain/compensation/difficulty  - Patient will be able to lift >30 pounds with proper mechanics         Plan  Patient would benefit from: skilled physical therapy  Planned modality interventions: Modalities PRN.  Planned therapy interventions: activity modification, manual therapy, neuromuscular re-education, patient education, therapeutic activities, therapeutic exercise, graded activity, home exercise program, behavior modification, self care, joint  mobilization, IASTM, body mechanics training and postural training  Frequency: 2x week  Duration in weeks: 8  Treatment plan discussed with: patient        Subjective Evaluation    History of Present Illness  Mechanism of injury: WORK/SCHOOL: , standing, mixing, chopping, lifting, paperwork, computer work, cooking  HOME LIFE: with family  HOBBIES/EXERCISE: TV, relaxing,   PLOF:  no other noteworthy injuries  HISTORY OF CURRENT INJURY:  Approx 3 months ago, pt worked to move in her bed and when she did, she had a sharp pain that went down her arm.  It is intermittent.  The sharp pain can stay for a few minutes. Pt is R hand dominant  PAIN LOCATION/DESCRIPTORS: lateral aspect of proximal UE, radiates down radial distribution  AGGRAVATING FACTORS:  reaching overhead, dressing, reaching behind back, lifting, chopping/cutting, mixing,   EASES: nothing to note,   DAY PATTERN: morning can be stiffer due to sleeping on R side  IMAGING:  no imaging done  Mujda denies a new onset of Bladder incontinence, Bowel dysfunction, Dizziness, Dysphagia, Dysarthria, Drop attacks, Diplopia, Nausea, Ataxia, Recent unexplained weight loss, Clumsy or unsteadiness, Constant night pain, History of cancer, Tingling, Numbness, and Saddle anesthesia .  PATIENT GOALS: feel better,     Patient Goals  Patient goals for therapy: decreased pain, increased motion, return to work, return to sport/leisure activities, independence with ADLs/IADLs, increased strength and decreased edema    Pain  Current pain rating: 3  At best pain ratin  At worst pain ratin  Quality: radiating, sharp, dull ache and discomfort (soreness)  Relieving factors: rest  Aggravating factors: keyboarding, overhead activity and lifting          Objective     Postural Observations  Seated posture: fair  Standing posture: fair      Palpation   Left   No palpable tenderness to the anterior deltoid, biceps, infraspinatus, latissimus, levator scapulae, lower trapezius,  middle deltoid, pectoralis major, pectoralis minor, posterior deltoid, rhomboids, thoracic paraspinals and triceps.   Tenderness of the middle trapezius and upper trapezius.   Trigger point to thoracic paraspinals.     Right   No palpable tenderness to the infraspinatus, latissimus, pectoralis major and posterior deltoid.   Tenderness of the anterior deltoid, biceps, levator scapulae, lower trapezius, middle deltoid, middle trapezius, pectoralis minor, rhomboids, thoracic paraspinals and upper trapezius.   Trigger point to lower trapezius.     Cervical/Thoracic Screen   Cervical range of motion within normal limits with the following exceptions: Tightness noted within UT with side bending and rotation to L  Cervical hypomobility noted through downglides L>R  Thoracic range of motion within normal limits with the following exceptions: Assess PA future visit    Neurological Testing     Additional Neurological Details  No N/T to note    Active Range of Motion   Left Shoulder   Flexion: WFL  Abduction: WFL  External rotation BTH: WFL  Internal rotation BTB: WFL    Right Shoulder   Flexion: 150 degrees with pain  Abduction: 140 degrees with pain  External rotation 0°: WFL  External rotation BTH: T2 with pain  Internal rotation 0°: WFL  Internal rotation BTB: sacrum with pain    Passive Range of Motion     Additional Passive Range of Motion Details  Equal to AROM due to gurading    Strength/Myotome Testing     Left Shoulder     Planes of Motion   Flexion: 4   Abduction: 4   External rotation at 0°: 4+   Internal rotation at 0°: 4+     Right Shoulder     Planes of Motion   Flexion: 4-   Abduction: 3+   External rotation at 0°: 4-   Internal rotation at 0°: 4     Isolated Muscles   Rhomboids: 4-   Serratus anterior: 4     Additional Strength Details  Pt unable to maintain testing position for LT strength testing    Tests     Left Shoulder   Negative empty can, Hawkin's, Neer's and painful arc.     Right Shoulder   Positive  "empty can, Hawkin's and Neer's.   Negative painful arc.       Flowsheet Rows      Flowsheet Row Most Recent Value   PT/OT G-Codes    Current Score 54   Projected Score 69               Diagnosis:    Precautions:    Primary Goals:    *asterisks by exercise = given for HEP   Manuals 3/7 3/19 4/1 4/4    PROM  TH DK DK    GHJ mobs   DK with PROM DK with PROM    Cervical mobs        Thoracic mobs?        IASTM    DK    STM  TH DK DK    There Ex        Pulleys  2' flex/scap 2' flex/scap     UT stretch  5\"x10 ea      LS stretch HEP       SCM stretch        Posterior Cap stretch        IR strap stretch  5\"x8 5\"x5     ABD AAROM with cane  10x      ER stretch with cane  Supine 10x              UBE        Neuro Re-Ed        Chin tuck  20x      Scap squeeze  20x      Rows   RTB 2x10     Shoulder extension        ER        Ball circles   Purple x20ea                                                      Re-evaluation        DK      Ther Act              Education  postural awareness, importance of stretching  HEP  POC, pain science  POC, pain science, use of ice                   Modalities             Heat/ice (PRN)   Ice x5min Right shoulder Ice x10min Right shoulder                                                                           "

## 2024-04-04 ENCOUNTER — EVALUATION (OUTPATIENT)
Dept: PHYSICAL THERAPY | Facility: CLINIC | Age: 49
End: 2024-04-04
Payer: COMMERCIAL

## 2024-04-04 DIAGNOSIS — M25.511 CHRONIC RIGHT SHOULDER PAIN: ICD-10-CM

## 2024-04-04 DIAGNOSIS — M75.41 SHOULDER IMPINGEMENT SYNDROME, RIGHT: ICD-10-CM

## 2024-04-04 DIAGNOSIS — G89.29 CHRONIC RIGHT SHOULDER PAIN: ICD-10-CM

## 2024-04-04 DIAGNOSIS — M77.8 SHOULDER TENDINITIS, RIGHT: Primary | ICD-10-CM

## 2024-04-04 PROCEDURE — 97530 THERAPEUTIC ACTIVITIES: CPT | Performed by: PHYSICAL THERAPIST

## 2024-04-04 PROCEDURE — 97140 MANUAL THERAPY 1/> REGIONS: CPT | Performed by: PHYSICAL THERAPIST

## 2024-04-09 ENCOUNTER — OFFICE VISIT (OUTPATIENT)
Dept: PHYSICAL THERAPY | Facility: CLINIC | Age: 49
End: 2024-04-09
Payer: COMMERCIAL

## 2024-04-09 DIAGNOSIS — M75.41 SHOULDER IMPINGEMENT SYNDROME, RIGHT: ICD-10-CM

## 2024-04-09 DIAGNOSIS — G89.29 CHRONIC RIGHT SHOULDER PAIN: ICD-10-CM

## 2024-04-09 DIAGNOSIS — M25.511 CHRONIC RIGHT SHOULDER PAIN: ICD-10-CM

## 2024-04-09 DIAGNOSIS — M77.8 SHOULDER TENDINITIS, RIGHT: Primary | ICD-10-CM

## 2024-04-09 PROCEDURE — 97140 MANUAL THERAPY 1/> REGIONS: CPT

## 2024-04-09 PROCEDURE — 97112 NEUROMUSCULAR REEDUCATION: CPT

## 2024-04-09 PROCEDURE — 97110 THERAPEUTIC EXERCISES: CPT

## 2024-04-09 NOTE — PROGRESS NOTES
"Daily Note     Today's date: 2024  Patient name: Yesenia Booth  : 1975  MRN: [id number]  Referring provider: Liliam Goode MD  Dx:   Encounter Diagnosis     ICD-10-CM    1. Shoulder tendinitis, right  M77.8       2. Shoulder impingement syndrome, right  M75.41       3. Chronic right shoulder pain  M25.511     G89.29           Start Time: 1700  Stop Time: 1745  Total time in clinic (min): 45 minutes    Subjective: Same type of pain about 6/10.       Objective: See treatment diary below      Assessment: Tolerated treatment well. She had some sharp pain with shoulder blade squeeze as well as presenting with trouble during IR stretch.  Pt continues with discomfort during AB today with good tolerance to manual interventions.  Added AAROM to continue to stretch after to continues to promote increased motion. Patient demonstrated fatigue post treatment, exhibited good technique with therapeutic exercises, and would benefit from continued PT      Plan: Continue per plan of care.  Progress treatment as tolerated.       Diagnosis:    Precautions:    Primary Goals:    *asterisks by exercise = given for HEP   Manuals  3/19 4/1 4/4 4/9   PROM  TH DK DK    GHJ mobs   DK with PROM DK with PROM    Cervical mobs        Thoracic mobs?        IASTM    DK    STM  TH DK DK    There Ex        Pulleys  2' flex/scap 2' flex/scap  2' flex/scap   UT stretch  5\"x10 ea   10\"5x ea   LS stretch        SCM stretch        Posterior Cap stretch        IR strap stretch  5\"x8 5\"x5  5\"3x  pain   ABD AAROM with cane  10x   x20   ER stretch with cane  Supine 10x   Supine 10x   Sleeper stretch NV?                       UBE        Neuro Re-Ed        Chin tuck  20x      Scap squeeze  20x   3\"20x   Rows   RTB 2x10     Shoulder extension        ER     x10   Ball circles   Purple x20ea  Purple x20 ea                                                    Re-evaluation       DK      Ther Act             Education   HEP  POC, pain science  POC, pain " science, use of ice                  Modalities            Heat/ice (PRN)   Ice x5min Right shoulder Ice x10min Right shoulder

## 2024-04-11 ENCOUNTER — TELEPHONE (OUTPATIENT)
Dept: FAMILY MEDICINE CLINIC | Facility: CLINIC | Age: 49
End: 2024-04-11

## 2024-04-11 ENCOUNTER — OFFICE VISIT (OUTPATIENT)
Dept: PHYSICAL THERAPY | Facility: CLINIC | Age: 49
End: 2024-04-11
Payer: COMMERCIAL

## 2024-04-11 DIAGNOSIS — M75.41 SHOULDER IMPINGEMENT SYNDROME, RIGHT: ICD-10-CM

## 2024-04-11 DIAGNOSIS — G89.29 CHRONIC RIGHT SHOULDER PAIN: ICD-10-CM

## 2024-04-11 DIAGNOSIS — M25.511 CHRONIC RIGHT SHOULDER PAIN: ICD-10-CM

## 2024-04-11 DIAGNOSIS — M77.8 SHOULDER TENDINITIS, RIGHT: Primary | ICD-10-CM

## 2024-04-11 PROCEDURE — 97140 MANUAL THERAPY 1/> REGIONS: CPT

## 2024-04-11 PROCEDURE — 97112 NEUROMUSCULAR REEDUCATION: CPT | Performed by: PHYSICAL THERAPIST

## 2024-04-11 PROCEDURE — 97110 THERAPEUTIC EXERCISES: CPT

## 2024-04-11 NOTE — PROGRESS NOTES
"Daily Note     Today's date: 2024  Patient name: Yesenia Booth  : 1975  MRN: [id number]  Referring provider: Liliam Goode MD  Dx:   Encounter Diagnosis     ICD-10-CM    1. Shoulder tendinitis, right  M77.8       2. Shoulder impingement syndrome, right  M75.41       3. Chronic right shoulder pain  M25.511     G89.29           Start Time: 1700  Stop Time: 1746  Total time in clinic (min): 46 minutes    Subjective: Pt states that she continues to have about the same pain, that hasn't changed much.  It is sore and painful.  Pt states that it feels weak also, she dropped a tray at work and didn't realize it was even happening.        Objective: See treatment diary below      Assessment: Tolerated treatment well.  Pt challenged with AAROM due to increased shoulder pain.  She has low tolerance to manuals, especially PROM into ER, IR and abduction.  Sleeper stretch added with pt reporting pain, however she has better tolerance to this position.  Pt has been attending therapy however she self reports that she is not always good at doing her ex's at home.  This may be playing a part in her not noting any improvements.  Pt is motivated during session, she requires encouragement at times to move into a stretch to tolerance as she has some fear avoidance.  Pt will benefit from continued therapy      Plan: Continue per plan of care.      Diagnosis:  R shoulder pain   Precautions:    Primary Goals:    *asterisks by exercise = given for HEP   Manuals 4/11 3/19 4/1 4/4 4/9   PROM TH TH DK DK    GHJ mobs   DK with PROM DK with PROM    Cervical mobs        Thoracic mobs?        IASTM    DK    STM TH TH DK DK    There Ex        Pulleys 2' flex/scap 2' flex/scap 2' flex/scap  2' flex/scap   UT stretch  5\"x10 ea   10\"5x ea   LS stretch        SCM stretch        Posterior Cap stretch        IR strap stretch 3\"x10 5\"x8 5\"x5  5\"3x  pain   ABD AAROM with cane 20x 10x   x20   ER stretch with cane Supine 10x Supine 10x   Supine 10x " "  Sleeper stretch 10x                       UBE        Neuro Re-Ed        Chin tuck 20x 20x      Scap squeeze  20x   3\"20x   Rows   RTB 2x10     Shoulder extension        ER     x10   Ball circles   Purple x20ea  Purple x20 ea                                                    Re-evaluation       DK      Ther Act             Education   HEP  POC, pain science  POC, pain science, use of ice                  Modalities            Heat/ice (PRN) Ice x8 min Right shoulder  Ice x5min Right shoulder Ice x10min Right shoulder                                                                           "

## 2024-04-11 NOTE — TELEPHONE ENCOUNTER
Pt came in stating she is doing PT but is losing strength in her arm. She stated she was at work today lifting something and lost control of her hand/arm and the object fell. She asked if she could get a note allowing her to rest her arm for a few days.

## 2024-04-16 ENCOUNTER — OFFICE VISIT (OUTPATIENT)
Dept: PHYSICAL THERAPY | Facility: CLINIC | Age: 49
End: 2024-04-16
Payer: COMMERCIAL

## 2024-04-16 DIAGNOSIS — G89.29 CHRONIC RIGHT SHOULDER PAIN: ICD-10-CM

## 2024-04-16 DIAGNOSIS — M77.8 SHOULDER TENDINITIS, RIGHT: Primary | ICD-10-CM

## 2024-04-16 DIAGNOSIS — M25.511 CHRONIC RIGHT SHOULDER PAIN: ICD-10-CM

## 2024-04-16 DIAGNOSIS — M75.41 SHOULDER IMPINGEMENT SYNDROME, RIGHT: ICD-10-CM

## 2024-04-16 PROCEDURE — 97112 NEUROMUSCULAR REEDUCATION: CPT | Performed by: PHYSICAL THERAPIST

## 2024-04-16 PROCEDURE — 97110 THERAPEUTIC EXERCISES: CPT

## 2024-04-16 PROCEDURE — 97140 MANUAL THERAPY 1/> REGIONS: CPT

## 2024-04-16 NOTE — PROGRESS NOTES
"Daily Note     Today's date: 2024  Patient name: Yesenia Booth  : 1975  MRN: [id number]  Referring provider: Liliam Goode MD  Dx:   Encounter Diagnosis     ICD-10-CM    1. Shoulder tendinitis, right  M77.8       2. Shoulder impingement syndrome, right  M75.41       3. Chronic right shoulder pain  M25.511     G89.29           Start Time: 1700  Stop Time: 1745  Total time in clinic (min): 45 minutes    Subjective: Pt states that she is feeling about 40% better.  She reports still having pain with some motions but feels better than she did last week.      Objective: See treatment diary below      Assessment: Tolerated treatment well.  Focused on AAROM and stretching to improve pts ROM and decrease pain.  Good tolerance to wall slides in flexion.  Added side-lying lower trap set to improve postural mechanics and shoulder stability. Pt will benefit from continued therapy.        Plan: Continue per plan of care.      Diagnosis:  R shoulder pain   Precautions:    Primary Goals:    *asterisks by exercise = given for HEP   Manuals    PROM TH TH DK DK    GHJ mobs   DK with PROM DK with PROM    Cervical mobs        Thoracic mobs?        IASTM    DK    STM TH  DK DK    There Ex        Pulleys 2' flex/scap 2' flex/scap 2' flex/scap  2' flex/scap   UT stretch     10\"5x ea   LS stretch        SCM stretch        Posterior Cap stretch        IR strap stretch 3\"x10  5\"x5  5\"3x  pain   ABD AAROM with cane 20x 20x   x20   ER stretch with cane Supine 10x Supine  15x   Supine 10x   Sleeper stretch 10x 10x      Wall slides  Flex 20x              UBE        Neuro Re-Ed        Chin tuck 20x 20x supine      Scap squeeze     3\"20x   Rows   RTB 2x10     Shoulder extension        ER     x10   Ball circles   Purple x20ea  Purple x20 ea   Serratus press  20x      S/L lower trap set  Cues 2x10                                       Re-evaluation      DK      Ther Act            Education    POC, pain science  POC, " pain science, use of ice                 Modalities            Heat/ice (PRN) Ice x8 min Right shoulder Ice x8 min Right shoulder Ice x5min Right shoulder Ice x10min Right shoulder

## 2024-04-16 NOTE — TELEPHONE ENCOUNTER
Called pt to write letter, she said she will call back to let us know the specific dates she needs off of work.

## 2024-04-18 ENCOUNTER — APPOINTMENT (OUTPATIENT)
Dept: PHYSICAL THERAPY | Facility: CLINIC | Age: 49
End: 2024-04-18
Payer: COMMERCIAL

## 2024-04-23 ENCOUNTER — OFFICE VISIT (OUTPATIENT)
Dept: PHYSICAL THERAPY | Facility: CLINIC | Age: 49
End: 2024-04-23
Payer: COMMERCIAL

## 2024-04-23 DIAGNOSIS — M25.511 CHRONIC RIGHT SHOULDER PAIN: ICD-10-CM

## 2024-04-23 DIAGNOSIS — M77.8 SHOULDER TENDINITIS, RIGHT: Primary | ICD-10-CM

## 2024-04-23 DIAGNOSIS — M75.41 SHOULDER IMPINGEMENT SYNDROME, RIGHT: ICD-10-CM

## 2024-04-23 DIAGNOSIS — G89.29 CHRONIC RIGHT SHOULDER PAIN: ICD-10-CM

## 2024-04-23 PROCEDURE — 97110 THERAPEUTIC EXERCISES: CPT

## 2024-04-23 PROCEDURE — 97140 MANUAL THERAPY 1/> REGIONS: CPT

## 2024-04-23 PROCEDURE — 97530 THERAPEUTIC ACTIVITIES: CPT | Performed by: PHYSICAL THERAPIST

## 2024-04-23 NOTE — PROGRESS NOTES
"Daily Note     Today's date: 2024  Patient name: Yesenia Booth  : 1975  MRN: [id number]  Referring provider: Liliam Goode MD  Dx:   Encounter Diagnosis     ICD-10-CM    1. Shoulder tendinitis, right  M77.8       2. Shoulder impingement syndrome, right  M75.41       3. Chronic right shoulder pain  M25.511     G89.29           Start Time: 1700  Stop Time: 1746  Total time in clinic (min): 46 minutes    Subjective: Pt states that she is still about the 40% better that she reported last visit.  Pt admits that she is not good about doing her HEP so understands that may be contributing to her continued sx's.      Objective: See treatment diary below      Assessment: Tolerated treatment well.  Focused on AAROM/stretching to improve ROM and decrease pain.  Pt most challenged with IR stretch due to limited ROM and increased pain.  Improving abduction AAROM noted with standing cane stretch.  Education provided on importance of HEP and stretching with pt reporting understanding.  Pt will benefit from continued therapy.        Plan: Continue per plan of care.      Diagnosis:  R shoulder pain   Precautions:    Primary Goals:    *asterisks by exercise = given for HEP   Manuals    PROM TH TH TH DK    GHJ mobs    DK with PROM    Cervical mobs        Thoracic mobs?        IASTM    DK    STM TH   DK    There Ex        Pulleys 2' flex/scap 2' flex/scap 2' flex/scap  2' flex/scap   UT stretch     10\"5x ea   LS stretch        SCM stretch        Posterior Cap stretch   5\"x10     IR strap stretch 3\"x10  3\"x10  5\"3x  pain   ABD AAROM with cane 20x 20x 20x 2\"  x20   ER stretch with cane Supine 10x Supine  15x Supine   15x  Supine 10x   Sleeper stretch 10x 10x 10x     Wall slides  Flex 20x Flex/abd 20x ea             UBE        Neuro Re-Ed        Chin tuck 20x 20x supine      Scap squeeze     3\"20x   Rows        Shoulder extension        ER     x10   Ball circles     Purple x20 ea   Serratus press  20x    "   S/L lower trap set  Cues 2x10                                       Re-evaluation     DK      Ther Act           Education   HEP importance  POC, pain science, use of ice                Modalities           Heat/ice (PRN) Ice x8 min Right shoulder Ice x8 min Right shoulder Ice x8 min Right shoulder Ice x10min Right shoulder

## 2024-04-25 ENCOUNTER — OFFICE VISIT (OUTPATIENT)
Dept: PHYSICAL THERAPY | Facility: CLINIC | Age: 49
End: 2024-04-25
Payer: COMMERCIAL

## 2024-04-25 DIAGNOSIS — M75.41 SHOULDER IMPINGEMENT SYNDROME, RIGHT: ICD-10-CM

## 2024-04-25 DIAGNOSIS — G89.29 CHRONIC RIGHT SHOULDER PAIN: ICD-10-CM

## 2024-04-25 DIAGNOSIS — M77.8 SHOULDER TENDINITIS, RIGHT: Primary | ICD-10-CM

## 2024-04-25 DIAGNOSIS — M25.511 CHRONIC RIGHT SHOULDER PAIN: ICD-10-CM

## 2024-04-25 PROCEDURE — 97110 THERAPEUTIC EXERCISES: CPT

## 2024-04-25 PROCEDURE — 97110 THERAPEUTIC EXERCISES: CPT | Performed by: PHYSICAL THERAPIST

## 2024-04-25 PROCEDURE — 97140 MANUAL THERAPY 1/> REGIONS: CPT

## 2024-04-25 NOTE — PROGRESS NOTES
"Daily Note     Today's date: 2024  Patient name: Yesenia Booth  : 1975  MRN: [id number]  Referring provider: Liliam Goode MD  Dx:   Encounter Diagnosis     ICD-10-CM    1. Shoulder tendinitis, right  M77.8       2. Shoulder impingement syndrome, right  M75.41       3. Chronic right shoulder pain  M25.511     G89.29           Start Time: 1700  Stop Time: 1745  Total time in clinic (min): 45 minutes    Subjective: Pt states that she did her ex's yesterday and did them this morning too.  She reports that it probably did help her to feel better.        Objective: See treatment diary below      Assessment: Tolerated treatment well.  Continued with outlined program as indicated focusing on improving her ROM.  Pt continues with some challenges with IR and ER.  Improvements noted on abduction AAROM since last visit.        Plan: Continue per plan of care.      Diagnosis:  R shoulder pain   Precautions:    Primary Goals:    *asterisks by exercise = given for HEP   Manuals    PROM TH TH TH TH    GHJ mobs        Cervical mobs        Thoracic mobs?        IASTM        STM TH       There Ex        Pulleys 2' flex/scap 2' flex/scap 2' flex/scap 2' flex/scap 2' flex/scap   UT stretch     10\"5x ea   LS stretch        SCM stretch        Posterior Cap stretch   5\"x10     IR strap stretch 3\"x10  3\"x10 3\"x10 5\"3x  pain   ABD AAROM with cane 20x 20x 20x 2\" 20x2\" x20   ER stretch with cane Supine 10x Supine  15x Supine   15x Supine   15x Supine 10x   Sleeper stretch 10x 10x 10x 10x    Wall slides  Flex 20x Flex/abd 20x ea Flex/abd 20x ea            UBE        Neuro Re-Ed        Chin tuck 20x 20x supine      Scap squeeze     3\"20x   Rows        Shoulder extension        ER     x10   Ball circles     Purple x20 ea   Serratus press  20x      S/L lower trap set  Cues 2x10                                       Re-evaluation          Ther Act          Education   HEP importance                Modalities       "    Heat/ice (PRN) Ice x8 min Right shoulder Ice x8 min Right shoulder Ice x8 min Right shoulder Ice x8 min Right shoulder

## 2024-04-30 ENCOUNTER — EVALUATION (OUTPATIENT)
Dept: PHYSICAL THERAPY | Facility: CLINIC | Age: 49
End: 2024-04-30
Payer: COMMERCIAL

## 2024-04-30 DIAGNOSIS — G89.29 CHRONIC RIGHT SHOULDER PAIN: ICD-10-CM

## 2024-04-30 DIAGNOSIS — M75.41 SHOULDER IMPINGEMENT SYNDROME, RIGHT: ICD-10-CM

## 2024-04-30 DIAGNOSIS — M25.511 CHRONIC RIGHT SHOULDER PAIN: ICD-10-CM

## 2024-04-30 DIAGNOSIS — M77.8 SHOULDER TENDINITIS, RIGHT: Primary | ICD-10-CM

## 2024-04-30 PROCEDURE — 97112 NEUROMUSCULAR REEDUCATION: CPT | Performed by: PHYSICAL THERAPIST

## 2024-04-30 PROCEDURE — 97530 THERAPEUTIC ACTIVITIES: CPT | Performed by: PHYSICAL THERAPIST

## 2024-04-30 PROCEDURE — 97140 MANUAL THERAPY 1/> REGIONS: CPT | Performed by: PHYSICAL THERAPIST

## 2024-04-30 NOTE — PROGRESS NOTES
PT Re-Evaluation     Today's date: 2024  Patient name: Yesenia Booth  : 1975  MRN: [id number]  Referring provider: Liliam Goode MD  Dx:   Encounter Diagnosis     ICD-10-CM    1. Shoulder tendinitis, right  M77.8       2. Shoulder impingement syndrome, right  M75.41       3. Chronic right shoulder pain  M25.511     G89.29               Start Time: 1700  Stop Time: 1745  Total time in clinic (min): 45 minutes    Assessment  Assessment details: Yesenia Booth is a pleasant 48 y.o. female who presents with R shoulder pain and impingement. She presents for re-evaluation today with improvements in pain levels noted overall. She reports about 30-40% improvements and progress with functional goals since start of PT. Please note that start of PT was delayed since IE  beginning of March due to delay in insurance auth coverage. This impacts patient's overall consistency and progress with reaping max benefits from PT. Noted slight improvements in Right shoulder ROM and Right UE strength. She continues to have pain and end-range especially flexion and abduction, with pain noted with MMT of shoulder abduction and external rotators primarily. Improve reaching behind head and back mobility, indicating improved mobility with self-care tasks and other ADLs. Noted slightly improved shoulder mobility and UE strength. Noted some relief with PT exercises and ice pack post activities. The patient's greatest concerns are worry over not knowing what's wrong, fear of not being able to keep active, and future ill health (and wanting to prevent it).      No further referral appears necessary at this time based upon examination results.    Primary movement impairment diagnosis of poor postural awareness and strength resulting in pathoanatomical symptoms of decreased ROM and limiting her ability to care for self, dress independently, exercise or recreation, go to work, lift, perform household chores, perform yard work, reach overhead,  turn to look over shoulder, and wash behind the back.    Primary Impairments:  1) shoulder ROM  2) postural and shoulder strength    Etiologic factors include repetitive poor body mechanics. Patient will benefit from continued skilled PT to further improve pain levels, UE mobility, posture, and overall ease and efficiency with ADLs. Please note that she will also be consulting orthopedic physician next week for further follow-up and assessment.    Impairments: abnormal coordination, abnormal muscle firing, abnormal muscle tone, abnormal or restricted ROM, abnormal movement, activity intolerance, impaired physical strength, lacks appropriate home exercise program, pain with function, scapular dyskinesis, poor posture  and poor body mechanics    Symptom irritability: moderateUnderstanding of Dx/Px/POC: good   Prognosis: good  Prognosis details: Positive prognostic indicators include positive attitude toward recovery and good understanding of diagnosis and treatment plan options.  Negative prognostic indicators include chronicity of symptoms, high symptom irritability, minimal changes in pain with movement, and multiple concurrent orthopedic problems.      Goals  Impairment Goals 4-6 weeks  - Decrease pain to 2/10. improved  - Improve shoulder AROM to equal to the unaffected upper extremity. Improved  - Increase shoulder strength to 5/5 throughout. improved  - Increase scapular strength to 5/5 throughout. Slightly Improved    Functional Goals 6-8 weeks  - Return to Prior Level of Function. Ongoing  - Patient will be independent with HEP. Slightly Improved, frequent reminders required on importance of HEP  - Patient will be able to reach overhead without increased pain/compensation/difficulty. Improved  - Patient will be able to reach behind back without increased pain/compensation/difficulty . Improved  - Patient will be able to wash hair without increased pain/compensation/difficulty . Improved  - Patient will be able  to don/doff shirt/jacket without increased pain/compensation/difficulty. Improved  - Patient will be able to lift >30 pounds with proper mechanics . Ongoing        Plan  Patient would benefit from: skilled physical therapy  Planned modality interventions: Modalities PRN.  Planned therapy interventions: activity modification, manual therapy, neuromuscular re-education, patient education, therapeutic activities, therapeutic exercise, graded activity, home exercise program, behavior modification, self care, joint mobilization, IASTM, body mechanics training and postural training  Frequency: 2x week  Duration in weeks: 8  Treatment plan discussed with: patient        Subjective Evaluation    History of Present Illness  Mechanism of injury: WORK/SCHOOL: , standing, mixing, chopping, lifting, paperwork, computer work, cooking  HOME LIFE: with family  HOBBIES/EXERCISE: TV, relaxing,   PLOF:  no other noteworthy injuries  HISTORY OF CURRENT INJURY:  Approx 3 months ago, pt worked to move in her bed and when she did, she had a sharp pain that went down her arm.  It is intermittent.  The sharp pain can stay for a few minutes. Pt is R hand dominant  PAIN LOCATION/DESCRIPTORS: lateral aspect of proximal UE, radiates down radial distribution  AGGRAVATING FACTORS:  reaching overhead, dressing, reaching behind back, lifting, chopping/cutting, mixing,   EASES: nothing to note,   DAY PATTERN: morning can be stiffer due to sleeping on R side  IMAGING:  no imaging done  Mujda denies a new onset of Bladder incontinence, Bowel dysfunction, Dizziness, Dysphagia, Dysarthria, Drop attacks, Diplopia, Nausea, Ataxia, Recent unexplained weight loss, Clumsy or unsteadiness, Constant night pain, History of cancer, Tingling, Numbness, and Saddle anesthesia .  PATIENT GOALS: feel better,     Patient Goals  Patient goals for therapy: decreased pain, increased motion, return to work, return to sport/leisure activities, independence with  ADLs/IADLs, increased strength and decreased edema    Pain  Current pain ratin  At best pain ratin  At worst pain ratin  Quality: radiating, sharp, dull ache and discomfort (soreness)  Relieving factors: rest  Aggravating factors: keyboarding, overhead activity and lifting          Objective     Postural Observations  Seated posture: fair  Standing posture: fair      Palpation   Left   No palpable tenderness to the anterior deltoid, biceps, infraspinatus, latissimus, levator scapulae, lower trapezius, middle deltoid, pectoralis major, pectoralis minor, posterior deltoid, rhomboids, thoracic paraspinals and triceps.   Tenderness of the middle trapezius and upper trapezius.   Trigger point to thoracic paraspinals.     Right   No palpable tenderness to the infraspinatus, latissimus, pectoralis major and posterior deltoid.   Tenderness of the anterior deltoid, biceps, levator scapulae, lower trapezius, middle deltoid, middle trapezius, pectoralis minor, rhomboids, thoracic paraspinals and upper trapezius.   Trigger point to lower trapezius.     Cervical/Thoracic Screen   Cervical range of motion within normal limits with the following exceptions: Tightness noted within UT with side bending and rotation to L  Cervical hypomobility noted through downglides L>R  Thoracic range of motion within normal limits with the following exceptions: Assess PA future visit    Neurological Testing     Additional Neurological Details  No N/T to note    Active Range of Motion   Left Shoulder   Flexion: WFL  Abduction: WFL  External rotation BTH: WFL  Internal rotation BTB: WFL    Right Shoulder   Flexion: 150 degrees with pain  Abduction: 140 degrees with pain  External rotation 0°: WFL  External rotation BTH: T2 with pain  Internal rotation 0°: WFL  Internal rotation BTB: sacrum with pain    Passive Range of Motion     Additional Passive Range of Motion Details  Equal to AROM due to gurading    Strength/Myotome Testing  "    Left Shoulder     Planes of Motion   Flexion: 4   Abduction: 4   External rotation at 0°: 4+   Internal rotation at 0°: 4+     Right Shoulder     Planes of Motion   Flexion: 4-   Abduction: 4-   External rotation at 0°: 4-   Internal rotation at 0°: 4     Isolated Muscles   Rhomboids: 4-   Serratus anterior: 4     Additional Strength Details  Pt unable to maintain testing position for LT strength testing    Tests     Left Shoulder   Negative empty can, Hawkin's, Neer's and painful arc.     Right Shoulder   Positive empty can, Hawkin's and Neer's.   Negative painful arc.                Diagnosis:  R shoulder pain   Precautions:    Primary Goals:    *asterisks by exercise = given for HEP   Manuals 4/11 4/16 4/23 4/25 4/30   PROM  TH TH TH DK   GHJ mobs     DK, MVMs   Cervical mobs        Thoracic mobs?        IAS        STM TH       There Ex        Pulleys 2' flex/scap 2' flex/scap 2' flex/scap 2' flex/scap    UT stretch        LS stretch        SCM stretch        Posterior Cap stretch   5\"x10     IR strap stretch 3\"x10  3\"x10 3\"x10    ABD AAROM with cane 20x 20x 20x 2\" 20x2\"    ER stretch with cane Supine 10x Supine  15x Supine   15x Supine   15x Supine @ 45* ABD 15x   Sleeper stretch 10x 10x 10x 10x    Wall slides  Flex 20x Flex/abd 20x ea Flex/abd 20x ea            UBE        Neuro Re-Ed        Chin tuck 20x 20x supine      Scap squeeze        Rows        Shoulder extension        ER        Ball circles        Serratus press  20x      S/L lower trap set  Cues 2x10                                       Re-evaluation     DK    Ther Act         Education   HEP importance  HEP importance, POC            Modalities         Heat/ice (PRN) Ice x8 min Right shoulder Ice x8 min Right shoulder Ice x8 min Right shoulder Ice x8 min Right shoulder Ice x8 min Right shoulder                                                                  "

## 2024-05-02 ENCOUNTER — APPOINTMENT (OUTPATIENT)
Dept: PHYSICAL THERAPY | Facility: CLINIC | Age: 49
End: 2024-05-02
Payer: COMMERCIAL

## 2024-05-06 ENCOUNTER — TELEPHONE (OUTPATIENT)
Age: 49
End: 2024-05-06

## 2024-05-06 ENCOUNTER — OFFICE VISIT (OUTPATIENT)
Dept: OBGYN CLINIC | Facility: CLINIC | Age: 49
End: 2024-05-06
Payer: COMMERCIAL

## 2024-05-06 ENCOUNTER — NURSE TRIAGE (OUTPATIENT)
Age: 49
End: 2024-05-06

## 2024-05-06 ENCOUNTER — APPOINTMENT (OUTPATIENT)
Dept: RADIOLOGY | Facility: AMBULARY SURGERY CENTER | Age: 49
End: 2024-05-06
Attending: STUDENT IN AN ORGANIZED HEALTH CARE EDUCATION/TRAINING PROGRAM
Payer: COMMERCIAL

## 2024-05-06 VITALS
BODY MASS INDEX: 24.29 KG/M2 | DIASTOLIC BLOOD PRESSURE: 82 MMHG | RESPIRATION RATE: 16 BRPM | WEIGHT: 132 LBS | SYSTOLIC BLOOD PRESSURE: 123 MMHG | HEIGHT: 62 IN | HEART RATE: 88 BPM

## 2024-05-06 DIAGNOSIS — M75.01 ADHESIVE CAPSULITIS OF RIGHT SHOULDER: Primary | ICD-10-CM

## 2024-05-06 DIAGNOSIS — M25.511 RIGHT SHOULDER PAIN, UNSPECIFIED CHRONICITY: ICD-10-CM

## 2024-05-06 PROCEDURE — 99204 OFFICE O/P NEW MOD 45 MIN: CPT | Performed by: STUDENT IN AN ORGANIZED HEALTH CARE EDUCATION/TRAINING PROGRAM

## 2024-05-06 PROCEDURE — 20610 DRAIN/INJ JOINT/BURSA W/O US: CPT | Performed by: STUDENT IN AN ORGANIZED HEALTH CARE EDUCATION/TRAINING PROGRAM

## 2024-05-06 PROCEDURE — 73030 X-RAY EXAM OF SHOULDER: CPT

## 2024-05-06 RX ORDER — TRIAMCINOLONE ACETONIDE 40 MG/ML
40 INJECTION, SUSPENSION INTRA-ARTICULAR; INTRAMUSCULAR
Status: COMPLETED | OUTPATIENT
Start: 2024-05-06 | End: 2024-05-06

## 2024-05-06 RX ORDER — BUPIVACAINE HYDROCHLORIDE 2.5 MG/ML
4 INJECTION, SOLUTION INFILTRATION; PERINEURAL
Status: COMPLETED | OUTPATIENT
Start: 2024-05-06 | End: 2024-05-06

## 2024-05-06 RX ADMIN — TRIAMCINOLONE ACETONIDE 40 MG: 40 INJECTION, SUSPENSION INTRA-ARTICULAR; INTRAMUSCULAR at 13:00

## 2024-05-06 RX ADMIN — BUPIVACAINE HYDROCHLORIDE 4 ML: 2.5 INJECTION, SOLUTION INFILTRATION; PERINEURAL at 13:00

## 2024-05-06 NOTE — TELEPHONE ENCOUNTER
"Pt. C/o bloating, heart burn and constipation, advised to try Gavison otc as directed as she is not on any prescription medication, also advised to use Miralax 1 capful daily to aid in constipation, pt. Requesting for appointment before 5/22/24 with Dr Hughes, made f/u for 5/15/24        Reason for Disposition   Patient's symptoms are safe to treat at home per nursing judgment    Answer Assessment - Initial Assessment Questions  1. REASON FOR CALL: \"What is your main concern right now?\"          Pt. C/o worsening heart burning, bloating and constipation, asking to be seen in office sooner than June because she is leaving to travel out of country to be with her family 5/22/24    Protocols used: No Protocol Available - Sick Adult-ADULT-[place]    "

## 2024-05-06 NOTE — TELEPHONE ENCOUNTER
Patients GI provider:  Dr. Agarwal    Number to return call: [phone]    Reason for call: Pt calling to see if sooner available OV available w/doc or PA. Pt will have to fly out overseas due to family emergency and will not be able to make 06/06 apt. Would like to be seen by GI before going. Transferred to [facility] in triage for further assistance.    Scheduled procedure/appointment date if applicable: Apt 06/06/2024

## 2024-05-06 NOTE — PROGRESS NOTES
"Ortho Sports Medicine Shoulder New Patient Visit     Assesment:   48 y.o. female right shoulder ahesive capsulitis    Plan:  The patient's diagnosis and treatment were discussed at length today. We discussed no treatment, non-operative treatment, and operative treatment.    Yesenia presents today for initial evaluation right shoulder adhesive capsulitis.  I discussed that this can be treated nonoperatively with a combination of outpatient physical therapy and a cortisone injection.  She did wish to proceed forward with a cortisone injection at today's visit.  A right glenohumeral injection was performed and she tolerated procedure well.  Given her history of diabetes I discussed with her she should keep an eye on her sugar levels for the next 1 to 2 days.  I did also provide her with a new referral to outpatient physical therapy as she would benefit from stretching and range of motion exercises.  I discussed with her she should pause therapy for 1 to 2 weeks to allow her to rest.  She can continue to perform activity as tolerated using pain as a guide.  She can continue use ice and over-the-counter medication.  She can follow-up in approximately 2 months for reevaluation.  She may reach out to our office in regards to a work note for a period of rest following cortisone injection.    Large joint arthrocentesis: R glenohumeral  [facility] Protocol:  Consent: Verbal consent obtained.  Risks and benefits: risks, benefits and alternatives were discussed  Consent given by: patient  Time out: Immediately prior to procedure a \"time out\" was called to verify the correct patient, procedure, equipment, support staff and site/side marked as required.  Timeout called at: 5/6/2024 1:31 PM.  Patient understanding: patient states understanding of the procedure being performed  Patient consent: the patient's understanding of the procedure matches consent given  Site marked: the operative site was marked  Patient identity confirmed: " verbally with patient  Supporting Documentation  Indications: pain and diagnostic evaluation   Procedure Details  Location: shoulder - R glenohumeral  Preparation: Patient was prepped and draped in the usual sterile fashion  Needle size: 22 G  Ultrasound guidance: no  Approach: anterior  Medications administered: 4 mL bupivacaine 0.25 %; 40 mg triamcinolone acetonide 40 mg/mL    Patient tolerance: patient tolerated the procedure well with no immediate complications  Dressing:  Sterile dressing applied          Conservative treatment:    Ice to shoulder 1-2 times daily, for 20 minutes at a time.  Pause outpatient PT for 2 weeks and then resume outpatient PT.  Home exercise program for shoulder, including ROM and strenthening.  Instructions given to patient of what exercises to perform.  Let pain guide return to activities.      Imaging:    All imaging from today was reviewed by myself and explained to the patient.       Injection:    The risks and benefits of the injection (which include but are not limited to: infection, bleeding,damage to nerve/artery, need for further intervention), as well as the risks and benefits of all alternative treatments were explained and understood.  The patient elected to proceed with injection. The procedure was done with aseptic technique, and the patient tolerated the procedure well with no complications.  A corticosteroid injection of the glenohumeral joint was performed.      Surgery:     No surgery is recommended at this point, continue with conservative treatment plan as noted.      Follow up:    No follow-ups on file.        Chief Complaint   Patient presents with    Right Shoulder - Pain       History of Present Illness:    The patient is a 48 y.o., right hand dominant female referred to me by their primary care physician, seen in clinic for evaluation of right shoulder pain.  She states that her pain has been bothering her for approximately 2 to 3 months after she rolled over  in bed.  She states that her pain originally started on the lateral aspect of her arm, but now has radiated to the anterior aspect of her shoulder.  She describes the pain as dull and achy and rates it a 4 out of 10.  She states that reaching overhead, getting dressed, and carrying heavy objects does increase her symptoms.  She has a history of diabetes.  She denies any history of thyroid issues.  She has been in outpatient physical therapy for approximately 6 weeks without any significant improvement of her symptoms.  She has not attempted any cortisone injections.  She does also occasionally report some numbness and tingling, however she denies any issues with intrinsic motor function.    Shoulder Surgical History:  None    Past Medical, Social and Family History:  Past Medical History:   Diagnosis Date    Abdominal pain     right lower [id number] in 1/2020    Diabetes mellitus (HCC)     Iron deficiency anemia     Lab test positive for detection of COVID-19 virus 11/28/2020     Past Surgical History:   Procedure Laterality Date    COLONOSCOPY      REDUCTION MAMMAPLASTY Bilateral     11/27/19    TUBAL LIGATION      last assessed 7/10/17    UPPER GASTROINTESTINAL ENDOSCOPY       Allergies   Allergen Reactions    Pollen Extract      Current Outpatient Medications on File Prior to Visit   Medication Sig Dispense Refill    docusate sodium (COLACE) 100 mg capsule Take 1 capsule (100 mg total) by mouth in the morning 90 capsule 2    ferrous gluconate (FERGON) 240 (27 FE) MG tablet Take 1 tablet (240 mg total) by mouth in the morning 90 tablet 3    metFORMIN (GLUCOPHAGE) 500 mg tablet Take 1 tablet (500 mg total) by mouth 2 times a day with meals 180 tablet 1    multivitamin (THERAGRAN) TABS Take 1 tablet by mouth daily      semaglutide, 0.25 or 0.5 mg/dose, (Ozempic, 0.25 or 0.5 MG/DOSE,) 2 mg/3 mL injection pen Inject 0.75 mL (0.5 mg total) under the skin every 7 days 9 mL 1    simvastatin (ZOCOR) 10  mg tablet Take 1 tablet (10 mg total) by mouth daily at bedtime 90 tablet 3    diclofenac potassium (CATAFLAM) 50 mg tablet Take 1 tablet (50 mg total) by mouth 3 (three) times a day for 7 days 21 tablet 0    Plecanatide (Trulance) 3 MG TABS Take 1 tablet by mouth daily (Patient not taking: Reported on 8/29/2023) 90 tablet 1     No current facility-administered medications on file prior to visit.     Social History     Socioeconomic History    Marital status: /Civil Union     Spouse name: Not on file    Number of children: Not on file    Years of education: Not on file    Highest education level: Not on file   Occupational History    Not on file   Tobacco Use    Smoking status: Never    Smokeless tobacco: Never   Substance and Sexual Activity    Alcohol use: No    Drug use: No    Sexual activity: Yes     Partners: Male     Birth control/protection: Female Sterilization   Other Topics Concern    Not on file   Social History Narrative    Not on file     Social Determinants of Health     Financial Resource Strain: Not on file   Food Insecurity: Not on file   Transportation Needs: Not on file   Physical Activity: Not on file   Stress: Not on file   Social Connections: Not on file   Intimate Partner Violence: Not on file   Housing Stability: Not on file         I have reviewed the past medical, surgical, social and family history, medications and allergies as documented in the EMR.    Review of systems: ROS is negative other than that noted in the HPI.  Constitutional: Negative for fatigue and fever.   HENT: Negative for sore throat.    Respiratory: Negative for shortness of breath.    Cardiovascular: Negative for chest pain.   Gastrointestinal: Negative for abdominal pain.   Endocrine: Negative for cold intolerance and heat intolerance.   Genitourinary: Negative for flank pain.   Musculoskeletal: Negative for back pain.   Skin: Negative for rash.   Allergic/Immunologic: Negative for immunocompromised state.  "  Neurological: Negative for dizziness.   Psychiatric/Behavioral: Negative for agitation.      Physical Exam:    Blood pressure 123/82, pulse 88, resp. rate 16, height 5' 2\" (1.575 m), weight 59.9 kg (132 lb), not currently breastfeeding.    General/Constitutional: NAD, well developed, well nourished  HENT: Normocephalic, atraumatic  CV: Intact distal pulses, regular rate  Resp: No respiratory distress or labored breathing  Lymphatic: No lymphadenopathy palpated  Neuro: Alert and Oriented x 3, no focal deficits  Psych: Normal mood, normal affect, normal judgement, normal behavior  Skin: Warm, dry, no rashes, no erythema      Shoulder focused exam:     Visual inspection of the shoulder demonstrates normal contour without atrophy  No evidence of scapular dyskinesia or atrophy.  No scapular winging  Active and passive range of motion demonstrates forward flexion to 130, abduction to 80, external rotation is neutral with the arm the side, internal rotation to sacrum   Rotator cuff strength is 4+/5 to resisted forward flexion, 4+/5 resisted abduction, 4+/5 resisted external rotation, 5/5 resisted internal rotation  No tenderness to palpation at the distal clavicle, AC joint, acromion, or scapular spine  No pain with cross-body adduction  + Neer's test, + modified Garcia impingement test  Negative external rotation lag sign  Negative belly press, negative lift-off  - speed's and Yergason's test  - tenderness to palpation at the bicipital groove  - [place]'s test  -Jaramillo's  -Chuy's compression at wrist        UE NV Exam: +2 Radial pulses bilaterally  Sensation intact to light touch C5-T1 bilaterally, Radial/median/ulnar nerve motor intact      Bilateral elbow, wrist, and and forearm ROM full, painless with passive ROM, no ttp or crepitance throughout extremities below shoulder joint    Cervical ROM is full without pain, numbness or tingling      Shoulder Imaging    X-rays of the right shoulder were reviewed, which " demonstrate no acute osseous abnormalities or significant degenerative changes.  I have reviewed the radiology report and do not currently have a radiology reading from [facility], but will check the result once the reading is performed.      Scribe Attestation      I,:  William Greene am acting as a scribe while in the presence of the attending physician.:       I,:  Demarcus Lee, DO personally performed the services described in this documentation    as scribed in my presence.:

## 2024-05-07 ENCOUNTER — OFFICE VISIT (OUTPATIENT)
Dept: PHYSICAL THERAPY | Facility: CLINIC | Age: 49
End: 2024-05-07
Payer: COMMERCIAL

## 2024-05-07 DIAGNOSIS — M75.01 ADHESIVE CAPSULITIS OF RIGHT SHOULDER: ICD-10-CM

## 2024-05-07 DIAGNOSIS — M75.41 SHOULDER IMPINGEMENT SYNDROME, RIGHT: ICD-10-CM

## 2024-05-07 DIAGNOSIS — M77.8 SHOULDER TENDINITIS, RIGHT: Primary | ICD-10-CM

## 2024-05-07 DIAGNOSIS — M25.511 CHRONIC RIGHT SHOULDER PAIN: ICD-10-CM

## 2024-05-07 DIAGNOSIS — G89.29 CHRONIC RIGHT SHOULDER PAIN: ICD-10-CM

## 2024-05-07 PROCEDURE — 97530 THERAPEUTIC ACTIVITIES: CPT

## 2024-05-07 PROCEDURE — 97110 THERAPEUTIC EXERCISES: CPT

## 2024-05-07 PROCEDURE — 97140 MANUAL THERAPY 1/> REGIONS: CPT

## 2024-05-07 NOTE — PROGRESS NOTES
"Daily Note     Today's date: 2024  Patient name: Yesenia Booth  : 1975  MRN: [id number]  Referring provider: Liliam Goode MD  Dx:   Encounter Diagnosis     ICD-10-CM    1. Shoulder tendinitis, right  M77.8       2. Adhesive capsulitis of right shoulder  M75.01 Ambulatory referral to Physical Therapy      3. Shoulder impingement syndrome, right  M75.41       4. Chronic right shoulder pain  M25.511     G89.29                      Subjective: Pt states that she had an injection yesterday and is a little sore but feeling better today.        Objective: See treatment diary below      Assessment: Tolerated treatment well.   Session modified slightly to focus on gentle stretching and AAROM.  Pt tolerated with minimal discomfort.  Pt continues to be challenged with ER in supine due to pain.  Pt has overall decreased pain after session.   Discussed HEP with pt reporting understanding and improving compliance.  Pt will benefit from continued therapy.        Plan: Continue per plan of care.      Diagnosis:  R shoulder pain   Precautions:    Primary Goals:    *asterisks by exercise = given for HEP   Manuals    PROM  TH DK   GHJ mobs     DK, MVMs   Cervical mobs        Thoracic mobs?        IASTM        STM        There Ex        Pulleys 2' flex/scap 2' flex/scap 2' flex/scap 2' flex/scap    UT stretch        LS stretch        SCM stretch        Posterior Cap stretch   5\"x10     IR strap stretch   3\"x10 3\"x10    ABD AAROM with cane 20x 20x 20x 2\" 20x2\"    ER stretch with cane Supine @45  ABD 15x Supine  15x Supine   15x Supine   15x Supine @ 45* ABD 15x   Sleeper stretch 10x 10x 10x 10x    Wall slides Flex 20x Flex 20x Flex/abd 20x ea Flex/abd 20x ea            UBE        Neuro Re-Ed        Chin tuck  20x supine      Scap squeeze        Rows        Shoulder extension        ER        Ball circles        Serratus press  20x      S/L lower trap set  Cues 2x10                                "        Re-evaluation     DK    Ther Act         Education   HEP importance  HEP importance, POC            Modalities         Heat/ice (PRN) Ice x10 min R shlder Ice x8 min Right shoulder Ice x8 min Right shoulder Ice x8 min Right shoulder Ice x8 min Right shoulder

## 2024-05-09 ENCOUNTER — OFFICE VISIT (OUTPATIENT)
Dept: PHYSICAL THERAPY | Facility: CLINIC | Age: 49
End: 2024-05-09
Payer: COMMERCIAL

## 2024-05-09 DIAGNOSIS — M25.511 CHRONIC RIGHT SHOULDER PAIN: ICD-10-CM

## 2024-05-09 DIAGNOSIS — G89.29 CHRONIC RIGHT SHOULDER PAIN: ICD-10-CM

## 2024-05-09 DIAGNOSIS — M75.41 SHOULDER IMPINGEMENT SYNDROME, RIGHT: ICD-10-CM

## 2024-05-09 DIAGNOSIS — M77.8 SHOULDER TENDINITIS, RIGHT: ICD-10-CM

## 2024-05-09 DIAGNOSIS — M75.01 ADHESIVE CAPSULITIS OF RIGHT SHOULDER: Primary | ICD-10-CM

## 2024-05-09 PROCEDURE — 97110 THERAPEUTIC EXERCISES: CPT | Performed by: PHYSICAL THERAPIST

## 2024-05-09 PROCEDURE — 97112 NEUROMUSCULAR REEDUCATION: CPT | Performed by: PHYSICAL THERAPIST

## 2024-05-09 PROCEDURE — 97140 MANUAL THERAPY 1/> REGIONS: CPT | Performed by: PHYSICAL THERAPIST

## 2024-05-09 NOTE — PROGRESS NOTES
"Daily Note     Today's date: 2024  Patient name: Yesenia Booth  : 1975  MRN: [id number]  Referring provider: Liliam Goode MD  Dx:   Encounter Diagnosis     ICD-10-CM    1. Adhesive capsulitis of right shoulder  M75.01       2. Shoulder tendinitis, right  M77.8       3. Shoulder impingement syndrome, right  M75.41       4. Chronic right shoulder pain  M25.511     G89.29           Start Time: 1700  Stop Time: 1745  Total time in clinic (min): 45 minutes    Subjective: Patient reports she was not able to do exercises yesterday because of her daughter's graduation ceremony.      Objective: See treatment diary below      Assessment: Tolerated treatment well. Initiated session with self-stretches to patient's tolerance. Incorporated scapular strengthening exercises with cuing for proper posture and scapular muscle engagement. Some soreness reported after that relieved with rest. Some soreness noted with IASTM initially but reduced pain reported afterwards. Patient exhibited good technique with therapeutic exercises and would benefit from continued PT      Plan: Continue per plan of care.  Progress treatment as tolerated.  Possible trial EPAT next visit for Right upper arm pain.     Diagnosis:  R shoulder pain   Precautions:  DM2   Primary Goals:    *asterisks by exercise = given for HEP   Manuals    PROM  DK TH TH DK   GHJ mobs  DK   DK, MVMs   Cervical mobs        Thoracic mobs?        IASTM  DK      STM        There Ex        Pulleys 2' flex/scap  2' flex/scap 2' flex/scap    UT stretch        LS stretch        SCM stretch        Posterior Cap stretch  10\"x10 5\"x10     IR strap stretch  10\"x10 3\"x10 3\"x10    ABD AAROM with cane 20x 20x 20x 2\" 20x2\"    ER stretch with cane Supine @45  ABD 15x  Supine   15x Supine   15x Supine @ 45* ABD 15x   Sleeper stretch 10x  10x 10x    Wall slides Flex 20x  Flex/abd 20x ea Flex/abd 20x ea            UBE        Neuro Re-Ed        Chin tuck      "   Scap squeeze        Rows  RTB 2x10      Shoulder extension        ER        Ball circles  Purple x20ea      Serratus press        S/L lower trap set                                         Re-evaluation     DK    Ther Act         Education  HEP importance HEP importance  HEP importance, POC            Modalities        Heat/ice (PRN) Ice x10 min R shlder Ice x8 min Right shoulder Ice x8 min Right shoulder Ice x8 min Right shoulder Ice x8 min Right shoulder

## 2024-05-14 ENCOUNTER — OFFICE VISIT (OUTPATIENT)
Dept: PHYSICAL THERAPY | Facility: CLINIC | Age: 49
End: 2024-05-14
Payer: COMMERCIAL

## 2024-05-14 DIAGNOSIS — G89.29 CHRONIC RIGHT SHOULDER PAIN: ICD-10-CM

## 2024-05-14 DIAGNOSIS — M25.511 CHRONIC RIGHT SHOULDER PAIN: ICD-10-CM

## 2024-05-14 DIAGNOSIS — M77.8 SHOULDER TENDINITIS, RIGHT: ICD-10-CM

## 2024-05-14 DIAGNOSIS — M75.41 SHOULDER IMPINGEMENT SYNDROME, RIGHT: ICD-10-CM

## 2024-05-14 DIAGNOSIS — M75.01 ADHESIVE CAPSULITIS OF RIGHT SHOULDER: Primary | ICD-10-CM

## 2024-05-14 PROCEDURE — 97140 MANUAL THERAPY 1/> REGIONS: CPT

## 2024-05-14 PROCEDURE — 97110 THERAPEUTIC EXERCISES: CPT

## 2024-05-14 NOTE — PROGRESS NOTES
"Daily Note     Today's date: 2024  Patient name: Yesenia Booth  : 1975  MRN: [id number]  Referring provider: Liliam Goode MD  Dx:   Encounter Diagnosis     ICD-10-CM    1. Adhesive capsulitis of right shoulder  M75.01       2. Shoulder tendinitis, right  M77.8       3. Shoulder impingement syndrome, right  M75.41       4. Chronic right shoulder pain  M25.511     G89.29                      Subjective: Pt states that she is feeling better, her shoulder has been less painful.  Pt reports that she has been doing her ex's.        Objective: See treatment diary below      Assessment: Tolerated treatment well.  Continued with focus on AAROM and stretching to improve ROM.  Pt most challenged with IR stretch.  Sub scap release added this visit with tenderness noted.  PROM for all motions with improving tolerance.  Pt sore at end of session so ice applied.  Pt will benefit from continued therapy.      Plan: Continue per plan of care.      Diagnosis:  R shoulder pain   Precautions:  DM2   Primary Goals:    *asterisks by exercise = given for HEP   Manuals    PROM TH DK TH TH DK   GHJ mobs  DK   DK, MVMs   Cervical mobs        Thoracic mobs?        IASTM  DK      Subscap release   TH     [facility]        There Ex        Pulleys 2' flex/scap  2' flex/scap 2' flex/scap    UT stretch        LS stretch        SCM stretch        Posterior Cap stretch  10\"x10      IR strap stretch  10\"x10 10x10\" 3\"x10    ABD AAROM with cane 20x 20x 20x 20x2\"    ER stretch with cane Supine @45  ABD 15x   Supine   15x Supine @ 45* ABD 15x   Sleeper stretch 10x  10x5\" 10x    Wall slides Flex 20x  Flex 20x Flex/abd 20x ea            UBE        Neuro Re-Ed        Chin tuck        Scap squeeze        Rows  RTB 2x10      Shoulder extension        ER        Ball circles  Purple x20ea      Serratus press        S/L lower trap set        No moinies    AROM 15x                              Re-evaluation     DK    Ther Act         " Education  HEP importance   HEP importance, POC            Modalities        Heat/ice (PRN) Ice x10 min R shlder Ice x8 min Right shoulder Ice 8 min R shoulder Ice x8 min Right shoulder Ice x8 min Right shoulder

## 2024-05-15 ENCOUNTER — OFFICE VISIT (OUTPATIENT)
Dept: GASTROENTEROLOGY | Facility: CLINIC | Age: 49
End: 2024-05-15
Payer: COMMERCIAL

## 2024-05-15 ENCOUNTER — TELEPHONE (OUTPATIENT)
Dept: GASTROENTEROLOGY | Facility: CLINIC | Age: 49
End: 2024-05-15

## 2024-05-15 VITALS
HEART RATE: 80 BPM | OXYGEN SATURATION: 98 % | WEIGHT: 139 LBS | BODY MASS INDEX: 25.58 KG/M2 | DIASTOLIC BLOOD PRESSURE: 85 MMHG | HEIGHT: 62 IN | SYSTOLIC BLOOD PRESSURE: 115 MMHG

## 2024-05-15 DIAGNOSIS — K59.00 CONSTIPATION, UNSPECIFIED CONSTIPATION TYPE: ICD-10-CM

## 2024-05-15 DIAGNOSIS — R14.0 BLOATED ABDOMEN: ICD-10-CM

## 2024-05-15 DIAGNOSIS — D50.9 IRON DEFICIENCY ANEMIA, UNSPECIFIED IRON DEFICIENCY ANEMIA TYPE: ICD-10-CM

## 2024-05-15 DIAGNOSIS — K31.A0 GASTRIC INTESTINAL METAPLASIA: Primary | ICD-10-CM

## 2024-05-15 PROCEDURE — 99214 OFFICE O/P EST MOD 30 MIN: CPT | Performed by: INTERNAL MEDICINE

## 2024-05-15 RX ORDER — FAMOTIDINE 20 MG/1
20 TABLET, FILM COATED ORAL 2 TIMES DAILY
Qty: 180 TABLET | Refills: 0 | Status: SHIPPED | OUTPATIENT
Start: 2024-05-15 | End: 2024-08-13

## 2024-05-15 NOTE — PROGRESS NOTES
[facility]  Progress Note - Yesenia Booth 48 y.o. female MRN: [id number]    Unit/Bed#:  Encounter: [id number]    Assessment/Plan:    1.  Gastrointestinal metaplasia: Noted to have H. pylori, this was treated with Prevpac in 2021.  Patient was recommended repeat EGD in 1 to 2-year interval.  She has not had this done yet.  She was also recommended stool testing to assess for eradication of H. pylori however this has also not been done.  -Discussed with patient importance of confirming H. pylori eradication and the need for repeat EGD to assess for gastric intestinal metaplasia.  Will need to proceed with gastric mapping.  -Will obtain stool test to assess for eradication of H. pylori at this time as well.  Patient is having significant symptoms of acid reflux and does not take any acid reducing medications-, Therefore recommend at least starting on famotidine 20 mg twice daily.  Prescription sent to the pharmacy.    2.  Abdominal bloating/belching:  -Suspect may be due to H. pylori gastritis versus undiagnosed gastroparesis given patient's history of diabetes versus acute worsening and having been on Ozempic recently.  Unfortunately, patient's symptoms have remained despite having come off of Ozempic for almost a month.  -Would recommend starting on H2 blockers and confirming eradication of H. pylori.  Will plan for EGD for evaluation of gastric intestinal metaplasia.  If this is unremarkable, would recommend gastric emptying study to assess for gastroparesis.  This was ordered previously as well.  -Can trial IBgard/FDgard as well.    3.  Constipation: Reports moving bowels every 3 to 4 days, takes Trulance once in a while, reports that she prefers natural supplements instead.  -Given patient's symptoms of abdominal bloating, may have worsening symptoms with a fiber supplement therefore would recommend starting on MiraLAX 1 tablespoon on daily basis.  Patient is open to this.  -Will check  "magnesium levels with.  Thyroid was normal previously.      Subjective:     48-year-old female with history of type 2 diabetes, presents for evaluation of longstanding symptoms of abdominal pain, heartburn, abdominal bloating and constipation.  Patient reports that symptoms worsened over the past few months.  She was on Ozempic however stopped this almost 1 month ago.  The symptoms have not improved.  Patient reports that she continues to suffer from chronic constipation and moves her bowels every 3 to 4 days, has been prescribed Trulance which she only takes on as-needed basis.  She thinks that Trulance worsens her bloating.  She also does not take any PPI or H2 blocker and reports that symptoms of acid reflux are almost daily.  She denies dysphagia, loss of appetite or early satiety.  She reports taking fennel seed and cumin to help with upper GI symptoms.  She underwent EGD and colonoscopy in 2020 notable for gastric biopsies showing H. pylori organism and intestinal metaplasia.  She was recommended repeat EGD in 1 to 2-year interval.  She has not had this done yet.  She was treated for H. pylori with Prevpac.  She also underwent MRI/MRCP for dilated CBD.  This appeared to be stable at 8 mm in comparison to 2008.  There is no evidence of choledocholithiasis or biliary stricture or suspicious lesions.  Liver appeared unremarkable.  Gallbladder appeared normal.  Blood work more recently was notable for normal liver enzymes, glucose of 100, hemoglobin 10.1, platelets 473      Objective:     Vitals: Blood pressure 115/85, pulse 80, height 5' 2\" (1.575 m), weight 63 kg (139 lb), SpO2 98%, not currently breastfeeding.,Body mass index is 25.42 kg/m².    [unfilled]    Physical Exam:    GEN: wn/wd, NAD  HEENT: MMM, no cervical or supraclavicular LAD, anciteric  CV: RRR, no m/r/g  CHEST: CTA b/l, no w/r/r  ABD: +BS, soft, NT/ND, no hepatosplenomegaly  EXT: no c/c/e  SKIN: no rashes  NEURO: aaox3      Invasive Devices       " None                           Lab, Imaging and other studies:     No visits with results within 1 Day(s) from this visit.   Latest known visit with results is:   Orders Only on 01/24/2024   Component Date Value    White Blood Cell Count 01/24/2024 8.0     Red Blood Cell Count 01/24/2024 4.04     Hemoglobin 01/24/2024 10.1 (L)     HCT 01/24/2024 31.0 (L)     MCV 01/24/2024 77 (L)     MCH 01/24/2024 25.0 (L)     MCHC 01/24/2024 32.6     RDW 01/24/2024 13.5     Platelet Count 01/24/2024 473 (H)     Neutrophils 01/24/2024 59     Lymphocytes 01/24/2024 32     Monocytes 01/24/2024 6     Eosinophils 01/24/2024 2     Basophils PCT 01/24/2024 1     Neutrophils (Absolute) 01/24/2024 4.7     Lymphocytes (Absolute) 01/24/2024 2.6     Monocytes (Absolute) 01/24/2024 0.5     Eosinophils (Absolute) 01/24/2024 0.2     Basophils ABS 01/24/2024 0.0     Immature Granulocytes 01/24/2024 0     Immature Granulocytes (A* 01/24/2024 0.0     Glucose, Random 01/24/2024 100 (H)     BUN 01/24/2024 11     Creatinine 01/24/2024 0.56 (L)     eGFR 01/24/2024 111     SL AMB BUN/CREATININE RA* 01/24/2024 20     Sodium 01/24/2024 138     Potassium 01/24/2024 4.3     Chloride 01/24/2024 103     CO2 01/24/2024 22     CALCIUM 01/24/2024 9.0     Protein, Total 01/24/2024 6.8     Albumin 01/24/2024 3.9     Globulin, Total 01/24/2024 2.9     Albumin/Globulin Ratio 01/24/2024 1.3     TOTAL BILIRUBIN 01/24/2024 0.3     Alk Phos Isoenzymes 01/24/2024 61     AST 01/24/2024 10     ALT 01/24/2024 7     Cholesterol, Total 01/24/2024 196     Triglycerides 01/24/2024 92     HDL 01/24/2024 60     LDL Calculated 01/24/2024 119 (H)     Hemoglobin A1C 01/24/2024 6.6 (H)          I have personally reviewed pertinent films in PACS    No current facility-administered medications for this visit.

## 2024-05-15 NOTE — H&P (VIEW-ONLY)
[facility]  Progress Note - Yesenia Booth 48 y.o. female MRN: [id number]    Unit/Bed#:  Encounter: [id number]    Assessment/Plan:    1.  Gastrointestinal metaplasia: Noted to have H. pylori, this was treated with Prevpac in 2021.  Patient was recommended repeat EGD in 1 to 2-year interval.  She has not had this done yet.  She was also recommended stool testing to assess for eradication of H. pylori however this has also not been done.  -Discussed with patient importance of confirming H. pylori eradication and the need for repeat EGD to assess for gastric intestinal metaplasia.  Will need to proceed with gastric mapping.  -Will obtain stool test to assess for eradication of H. pylori at this time as well.  Patient is having significant symptoms of acid reflux and does not take any acid reducing medications-, Therefore recommend at least starting on famotidine 20 mg twice daily.  Prescription sent to the pharmacy.    2.  Abdominal bloating/belching:  -Suspect may be due to H. pylori gastritis versus undiagnosed gastroparesis given patient's history of diabetes versus acute worsening and having been on Ozempic recently.  Unfortunately, patient's symptoms have remained despite having come off of Ozempic for almost a month.  -Would recommend starting on H2 blockers and confirming eradication of H. pylori.  Will plan for EGD for evaluation of gastric intestinal metaplasia.  If this is unremarkable, would recommend gastric emptying study to assess for gastroparesis.  This was ordered previously as well.  -Can trial IBgard/FDgard as well.    3.  Constipation: Reports moving bowels every 3 to 4 days, takes Trulance once in a while, reports that she prefers natural supplements instead.  -Given patient's symptoms of abdominal bloating, may have worsening symptoms with a fiber supplement therefore would recommend starting on MiraLAX 1 tablespoon on daily basis.  Patient is open to this.  -Will check  "magnesium levels with.  Thyroid was normal previously.      Subjective:     48-year-old female with history of type 2 diabetes, presents for evaluation of longstanding symptoms of abdominal pain, heartburn, abdominal bloating and constipation.  Patient reports that symptoms worsened over the past few months.  She was on Ozempic however stopped this almost 1 month ago.  The symptoms have not improved.  Patient reports that she continues to suffer from chronic constipation and moves her bowels every 3 to 4 days, has been prescribed Trulance which she only takes on as-needed basis.  She thinks that Trulance worsens her bloating.  She also does not take any PPI or H2 blocker and reports that symptoms of acid reflux are almost daily.  She denies dysphagia, loss of appetite or early satiety.  She reports taking fennel seed and cumin to help with upper GI symptoms.  She underwent EGD and colonoscopy in 2020 notable for gastric biopsies showing H. pylori organism and intestinal metaplasia.  She was recommended repeat EGD in 1 to 2-year interval.  She has not had this done yet.  She was treated for H. pylori with Prevpac.  She also underwent MRI/MRCP for dilated CBD.  This appeared to be stable at 8 mm in comparison to 2008.  There is no evidence of choledocholithiasis or biliary stricture or suspicious lesions.  Liver appeared unremarkable.  Gallbladder appeared normal.  Blood work more recently was notable for normal liver enzymes, glucose of 100, hemoglobin 10.1, platelets 473      Objective:     Vitals: Blood pressure 115/85, pulse 80, height 5' 2\" (1.575 m), weight 63 kg (139 lb), SpO2 98%, not currently breastfeeding.,Body mass index is 25.42 kg/m².    [unfilled]    Physical Exam:    GEN: wn/wd, NAD  HEENT: MMM, no cervical or supraclavicular LAD, anciteric  CV: RRR, no m/r/g  CHEST: CTA b/l, no w/r/r  ABD: +BS, soft, NT/ND, no hepatosplenomegaly  EXT: no c/c/e  SKIN: no rashes  NEURO: aaox3      Invasive Devices       " None                           Lab, Imaging and other studies:     No visits with results within 1 Day(s) from this visit.   Latest known visit with results is:   Orders Only on 01/24/2024   Component Date Value    White Blood Cell Count 01/24/2024 8.0     Red Blood Cell Count 01/24/2024 4.04     Hemoglobin 01/24/2024 10.1 (L)     HCT 01/24/2024 31.0 (L)     MCV 01/24/2024 77 (L)     MCH 01/24/2024 25.0 (L)     MCHC 01/24/2024 32.6     RDW 01/24/2024 13.5     Platelet Count 01/24/2024 473 (H)     Neutrophils 01/24/2024 59     Lymphocytes 01/24/2024 32     Monocytes 01/24/2024 6     Eosinophils 01/24/2024 2     Basophils PCT 01/24/2024 1     Neutrophils (Absolute) 01/24/2024 4.7     Lymphocytes (Absolute) 01/24/2024 2.6     Monocytes (Absolute) 01/24/2024 0.5     Eosinophils (Absolute) 01/24/2024 0.2     Basophils ABS 01/24/2024 0.0     Immature Granulocytes 01/24/2024 0     Immature Granulocytes (A* 01/24/2024 0.0     Glucose, Random 01/24/2024 100 (H)     BUN 01/24/2024 11     Creatinine 01/24/2024 0.56 (L)     eGFR 01/24/2024 111     SL AMB BUN/CREATININE RA* 01/24/2024 20     Sodium 01/24/2024 138     Potassium 01/24/2024 4.3     Chloride 01/24/2024 103     CO2 01/24/2024 22     CALCIUM 01/24/2024 9.0     Protein, Total 01/24/2024 6.8     Albumin 01/24/2024 3.9     Globulin, Total 01/24/2024 2.9     Albumin/Globulin Ratio 01/24/2024 1.3     TOTAL BILIRUBIN 01/24/2024 0.3     Alk Phos Isoenzymes 01/24/2024 61     AST 01/24/2024 10     ALT 01/24/2024 7     Cholesterol, Total 01/24/2024 196     Triglycerides 01/24/2024 92     HDL 01/24/2024 60     LDL Calculated 01/24/2024 119 (H)     Hemoglobin A1C 01/24/2024 6.6 (H)          I have personally reviewed pertinent films in PACS    No current facility-administered medications for this visit.

## 2024-05-16 ENCOUNTER — OFFICE VISIT (OUTPATIENT)
Dept: PHYSICAL THERAPY | Facility: CLINIC | Age: 49
End: 2024-05-16
Payer: COMMERCIAL

## 2024-05-16 DIAGNOSIS — M25.511 CHRONIC RIGHT SHOULDER PAIN: ICD-10-CM

## 2024-05-16 DIAGNOSIS — M75.01 ADHESIVE CAPSULITIS OF RIGHT SHOULDER: Primary | ICD-10-CM

## 2024-05-16 DIAGNOSIS — G89.29 CHRONIC RIGHT SHOULDER PAIN: ICD-10-CM

## 2024-05-16 DIAGNOSIS — M75.41 SHOULDER IMPINGEMENT SYNDROME, RIGHT: ICD-10-CM

## 2024-05-16 DIAGNOSIS — M77.8 SHOULDER TENDINITIS, RIGHT: ICD-10-CM

## 2024-05-16 PROCEDURE — 97110 THERAPEUTIC EXERCISES: CPT

## 2024-05-16 PROCEDURE — 97140 MANUAL THERAPY 1/> REGIONS: CPT

## 2024-05-16 NOTE — PROGRESS NOTES
"Daily Note     Today's date: 2024  Patient name: Yesenia Booth  : 1975  MRN: [id number]  Referring provider: Liliam Goode MD  Dx:   Encounter Diagnosis     ICD-10-CM    1. Adhesive capsulitis of right shoulder  M75.01       2. Shoulder tendinitis, right  M77.8       3. Shoulder impingement syndrome, right  M75.41       4. Chronic right shoulder pain  M25.511     G89.29                      Subjective: Pt states that she is doing much better with sleeping, less pain.  She had a busy day at work and used her arm a lot and it is not as bad as before.        Objective: See treatment diary below      Assessment: Tolerated treatment well.  Continued with outlined program to focus on improving her ROM and improve scapular strength.  Pt had improved tolerance to AAROM this visit.  Able to add in tband rows and serratus press with no increased pain.  Gentle PROM with good tolerance.  Pt progressing slowly towards her goals and will benefit from continued therapy.      Plan: Continue per plan of care.      Diagnosis:  R shoulder pain   Precautions:  DM2   Primary Goals:    *asterisks by exercise = given for HEP   Manuals    PROM TH DK TH TH DK   GHJ mobs  DK   DK, MVMs   Cervical mobs        Thoracic mobs?        IASTM  DK      Subscap release   TH     STM        There Ex        Pulleys 2' flex/scap  2' flex/scap 2' flex/scap    UT stretch        LS stretch        SCM stretch        Posterior Cap stretch  10\"x10      IR strap stretch  10\"x10 10x10\" 10x10\"    ABD AAROM with cane 20x 20x 20x     ER stretch with cane Supine @45  ABD 15x    Supine @ 45* ABD 15x   Sleeper stretch 10x  10x5\" 10x5\"    Wall slides Flex 20x  Flex 20x Flex/abd 20x            UBE        Neuro Re-Ed        Chin tuck        Scap squeeze        Rows  RTB 2x10  RTB 2x10    Shoulder extension        ER        Ball circles  Purple x20ea  Purple 20x ea    Serratus press    2x10    S/L lower trap set        No moinies    AROM " 15x                              Re-evaluation     DK    Ther Act         Education  HEP importance   HEP importance, POC            Modalities        Heat/ice (PRN) Ice x10 min R shlder Ice x8 min Right shoulder Ice 8 min R shoulder Ice 8 min R shoulder Ice x8 min Right shoulder

## 2024-05-17 ENCOUNTER — ANESTHESIA (OUTPATIENT)
Dept: GASTROENTEROLOGY | Facility: AMBULARY SURGERY CENTER | Age: 49
End: 2024-05-17

## 2024-05-17 ENCOUNTER — HOSPITAL ENCOUNTER (OUTPATIENT)
Dept: GASTROENTEROLOGY | Facility: AMBULARY SURGERY CENTER | Age: 49
Setting detail: OUTPATIENT SURGERY
Discharge: HOME | End: 2024-05-17
Attending: INTERNAL MEDICINE
Payer: COMMERCIAL

## 2024-05-17 ENCOUNTER — ANESTHESIA EVENT (OUTPATIENT)
Dept: GASTROENTEROLOGY | Facility: AMBULARY SURGERY CENTER | Age: 49
End: 2024-05-17

## 2024-05-17 VITALS
WEIGHT: 133 LBS | RESPIRATION RATE: 18 BRPM | OXYGEN SATURATION: 100 % | HEART RATE: 75 BPM | TEMPERATURE: 96.8 F | HEIGHT: 62 IN | BODY MASS INDEX: 24.48 KG/M2 | DIASTOLIC BLOOD PRESSURE: 82 MMHG | SYSTOLIC BLOOD PRESSURE: 143 MMHG

## 2024-05-17 DIAGNOSIS — K31.A0 GASTRIC INTESTINAL METAPLASIA: ICD-10-CM

## 2024-05-17 PROCEDURE — 88305 TISSUE EXAM BY PATHOLOGIST: CPT | Performed by: PATHOLOGY

## 2024-05-17 PROCEDURE — 43239 EGD BIOPSY SINGLE/MULTIPLE: CPT | Performed by: INTERNAL MEDICINE

## 2024-05-17 RX ORDER — PROPOFOL 10 MG/ML
INJECTION, EMULSION INTRAVENOUS AS NEEDED
Status: DISCONTINUED | OUTPATIENT
Start: 2024-05-17 | End: 2024-05-17

## 2024-05-17 RX ORDER — SODIUM CHLORIDE, SODIUM LACTATE, POTASSIUM CHLORIDE, CALCIUM CHLORIDE 600; 310; 30; 20 MG/100ML; MG/100ML; MG/100ML; MG/100ML
INJECTION, SOLUTION INTRAVENOUS CONTINUOUS PRN
Status: DISCONTINUED | OUTPATIENT
Start: 2024-05-17 | End: 2024-05-17

## 2024-05-17 RX ORDER — FAMOTIDINE 20 MG/1
20 TABLET, FILM COATED ORAL 2 TIMES DAILY
Qty: 180 TABLET | Refills: 0 | Status: SHIPPED | OUTPATIENT
Start: 2024-05-17 | End: 2024-08-15

## 2024-05-17 RX ORDER — LIDOCAINE HYDROCHLORIDE 20 MG/ML
INJECTION, SOLUTION EPIDURAL; INFILTRATION; INTRACAUDAL; PERINEURAL AS NEEDED
Status: DISCONTINUED | OUTPATIENT
Start: 2024-05-17 | End: 2024-05-17

## 2024-05-17 RX ADMIN — PROPOFOL 20 MG: 10 INJECTION, EMULSION INTRAVENOUS at 16:05

## 2024-05-17 RX ADMIN — PROPOFOL 20 MG: 10 INJECTION, EMULSION INTRAVENOUS at 16:10

## 2024-05-17 RX ADMIN — PROPOFOL 20 MG: 10 INJECTION, EMULSION INTRAVENOUS at 16:06

## 2024-05-17 RX ADMIN — PROPOFOL 30 MG: 10 INJECTION, EMULSION INTRAVENOUS at 16:08

## 2024-05-17 RX ADMIN — PROPOFOL 20 MG: 10 INJECTION, EMULSION INTRAVENOUS at 16:04

## 2024-05-17 RX ADMIN — LIDOCAINE HYDROCHLORIDE 100 MG: 20 INJECTION, SOLUTION EPIDURAL; INFILTRATION; INTRACAUDAL at 16:03

## 2024-05-17 RX ADMIN — PROPOFOL 10 MG: 10 INJECTION, EMULSION INTRAVENOUS at 16:12

## 2024-05-17 RX ADMIN — SODIUM CHLORIDE, SODIUM LACTATE, POTASSIUM CHLORIDE, AND CALCIUM CHLORIDE: .6; .31; .03; .02 INJECTION, SOLUTION INTRAVENOUS at 15:59

## 2024-05-17 RX ADMIN — PROPOFOL 60 MG: 10 INJECTION, EMULSION INTRAVENOUS at 16:03

## 2024-05-17 NOTE — ANESTHESIA PREPROCEDURE EVALUATION
Procedure:  EGD    Relevant Problems   No relevant active problems        Physical Exam    Airway    Mallampati score: II  TM Distance: >3 FB  Neck ROM: full     Dental   No notable dental hx     Cardiovascular  Rhythm: regular, Rate: normal    Pulmonary   Breath sounds clear to auscultation    Other Findings  post-pubertal.    Anesthesia Plan  ASA Score- 2     Anesthesia Type- IV sedation with anesthesia with ASA Monitors.         Additional Monitors:     Airway Plan:            Plan Factors-Exercise tolerance (METS): >4 METS.    Chart reviewed.   Existing labs reviewed. Patient summary reviewed.    Patient is not a current smoker.              Induction- intravenous.    Postoperative Plan-         Informed Consent- Anesthetic plan and risks discussed with patient.  I personally reviewed this patient with the CRNA. Discussed and agreed on the Anesthesia Plan with the CRNA..

## 2024-05-17 NOTE — INTERVAL H&P NOTE
H&P reviewed. After examining the patient I find no changes in the patients condition since the H&P had been written.    Vitals:    05/17/24 1441   BP: 113/69   Pulse: 77   Resp: 18   Temp: (!) 96.7 °F (35.9 °C)   SpO2: 98%

## 2024-05-17 NOTE — ANESTHESIA POSTPROCEDURE EVALUATION
Post-Op Assessment Note    CV Status:  Stable  Pain Score: 0    Pain management: adequate       Mental Status:  Alert and awake   Hydration Status:  Euvolemic   PONV Controlled:  Controlled   Airway Patency:  Patent     Post Op Vitals Reviewed: Yes    No anethesia notable event occurred.    Staff: CRNA               /81 (05/17/24 1617)    Temp (!) 96.8 °F (36 °C) (05/17/24 1617)    Pulse 80 (05/17/24 1617)   Resp 18 (05/17/24 1617)    SpO2 98 % (05/17/24 1617)

## 2024-05-19 LAB
ALBUMIN/CREAT UR: 7 MG/G CREAT (ref 0–29)
BASOPHILS # BLD AUTO: 0.1 X10E3/UL (ref 0–0.2)
BASOPHILS NFR BLD AUTO: 1 %
BUN SERPL-MCNC: 10 MG/DL (ref 6–24)
BUN/CREAT SERPL: 17 (ref 9–23)
CALCIUM SERPL-MCNC: 9.7 MG/DL (ref 8.7–10.2)
CHLORIDE SERPL-SCNC: 102 MMOL/L (ref 96–106)
CHOLEST SERPL-MCNC: 204 MG/DL (ref 100–199)
CO2 SERPL-SCNC: 25 MMOL/L (ref 20–29)
CREAT SERPL-MCNC: 0.58 MG/DL (ref 0.57–1)
CREAT UR-MCNC: 87.7 MG/DL
EGFR: 110 ML/MIN/1.73
EOSINOPHIL # BLD AUTO: 0.1 X10E3/UL (ref 0–0.4)
EOSINOPHIL NFR BLD AUTO: 1 %
ERYTHROCYTE [DISTWIDTH] IN BLOOD BY AUTOMATED COUNT: 14.6 % (ref 11.7–15.4)
GLUCOSE SERPL-MCNC: 146 MG/DL (ref 70–99)
HBA1C MFR BLD: 6.9 % (ref 4.8–5.6)
HCT VFR BLD AUTO: 36.3 % (ref 34–46.6)
HCV AB S/CO SERPL IA: NON REACTIVE
HDLC SERPL-MCNC: 73 MG/DL
HGB BLD-MCNC: 11.6 G/DL (ref 11.1–15.9)
IMM GRANULOCYTES # BLD: 0 X10E3/UL (ref 0–0.1)
IMM GRANULOCYTES NFR BLD: 0 %
LDLC SERPL CALC-MCNC: 117 MG/DL (ref 0–99)
LDLC/HDLC SERPL: 1.6 RATIO (ref 0–3.2)
LYMPHOCYTES # BLD AUTO: 2.3 X10E3/UL (ref 0.7–3.1)
LYMPHOCYTES NFR BLD AUTO: 29 %
MAGNESIUM SERPL-MCNC: 1.7 MG/DL (ref 1.6–2.3)
MCH RBC QN AUTO: 25.6 PG (ref 26.6–33)
MCHC RBC AUTO-ENTMCNC: 32 G/DL (ref 31.5–35.7)
MCV RBC AUTO: 80 FL (ref 79–97)
MICROALBUMIN UR-MCNC: 5.8 UG/ML
MONOCYTES # BLD AUTO: 0.5 X10E3/UL (ref 0.1–0.9)
MONOCYTES NFR BLD AUTO: 6 %
NEUTROPHILS # BLD AUTO: 5 X10E3/UL (ref 1.4–7)
NEUTROPHILS NFR BLD AUTO: 63 %
PLATELET # BLD AUTO: 453 X10E3/UL (ref 150–450)
POTASSIUM SERPL-SCNC: 5.3 MMOL/L (ref 3.5–5.2)
RBC # BLD AUTO: 4.54 X10E6/UL (ref 3.77–5.28)
SL AMB VLDL CHOLESTEROL CALC: 14 MG/DL (ref 5–40)
SODIUM SERPL-SCNC: 139 MMOL/L (ref 134–144)
TRIGL SERPL-MCNC: 76 MG/DL (ref 0–149)
WBC # BLD AUTO: 7.9 X10E3/UL (ref 3.4–10.8)

## 2024-05-20 ENCOUNTER — TELEPHONE (OUTPATIENT)
Age: 49
End: 2024-05-20

## 2024-05-20 NOTE — TELEPHONE ENCOUNTER
Patient called and stated she had labs done that were ordered by Dr. Goode, along with a lab ordered by Dr. Agarwal on Saturday at  [facility] in [place].  Please advise if the results can be retrieved and if she will need to schedule an appointment to review results.

## 2024-05-21 ENCOUNTER — OFFICE VISIT (OUTPATIENT)
Dept: PHYSICAL THERAPY | Facility: CLINIC | Age: 49
End: 2024-05-21
Payer: COMMERCIAL

## 2024-05-21 ENCOUNTER — TELEPHONE (OUTPATIENT)
Age: 49
End: 2024-05-21

## 2024-05-21 DIAGNOSIS — M75.41 SHOULDER IMPINGEMENT SYNDROME, RIGHT: ICD-10-CM

## 2024-05-21 DIAGNOSIS — M25.511 CHRONIC RIGHT SHOULDER PAIN: ICD-10-CM

## 2024-05-21 DIAGNOSIS — M75.01 ADHESIVE CAPSULITIS OF RIGHT SHOULDER: Primary | ICD-10-CM

## 2024-05-21 DIAGNOSIS — M77.8 SHOULDER TENDINITIS, RIGHT: ICD-10-CM

## 2024-05-21 DIAGNOSIS — G89.29 CHRONIC RIGHT SHOULDER PAIN: ICD-10-CM

## 2024-05-21 PROCEDURE — 88305 TISSUE EXAM BY PATHOLOGIST: CPT | Performed by: PATHOLOGY

## 2024-05-21 PROCEDURE — 97110 THERAPEUTIC EXERCISES: CPT

## 2024-05-21 PROCEDURE — 97140 MANUAL THERAPY 1/> REGIONS: CPT

## 2024-05-21 NOTE — TELEPHONE ENCOUNTER
Patient stated Dr. Goode said she could have the nurse fill out a note to be out of work because of her arm.  Patient wants to stop and P/U a note. Dr. Goode said she will give a doctor's note.  [phone] please call the patient with any questions.

## 2024-05-21 NOTE — PROGRESS NOTES
"Daily Note     Today's date: 2024  Patient name: Yesenia Booth  : 1975  MRN: [id number]  Referring provider: Liliam Goode MD  Dx:   Encounter Diagnosis     ICD-10-CM    1. Adhesive capsulitis of right shoulder  M75.01       2. Shoulder tendinitis, right  M77.8       3. Shoulder impingement syndrome, right  M75.41       4. Chronic right shoulder pain  M25.511     G89.29                      Subjective: pt states that she is more sore today after lifting a lot of boxes at work yesterday.  She took a pain med from her doctor and felt a little better.        Objective: See treatment diary below      Assessment: Tolerated treatment well.  Continued with AAROM and stretching to improve pt's ROM, especially for IR and abduction as these were most limited this visit.  Pt notes discomfort however is able to tolerate.  Cues for form needed with tband rows and to avoid Ut compensation.  Pt progressing slowly towards her goals and will benefit from continued therapy.        Plan: Continue per plan of care.      Diagnosis:  R shoulder pain   Precautions:  DM2   Primary Goals:    *asterisks by exercise = given for HEP   Manuals    PROM TH DK TH TH TH   GHJ mobs  DK      Cervical mobs        Thoracic mobs?        IASTM  DK      Subscap release   TH  TH   STM        There Ex        Pulleys 2' flex/scap  2' flex/scap 2' flex/scap    UT stretch        LS stretch        SCM stretch        Posterior Cap stretch  10\"x10      IR strap stretch  10\"x10 10x10\" 10x10\"    ABD AAROM with cane 20x 20x 20x  20x   ER stretch with cane Supine @45  ABD 15x       Sleeper stretch 10x  10x5\" 10x5\" 10x5\"   Wall slides Flex 20x  Flex 20x Flex/abd 20x Flex/ abd 20x           UBE        Neuro Re-Ed        Chin tuck        Scap squeeze        Rows  RTB 2x10  RTB 2x10 RTB 2x10   Shoulder extension        ER        Ball circles  Purple x20ea  Purple 20x ea    Serratus press    2x10    S/L lower trap set        No moinies    " AROM 15x                              Re-evaluation         Ther Act         Education  HEP importance               Modalities        Heat/ice (PRN) Ice x10 min R shlder Ice x8 min Right shoulder Ice 8 min R shoulder Ice 8 min R shoulder defer

## 2024-05-23 ENCOUNTER — OFFICE VISIT (OUTPATIENT)
Dept: FAMILY MEDICINE CLINIC | Facility: CLINIC | Age: 49
End: 2024-05-23
Payer: COMMERCIAL

## 2024-05-23 ENCOUNTER — EVALUATION (OUTPATIENT)
Dept: PHYSICAL THERAPY | Facility: CLINIC | Age: 49
End: 2024-05-23
Payer: COMMERCIAL

## 2024-05-23 VITALS
HEART RATE: 85 BPM | OXYGEN SATURATION: 98 % | DIASTOLIC BLOOD PRESSURE: 70 MMHG | HEIGHT: 62 IN | BODY MASS INDEX: 24.48 KG/M2 | SYSTOLIC BLOOD PRESSURE: 120 MMHG | WEIGHT: 133 LBS | RESPIRATION RATE: 16 BRPM

## 2024-05-23 DIAGNOSIS — G89.29 CHRONIC RIGHT SHOULDER PAIN: ICD-10-CM

## 2024-05-23 DIAGNOSIS — M25.511 CHRONIC RIGHT SHOULDER PAIN: ICD-10-CM

## 2024-05-23 DIAGNOSIS — M77.8 SHOULDER TENDINITIS, RIGHT: ICD-10-CM

## 2024-05-23 DIAGNOSIS — Z12.4 CERVICAL CANCER SCREENING: ICD-10-CM

## 2024-05-23 DIAGNOSIS — E11.9 TYPE 2 DIABETES MELLITUS WITHOUT COMPLICATION, WITHOUT LONG-TERM CURRENT USE OF INSULIN (HCC): ICD-10-CM

## 2024-05-23 DIAGNOSIS — E78.2 MIXED HYPERLIPIDEMIA: Primary | ICD-10-CM

## 2024-05-23 DIAGNOSIS — M75.41 SHOULDER IMPINGEMENT SYNDROME, RIGHT: ICD-10-CM

## 2024-05-23 DIAGNOSIS — E11.618 ADHESIVE CAPSULITIS OF RIGHT SHOULDER ASSOCIATED WITH TYPE 2 DIABETES MELLITUS  (HCC): ICD-10-CM

## 2024-05-23 DIAGNOSIS — D50.9 IRON DEFICIENCY ANEMIA, UNSPECIFIED IRON DEFICIENCY ANEMIA TYPE: ICD-10-CM

## 2024-05-23 DIAGNOSIS — M75.01 ADHESIVE CAPSULITIS OF RIGHT SHOULDER ASSOCIATED WITH TYPE 2 DIABETES MELLITUS  (HCC): ICD-10-CM

## 2024-05-23 DIAGNOSIS — M75.01 ADHESIVE CAPSULITIS OF RIGHT SHOULDER: Primary | ICD-10-CM

## 2024-05-23 PROCEDURE — 97530 THERAPEUTIC ACTIVITIES: CPT | Performed by: PHYSICAL THERAPIST

## 2024-05-23 PROCEDURE — 99214 OFFICE O/P EST MOD 30 MIN: CPT | Performed by: FAMILY MEDICINE

## 2024-05-23 RX ORDER — PRAVASTATIN SODIUM 10 MG
10 TABLET ORAL
Qty: 100 TABLET | Refills: 3 | Status: SHIPPED | OUTPATIENT
Start: 2024-05-23

## 2024-05-23 NOTE — PROGRESS NOTES
PT Discharge    Today's date: 2024  Patient name: Yesenia Booth  : 1975  MRN: [id number]  Referring provider: Liliam Goode MD  Dx:   Encounter Diagnosis     ICD-10-CM    1. Adhesive capsulitis of right shoulder  M75.01       2. Shoulder tendinitis, right  M77.8       3. Shoulder impingement syndrome, right  M75.41       4. Chronic right shoulder pain  M25.511     G89.29                 Start Time: 1700  Stop Time: 1730  Total time in clinic (min): 30 minutes    Assessment  Symptom irritability: moderate    Assessment details: Yesenia Booth is a pleasant 48 y.o. female who presents with R shoulder pain and impingement. She presents for re-evaluation today with improvements in pain levels noted overall, especially since injection about few weeks ago. She reports about 50-60% improvements and progress with functional goals since start of PT. Please note that start of PT was delayed since IE beginning of March due to delay in insurance auth coverage. This impacts patient's overall consistency and progress with reaping max benefits from PT. Noted slight improvements in Right shoulder ROM and Right UE strength on re-evaluation today. She reports improved overall function of Right UE since start of PT and injection. She continues to have pain and end-range especially flexion and abduction, with pain noted with MMT of shoulder abduction and external rotators primarily. Improve reaching behind head but continues to have some limitations with reaching behind back. Noted slightly improved shoulder mobility and UE strength. Noted some relief with PT exercises and ice pack post activities. The patient's greatest concerns are worry over not knowing what's wrong, fear of not being able to keep active, and future ill health (and wanting to prevent it).      No further referral appears necessary at this time based upon examination results.    Primary movement impairment diagnosis of poor postural awareness and strength  resulting in pathoanatomical symptoms of decreased ROM and limiting her ability to care for self, dress independently, exercise or recreation, go to work, lift, perform household chores, perform yard work, reach overhead, turn to look over shoulder, and wash behind the back.    Primary Impairments:  1) shoulder ROM  2) postural and shoulder strength    She will be discharged at this time to [facility] as [place]. She was educated on emphasis and benefits of keeping up with HEP to maintain PT gains made thus far.     Understanding of Dx/Px/POC: good     Prognosis: good  Prognosis details: Positive prognostic indicators include positive attitude toward recovery and good understanding of diagnosis and treatment plan options.  Negative prognostic indicators include chronicity of symptoms, high symptom irritability, minimal changes in pain with movement, and multiple concurrent orthopedic problems.      Goals  Impairment Goals 4-6 weeks  - Decrease pain to 2/10. Partially Met  - Improve shoulder AROM to equal to the unaffected upper extremity. Partially Met  - Increase shoulder strength to 5/5 throughout. Partially Met  - Increase scapular strength to 5/5 throughout. Partially Met    Functional Goals 6-8 weeks  - Return to Prior Level of Function. Partially Met  - Patient will be independent with HEP. Slightly Improved, frequent reminders required on importance of HEP  - Patient will be able to reach overhead without increased pain/compensation/difficulty. Met  - Patient will be able to reach behind back without increased pain/compensation/difficulty . Partially Met  - Patient will be able to wash hair without increased pain/compensation/difficulty . Partially Met  - Patient will be able to don/doff shirt/jacket without increased pain/compensation/difficulty. Partially Met  - Patient will be able to lift >30 pounds with proper mechanics . Partially Met        Plan  Planned modality  interventions: Modalities PRN.    Frequency: 1x week  Duration in weeks: 1  Treatment plan discussed with: patient and PTA        Subjective Evaluation    History of Present Illness  Mechanism of injury: WORK/SCHOOL: , standing, mixing, chopping, lifting, paperwork, computer work, cooking  HOME LIFE: with family  HOBBIES/EXERCISE: TV, relaxing,   PLOF:  no other noteworthy injuries  HISTORY OF CURRENT INJURY:  Approx 3 months ago, pt worked to move in her bed and when she did, she had a sharp pain that went down her arm.  It is intermittent.  The sharp pain can stay for a few minutes. Pt is R hand dominant  PAIN LOCATION/DESCRIPTORS: lateral aspect of proximal UE, radiates down radial distribution  AGGRAVATING FACTORS:  reaching overhead, dressing, reaching behind back, lifting, chopping/cutting, mixing,   EASES: nothing to note,   DAY PATTERN: morning can be stiffer due to sleeping on R side  IMAGING:  no imaging done  Mujda denies a new onset of Bladder incontinence, Bowel dysfunction, Dizziness, Dysphagia, Dysarthria, Drop attacks, Diplopia, Nausea, Ataxia, Recent unexplained weight loss, Clumsy or unsteadiness, Constant night pain, History of cancer, Tingling, Numbness, and Saddle anesthesia .  PATIENT GOALS: feel better,     Patient Goals  Patient goals for therapy: decreased pain, increased motion, return to work, return to sport/leisure activities, independence with ADLs/IADLs, increased strength and decreased edema    Pain  Current pain ratin  At best pain ratin  At worst pain ratin  Quality: radiating, sharp, dull ache and discomfort (soreness)  Relieving factors: rest  Aggravating factors: keyboarding, overhead activity and lifting          Objective     Postural Observations  Seated posture: fair  Standing posture: fair      Palpation   Left   No palpable tenderness to the anterior deltoid, biceps, infraspinatus, latissimus, levator scapulae, lower trapezius, middle deltoid,  pectoralis major, pectoralis minor, posterior deltoid, rhomboids, thoracic paraspinals and triceps.   Tenderness of the middle trapezius and upper trapezius.   Trigger point to thoracic paraspinals.     Right   No palpable tenderness to the infraspinatus, latissimus, pectoralis major and posterior deltoid.   Tenderness of the anterior deltoid, biceps, levator scapulae, lower trapezius, middle deltoid, middle trapezius, pectoralis minor, rhomboids, thoracic paraspinals and upper trapezius.   Trigger point to lower trapezius.     Cervical/Thoracic Screen   Cervical range of motion within normal limits with the following exceptions: Tightness noted within UT with side bending and rotation to L  Cervical hypomobility noted through downglides L>R  Thoracic range of motion within normal limits with the following exceptions: Assess PA future visit    Neurological Testing     Additional Neurological Details  No N/T to note    Active Range of Motion   Left Shoulder   Flexion: WFL  Abduction: WFL  External rotation BTH: WFL  Internal rotation BTB: WFL    Right Shoulder   Flexion: 150 degrees with pain  Abduction: 140 degrees with pain  External rotation 0°: WFL  External rotation BTH: T2   Internal rotation 0°: WFL  Internal rotation BTB: sacrum with pain    Passive Range of Motion     Additional Passive Range of Motion Details  Equal to AROM due to gurading    Strength/Myotome Testing     Left Shoulder     Planes of Motion   Flexion: 4   Abduction: 4   External rotation at 0°: 4+   Internal rotation at 0°: 4+     Right Shoulder     Planes of Motion   Flexion: 4-   Abduction: 4-   External rotation at 0°: 4-   Internal rotation at 0°: 4     Isolated Muscles   Rhomboids: 4-   Serratus anterior: 4     Additional Strength Details  Pt unable to maintain testing position for LT strength testing    Tests     Left Shoulder   Negative empty can, Hawkin's, Neer's and painful arc.     Right Shoulder   Positive empty can, Hawkin's and  "Neer's.   Negative painful arc.                Diagnosis:  R shoulder pain   Precautions:  DM2   Primary Goals:    *asterisks by exercise = given for HEP   Manuals 5/23 5/9 5/14 5/16 5/21   PROM  DK TH TH TH   GHJ mobs  DK      Cervical mobs        Thoracic mobs?        IASTM  DK      Subscap release   TH TH   STM        There Ex        Pulleys   2' flex/scap 2' flex/scap    UT stretch        LS stretch        SCM stretch        Posterior Cap stretch  10\"x10      IR strap stretch  10\"x10 10x10\" 10x10\"    ABD AAROM with cane  20x 20x  20x   ER stretch with cane        Sleeper stretch   10x5\" 10x5\" 10x5\"   Wall slides   Flex 20x Flex/abd 20x Flex/ abd 20x           UBE        Neuro Re-Ed        Chin tuck        Scap squeeze        Rows  RTB 2x10  RTB 2x10 RTB 2x10   Shoulder extension        ER        Ball circles  Purple x20ea  Purple 20x ea    Serratus press    2x10    S/L lower trap set        No moinies    AROM 15x                              Re-evaluation DK, D/C        Ther Act         Education  HEP importance               Modalities        Heat/ice (PRN)  Ice x8 min Right shoulder Ice 8 min R shoulder Ice 8 min R shoulder defer                                                              "

## 2024-05-23 NOTE — ASSESSMENT & PLAN NOTE
Cholesterol remains elevated discussed with her and start pravastatin low-dose and continue low-fat diet

## 2024-05-23 NOTE — PROGRESS NOTES
Ambulatory Visit  Name: Yesenia Booth      : 1975      MRN: [id number]  Encounter Provider: Liliam Goode MD  Encounter Date: 2024   Encounter department: [facility]    Assessment & Plan   1. Mixed hyperlipidemia  Assessment & Plan:  Cholesterol remains elevated discussed with her and start pravastatin low-dose and continue low-fat diet  Orders:  -     pravastatin (PRAVACHOL) 10 mg tablet; Take 1 tablet (10 mg total) by mouth daily at bedtime  -     Albumin / creatinine urine ratio; Future; Expected date: 2024  -     Comprehensive metabolic panel; Future; Expected date: 2024  -     Hemoglobin A1C; Future; Expected date: 2024  -     TSH, 3rd generation with Free T4 reflex; Future; Expected date: 2024  -     Lipid Panel with Direct LDL reflex; Future; Expected date: 2024  -     CBC and Platelet; Future; Expected date: 2024  2. Cervical cancer screening  -     Ambulatory Referral to Obstetrics / Gynecology; Future  3. Type 2 diabetes mellitus without complication, without long-term current use of insulin (HCC)  Assessment & Plan:    Lab Results   Component Value Date    HGBA1C 6.9 (H) 2024   Diabetes slightly worse could be due to the steroid injection, discussed with her to continue metformin and Januvia, advised to continue low-carb diet  Check labs in 4 months  4. Adhesive capsulitis of right shoulder associated with type 2 diabetes mellitus  (HCC)  Assessment & Plan:  She has painful range of right shoulder movements and she got injection of steroid in the right shoulder, by the orthopedic and not doing physical therapy, at her work she has to lift weight and sometimes she feels weak in the right shoulder and excuse given for 1 week so she can continue physical therapy and the avoiding lifting weights at work  Follow-up in 4 months for the diabetes follow-up  Lab Results   Component Value Date    HGBA1C 6.9 (H) 2024     5. Iron  deficiency anemia, unspecified iron deficiency anemia type  Assessment & Plan:  Hemoglobin improved, continue iron, she takes it intermittently few times a week, continue same  Orders:  -     CBC and Platelet; Future; Expected date: 09/20/2024       History of Present Illness     Follow-up on right arm she has been diagnosed right shoulder adhesive capsulitis, 2 weeks ago she got injection in the right shoulder, she is not doing physical therapy she wanted physical therapy before the steroid injection given by the orthopedic, she works as a  and she says she has to lift 30 to 40 pounds and sometimes she feels she might drop the things, she needs at least 1 week excuse for work so she can give rest to her shoulder and keep doing physical therapy school will be close on June 6.  She denies any chest pain or shortness of breath,  She is diabetic taking metformin and Januvia, he tries to eat healthy, her potassium sometimes goes up and then back to normal she likes to eat nuts at least twice a week      Review of Systems   Constitutional:  Negative for activity change, appetite change, chills, fatigue, fever and unexpected weight change.   HENT:  Negative for congestion, ear discharge, ear pain, nosebleeds, postnasal drip, rhinorrhea, sinus pressure, sneezing, sore throat, trouble swallowing and voice change.    Eyes:  Negative for photophobia, pain, discharge, redness and itching.   Respiratory:  Negative for cough, chest tightness, shortness of breath and wheezing.    Cardiovascular:  Negative for chest pain, palpitations and leg swelling.   Gastrointestinal:  Negative for abdominal pain, constipation, diarrhea, nausea and vomiting.   Endocrine: Negative for polyuria.   Genitourinary:  Negative for dysuria, frequency and urgency.   Musculoskeletal:  Positive for arthralgias. Negative for back pain, myalgias, neck pain and neck stiffness.        Right shoulder chronic pain to lift the arm above shoulder and  cannot completely touch her back   Skin:  Negative for color change, pallor and rash.   Allergic/Immunologic: Negative for environmental allergies and food allergies.   Neurological:  Negative for dizziness, weakness, light-headedness and headaches.   Hematological:  Negative for adenopathy. Does not bruise/bleed easily.   Psychiatric/Behavioral:  Negative for behavioral problems. The patient is not nervous/anxious.      Past Medical History:   Diagnosis Date   • Abdominal pain     right lower [id number] in 1/2020   • Diabetes mellitus (HCC)    • Iron deficiency anemia    • Lab test positive for detection of COVID-19 virus 11/28/2020     Past Surgical History:   Procedure Laterality Date   • COLONOSCOPY     • EGD AND COLONOSCOPY  02/05/2021   • REDUCTION MAMMAPLASTY Bilateral     11/27/19   • TUBAL LIGATION      last assessed 7/10/17   • UPPER GASTROINTESTINAL ENDOSCOPY       Family History   Problem Relation Age of Onset   • Diabetes Mother    • Hypertension Mother    • Heart disease Father         myocardial infarction   • Diabetes Sister    • Diabetes Maternal Grandmother    • Hypertension Maternal Grandmother    • No Known Problems Daughter    • No Known Problems Maternal Grandfather    • No Known Problems Paternal Grandmother    • No Known Problems Paternal Grandfather    • No Known Problems Sister    • No Known Problems Sister    • Leukemia Brother    • No Known Problems Brother    • No Known Problems Brother    • No Known Problems Brother    • No Known Problems Daughter    • No Known Problems Son    • No Known Problems Son    • No Known Problems Maternal Aunt    • No Known Problems Maternal Aunt    • No Known Problems Maternal Aunt    • No Known Problems Paternal Aunt    • No Known Problems Paternal Aunt    • No Known Problems Paternal Aunt    • No Known Problems Paternal Uncle    • No Known Problems Maternal Uncle    • No Known Problems Maternal Uncle    • No Known Problems Maternal Uncle   "  • Breast cancer Cousin 35        Maternal   • No Known Problems Brother    • Leukemia Other    • Substance Abuse Neg Hx    • Mental illness Neg Hx      Social History     Tobacco Use   • Smoking status: Never   • Smokeless tobacco: Never   Vaping Use   • Vaping status: Never Used   Substance and Sexual Activity   • Alcohol use: No   • Drug use: No   • Sexual activity: Yes     Partners: Male     Birth control/protection: Female Sterilization     Current Outpatient Medications on File Prior to Visit   Medication Sig   • docusate sodium (COLACE) 100 mg capsule Take 1 capsule (100 mg total) by mouth in the morning (Patient taking differently: Take 100 mg by mouth in the morning As needed)   • famotidine (PEPCID) 20 mg tablet Take 1 tablet (20 mg total) by mouth 2 (two) times a day   • famotidine (PEPCID) 20 mg tablet Take 1 tablet (20 mg total) by mouth 2 (two) times a day   • metFORMIN (GLUCOPHAGE) 500 mg tablet Take 1 tablet (500 mg total) by mouth 2 times a day with meals   • multivitamin (THERAGRAN) TABS Take 1 tablet by mouth daily   • sitaGLIPtin (JANUVIA) 25 mg tablet Take 25 mg by mouth daily     Allergies   Allergen Reactions   • Pollen Extract      Immunization History   Administered Date(s) Administered   • COVID-19 PFIZER VACCINE 0.3 ML IM 04/30/2021, 05/21/2021   • Meningococcal MCV4P 10/30/2009   • Pneumococcal Polysaccharide PPV23 02/14/2014   • Tdap 01/20/2020     Objective     /70   Pulse 85   Resp 16   Ht 5' 2\" (1.575 m)   Wt 60.3 kg (133 lb)   LMP  (LMP Unknown)   SpO2 98%   BMI 24.33 kg/m²     Physical Exam  Vitals and nursing note reviewed.   Constitutional:       General: She is not in acute distress.     Appearance: Normal appearance. She is not ill-appearing.   HENT:      Head: Normocephalic and atraumatic.      Right Ear: Tympanic membrane and ear canal normal.      Left Ear: Tympanic membrane and ear canal normal.      Nose: Nose normal. No congestion or rhinorrhea.      " Mouth/Throat:      Mouth: Mucous membranes are moist.      Pharynx: Oropharynx is clear. No oropharyngeal exudate or posterior oropharyngeal erythema.   Eyes:      General: No scleral icterus.        Right eye: No discharge.         Left eye: No discharge.      Extraocular Movements: Extraocular movements intact.      Conjunctiva/sclera: Conjunctivae normal.      Pupils: Pupils are equal, round, and reactive to light.   Cardiovascular:      Rate and Rhythm: Normal rate and regular rhythm.      Heart sounds: Normal heart sounds. No murmur heard.  Pulmonary:      Effort: Pulmonary effort is normal.      Breath sounds: Normal breath sounds. No wheezing or rales.   Abdominal:      General: Abdomen is flat. Bowel sounds are normal. There is no distension.      Palpations: Abdomen is soft. There is no mass.      Tenderness: There is no abdominal tenderness.   Musculoskeletal:         General: No swelling, tenderness or deformity.      Cervical back: Normal range of motion and neck supple. No muscular tenderness.      Right lower leg: No edema.      Left lower leg: No edema.      Comments: Empty can test positive in right arm Apley scratch is positive, she has limited range of motion in the right shoulder   Lymphadenopathy:      Cervical: No cervical adenopathy.   Skin:     Coloration: Skin is not jaundiced or pale.      Findings: No erythema, lesion or rash.   Neurological:      General: No focal deficit present.      Mental Status: She is alert and oriented to person, place, and time.      Gait: Gait normal.   Psychiatric:         Mood and Affect: Mood normal.         Behavior: Behavior normal.       Administrative Statements

## 2024-05-23 NOTE — ASSESSMENT & PLAN NOTE
She has painful range of right shoulder movements and she got injection of steroid in the right shoulder, by the orthopedic and not doing physical therapy, at her work she has to lift weight and sometimes she feels weak in the right shoulder and excuse given for 1 week so she can continue physical therapy and the avoiding lifting weights at work  Follow-up in 4 months for the diabetes follow-up  Lab Results   Component Value Date    HGBA1C 6.9 (H) 05/18/2024

## 2024-05-23 NOTE — ASSESSMENT & PLAN NOTE
Lab Results   Component Value Date    HGBA1C 6.9 (H) 05/18/2024   Diabetes slightly worse could be due to the steroid injection, discussed with her to continue metformin and Januvia, advised to continue low-carb diet  Check labs in 4 months

## 2024-05-28 ENCOUNTER — APPOINTMENT (OUTPATIENT)
Dept: PHYSICAL THERAPY | Facility: CLINIC | Age: 49
End: 2024-05-28
Payer: COMMERCIAL

## 2024-06-22 PROBLEM — Z12.4 CERVICAL CANCER SCREENING: Status: RESOLVED | Noted: 2024-05-23 | Resolved: 2024-06-22

## 2024-08-12 ENCOUNTER — APPOINTMENT (OUTPATIENT)
Dept: LAB | Facility: CLINIC | Age: 49
End: 2024-08-12
Payer: COMMERCIAL

## 2024-08-12 DIAGNOSIS — R14.0 BLOATED ABDOMEN: ICD-10-CM

## 2024-08-12 PROCEDURE — 87338 HPYLORI STOOL AG IA: CPT

## 2024-08-15 LAB — H PYLORI AG STL QL IA: NEGATIVE

## 2024-09-20 ENCOUNTER — APPOINTMENT (OUTPATIENT)
Dept: LAB | Facility: CLINIC | Age: 49
End: 2024-09-20
Payer: COMMERCIAL

## 2024-09-20 DIAGNOSIS — E78.2 MIXED HYPERLIPIDEMIA: ICD-10-CM

## 2024-09-20 DIAGNOSIS — R14.0 BLOATED ABDOMEN: ICD-10-CM

## 2024-09-20 DIAGNOSIS — E11.9 TYPE 2 DIABETES MELLITUS WITHOUT COMPLICATION, WITHOUT LONG-TERM CURRENT USE OF INSULIN (HCC): ICD-10-CM

## 2024-09-20 DIAGNOSIS — D50.9 IRON DEFICIENCY ANEMIA, UNSPECIFIED IRON DEFICIENCY ANEMIA TYPE: ICD-10-CM

## 2024-09-20 DIAGNOSIS — Z11.59 NEED FOR HEPATITIS C SCREENING TEST: ICD-10-CM

## 2024-09-20 LAB
ALBUMIN SERPL BCG-MCNC: 3.8 G/DL (ref 3.5–5)
ALP SERPL-CCNC: 55 U/L (ref 34–104)
ALT SERPL W P-5'-P-CCNC: 9 U/L (ref 7–52)
ANION GAP SERPL CALCULATED.3IONS-SCNC: 5 MMOL/L (ref 4–13)
AST SERPL W P-5'-P-CCNC: 12 U/L (ref 13–39)
BILIRUB SERPL-MCNC: 0.39 MG/DL (ref 0.2–1)
BUN SERPL-MCNC: 8 MG/DL (ref 5–25)
CALCIUM SERPL-MCNC: 8.7 MG/DL (ref 8.4–10.2)
CHLORIDE SERPL-SCNC: 106 MMOL/L (ref 96–108)
CHOLEST SERPL-MCNC: 182 MG/DL
CO2 SERPL-SCNC: 27 MMOL/L (ref 21–32)
CREAT SERPL-MCNC: 0.58 MG/DL (ref 0.6–1.3)
CREAT UR-MCNC: 91.5 MG/DL
ERYTHROCYTE [DISTWIDTH] IN BLOOD BY AUTOMATED COUNT: 17.2 % (ref 11.6–15.1)
EST. AVERAGE GLUCOSE BLD GHB EST-MCNC: 146 MG/DL
GFR SERPL CREATININE-BSD FRML MDRD: 107 ML/MIN/1.73SQ M
GLUCOSE P FAST SERPL-MCNC: 104 MG/DL (ref 65–99)
HBA1C MFR BLD: 6.7 %
HCT VFR BLD AUTO: 32.6 % (ref 34.8–46.1)
HCV AB SER QL: NORMAL
HDLC SERPL-MCNC: 58 MG/DL
HGB BLD-MCNC: 10 G/DL (ref 11.5–15.4)
LDLC SERPL CALC-MCNC: 94 MG/DL (ref 0–100)
MAGNESIUM SERPL-MCNC: 1.8 MG/DL (ref 1.9–2.7)
MCH RBC QN AUTO: 23.9 PG (ref 26.8–34.3)
MCHC RBC AUTO-ENTMCNC: 30.7 G/DL (ref 31.4–37.4)
MCV RBC AUTO: 78 FL (ref 82–98)
MICROALBUMIN UR-MCNC: 12.5 MG/L
MICROALBUMIN/CREAT 24H UR: 14 MG/G CREATININE (ref 0–30)
PLATELET # BLD AUTO: 459 THOUSANDS/UL (ref 149–390)
PMV BLD AUTO: 9 FL (ref 8.9–12.7)
POTASSIUM SERPL-SCNC: 4.2 MMOL/L (ref 3.5–5.3)
PROT SERPL-MCNC: 7.3 G/DL (ref 6.4–8.4)
RBC # BLD AUTO: 4.18 MILLION/UL (ref 3.81–5.12)
SODIUM SERPL-SCNC: 138 MMOL/L (ref 135–147)
TRIGL SERPL-MCNC: 151 MG/DL
TSH SERPL DL<=0.05 MIU/L-ACNC: 2.25 UIU/ML (ref 0.45–4.5)
WBC # BLD AUTO: 7.88 THOUSAND/UL (ref 4.31–10.16)

## 2024-09-20 PROCEDURE — 86803 HEPATITIS C AB TEST: CPT

## 2024-09-20 PROCEDURE — 82043 UR ALBUMIN QUANTITATIVE: CPT

## 2024-09-20 PROCEDURE — 83735 ASSAY OF MAGNESIUM: CPT

## 2024-09-20 PROCEDURE — 85027 COMPLETE CBC AUTOMATED: CPT

## 2024-09-20 PROCEDURE — 36415 COLL VENOUS BLD VENIPUNCTURE: CPT

## 2024-09-20 PROCEDURE — 83036 HEMOGLOBIN GLYCOSYLATED A1C: CPT

## 2024-09-20 PROCEDURE — 80053 COMPREHEN METABOLIC PANEL: CPT

## 2024-09-20 PROCEDURE — 80061 LIPID PANEL: CPT

## 2024-09-20 PROCEDURE — 84443 ASSAY THYROID STIM HORMONE: CPT

## 2024-09-20 PROCEDURE — 82570 ASSAY OF URINE CREATININE: CPT

## 2024-09-23 ENCOUNTER — TELEPHONE (OUTPATIENT)
Dept: FAMILY MEDICINE CLINIC | Facility: CLINIC | Age: 49
End: 2024-09-23

## 2024-09-23 NOTE — TELEPHONE ENCOUNTER
----- Message from Liliam Goode MD sent at 9/23/2024  1:06 PM EDT -----  Patient needs follow-up appointment, has  Abnormal blood work

## 2024-09-26 ENCOUNTER — TELEPHONE (OUTPATIENT)
Dept: GASTROENTEROLOGY | Facility: AMBULARY SURGERY CENTER | Age: 49
End: 2024-09-26

## 2024-09-26 NOTE — TELEPHONE ENCOUNTER
----- Message from Sheryl Agarwal MD sent at 9/24/2024  7:19 AM EDT -----  Good morning,  Your vitamin D levels are borderline, can hold off on any additional supplements at this time.  Can supplement with magnesium rich foods such as avocados, kiwi, broccoli, green leafy vegetables, cauliflower etc.  Thank you  Dr. Agarwal

## 2024-09-26 NOTE — TELEPHONE ENCOUNTER
Attempted to call patient with lab results and provider recommendation, however, I received voicemail. Advised to return call to office to discuss.    Please, relay results if patient returns call. Thank you!

## 2024-09-27 NOTE — TELEPHONE ENCOUNTER
Attempted to return call with patients lab results and provider recommendation, however, I received voicemail. Advised to return call to office to discuss.    Please, relay results to patient when she returns call.

## 2024-09-30 ENCOUNTER — TELEPHONE (OUTPATIENT)
Dept: GASTROENTEROLOGY | Facility: AMBULARY SURGERY CENTER | Age: 49
End: 2024-09-30

## 2024-10-17 ENCOUNTER — TELEPHONE (OUTPATIENT)
Age: 49
End: 2024-10-17

## 2024-10-17 NOTE — TELEPHONE ENCOUNTER
Spoke with pt and advised that the lab results from last month will be discussed next week at her appointment and if more labs need to be ordered Dr Goode will do so at the appointment

## 2024-10-24 ENCOUNTER — TELEPHONE (OUTPATIENT)
Dept: FAMILY MEDICINE CLINIC | Facility: CLINIC | Age: 49
End: 2024-10-24

## 2024-10-24 ENCOUNTER — OFFICE VISIT (OUTPATIENT)
Dept: FAMILY MEDICINE CLINIC | Facility: CLINIC | Age: 49
End: 2024-10-24

## 2024-10-24 VITALS
SYSTOLIC BLOOD PRESSURE: 120 MMHG | HEIGHT: 62 IN | RESPIRATION RATE: 16 BRPM | HEART RATE: 87 BPM | BODY MASS INDEX: 26.5 KG/M2 | WEIGHT: 144 LBS | DIASTOLIC BLOOD PRESSURE: 70 MMHG | OXYGEN SATURATION: 98 %

## 2024-10-24 DIAGNOSIS — E78.2 MIXED HYPERLIPIDEMIA: ICD-10-CM

## 2024-10-24 DIAGNOSIS — R79.0 LOW FERRITIN: Primary | ICD-10-CM

## 2024-10-24 DIAGNOSIS — D50.9 IRON DEFICIENCY ANEMIA, UNSPECIFIED IRON DEFICIENCY ANEMIA TYPE: ICD-10-CM

## 2024-10-24 DIAGNOSIS — E11.9 TYPE 2 DIABETES MELLITUS WITHOUT COMPLICATION, WITHOUT LONG-TERM CURRENT USE OF INSULIN (HCC): ICD-10-CM

## 2024-10-24 DIAGNOSIS — Z12.4 CERVICAL CANCER SCREENING: ICD-10-CM

## 2024-10-24 LAB
LEFT EYE DIABETIC RETINOPATHY: NORMAL
LEFT EYE IMAGE QUALITY: NORMAL
LEFT EYE MACULAR EDEMA: NORMAL
LEFT EYE OTHER RETINOPATHY: NORMAL
RIGHT EYE DIABETIC RETINOPATHY: NORMAL
RIGHT EYE IMAGE QUALITY: NORMAL
RIGHT EYE MACULAR EDEMA: NORMAL
RIGHT EYE OTHER RETINOPATHY: NORMAL
SEVERITY (EYE EXAM): NORMAL

## 2024-10-24 NOTE — ASSESSMENT & PLAN NOTE
Iron not well-tolerated due to constipation, advised to get IV iron infusions  Orders:    Ambulatory Referral to Hematology / Oncology; Future    CBC and Platelet; Future

## 2024-10-24 NOTE — TELEPHONE ENCOUNTER
----- Message from Liliam Goode MD sent at 10/24/2024  5:30 PM EDT -----  Please inform the patient diabetic eye exam is normal

## 2024-10-24 NOTE — PROGRESS NOTES
Ambulatory Visit  Name: Yesenia Booth      : 1975      MRN: [id number]  Encounter Provider: Liliam Goode MD  Encounter Date: 10/24/2024   Encounter department: [facility]    Assessment & Plan  Low ferritin  She does not take iron regularly as she gets constipation problem, advised to see the hematologist, she feels fatigued all the time  Orders:    Ambulatory Referral to Hematology / Oncology; Future    CBC and Platelet; Future    Iron deficiency anemia, unspecified iron deficiency anemia type  Iron not well-tolerated due to constipation, advised to get IV iron infusions  Orders:    Ambulatory Referral to Hematology / Oncology; Future    CBC and Platelet; Future    Cervical cancer screening    Orders:    Ambulatory Referral to Obstetrics / Gynecology; Future    Type 2 diabetes mellitus without complication, without long-term current use of insulin (HCC)  She does not take Januvia and metformin is working fine A1c is stable, continue metformin  Lab Results   Component Value Date    HGBA1C 6.7 (H) 2024       Orders:    IR[facility] Diabetic eye exam    Diabetic foot exam; Future    Comprehensive metabolic panel; Future    Hemoglobin A1C; Future    metFORMIN (GLUCOPHAGE) 500 mg tablet; Take 1 tablet (500 mg total) by mouth 2 (two) times a day with meals    Mixed hyperlipidemia    Orders:    Lipid Panel with Direct LDL reflex; Future     Diabetic Foot Exam    Patient's shoes and socks removed.    Right Foot/Ankle   Right Foot Inspection  Skin Exam: skin normal. Skin not intact, no dry skin, no warmth, no callus, no erythema, no maceration, no abnormal color, no pre-ulcer, no ulcer and no callus.     Toe Exam: ROM and strength within normal limits.     Sensory   Vibration: intact  Proprioception: intact  Monofilament testing: intact    Vascular  Capillary refills: < 3 seconds  The right DP pulse is 2+.     Left Foot/Ankle  Left Foot Inspection  Skin Exam: skin normal. Skin not intact, no dry  skin, no warmth, no erythema, no maceration, normal color, no pre-ulcer, no ulcer and no callus.     Toe Exam: ROM and strength within normal limits.     Sensory   Vibration: intact  Proprioception: intact  Monofilament testing: intact    Vascular  Capillary refills: < 3 seconds  The left DP pulse is 2+.     Assign Risk Category  No deformity present  No loss of protective sensation  No weak pulses  Risk: 0      History of Present Illness     Follow-up on diabetes, she had eye exam last year,  She is taking metformin twice a day but she does not like to take Januvia, there is no side effect but she says she want to take less medication and she will keep working on diet and exercise      Review of Systems   Constitutional:  Positive for fatigue. Negative for activity change, appetite change, chills, fever and unexpected weight change.   HENT:  Negative for congestion, ear discharge, ear pain, nosebleeds, postnasal drip, rhinorrhea, sinus pressure, sneezing, sore throat, trouble swallowing and voice change.    Eyes:  Negative for photophobia, pain, discharge, redness and itching.   Respiratory:  Negative for cough, chest tightness, shortness of breath and wheezing.    Cardiovascular:  Negative for chest pain, palpitations and leg swelling.   Gastrointestinal:  Positive for constipation. Negative for abdominal pain, diarrhea, nausea and vomiting.   Endocrine: Negative for polyuria.   Genitourinary:  Negative for dysuria, frequency and urgency.   Musculoskeletal:  Negative for arthralgias, back pain, myalgias and neck pain.   Skin:  Negative for color change, pallor and rash.   Allergic/Immunologic: Negative for environmental allergies and food allergies.   Neurological:  Negative for dizziness, weakness, light-headedness and headaches.   Hematological:  Negative for adenopathy. Does not bruise/bleed easily.   Psychiatric/Behavioral:  Negative for behavioral problems. The patient is not nervous/anxious.      Past  Medical History:   Diagnosis Date    Abdominal pain     right lower [id number] in 1/2020    Diabetes mellitus (HCC)     Iron deficiency anemia     Lab test positive for detection of COVID-19 virus 11/28/2020     Past Surgical History:   Procedure Laterality Date    COLONOSCOPY      EGD AND COLONOSCOPY  02/05/2021    REDUCTION MAMMAPLASTY Bilateral     11/27/19    TUBAL LIGATION      last assessed 7/10/17    UPPER GASTROINTESTINAL ENDOSCOPY       Family History   Problem Relation Age of Onset    Diabetes Mother     Hypertension Mother     Heart disease Father         myocardial infarction    Diabetes Sister     Diabetes Maternal Grandmother     Hypertension Maternal Grandmother     No Known Problems Daughter     No Known Problems Maternal Grandfather     No Known Problems Paternal Grandmother     No Known Problems Paternal Grandfather     No Known Problems Sister     No Known Problems Sister     Leukemia Brother     No Known Problems Brother     No Known Problems Brother     No Known Problems Brother     No Known Problems Daughter     No Known Problems Son     No Known Problems Son     No Known Problems Maternal Aunt     No Known Problems Maternal Aunt     No Known Problems Maternal Aunt     No Known Problems Paternal Aunt     No Known Problems Paternal Aunt     No Known Problems Paternal Aunt     No Known Problems Paternal Uncle     No Known Problems Maternal Uncle     No Known Problems Maternal Uncle     No Known Problems Maternal Uncle     Breast cancer Cousin 35        Maternal    No Known Problems Brother     Leukemia Other     Substance Abuse Neg Hx     Mental illness Neg Hx      Social History     Tobacco Use    Smoking status: Never    Smokeless tobacco: Never   Vaping Use    Vaping status: Never Used   Substance and Sexual Activity    Alcohol use: No    Drug use: No    Sexual activity: Yes     Partners: Male     Birth control/protection: Female Sterilization     Current Outpatient  "Medications on File Prior to Visit   Medication Sig    docusate sodium (COLACE) 100 mg capsule Take 1 capsule (100 mg total) by mouth in the morning (Patient taking differently: Take 100 mg by mouth in the morning As needed)    famotidine (PEPCID) 20 mg tablet Take 1 tablet (20 mg total) by mouth 2 (two) times a day    famotidine (PEPCID) 20 mg tablet Take 1 tablet (20 mg total) by mouth 2 (two) times a day    multivitamin (THERAGRAN) TABS Take 1 tablet by mouth daily    pravastatin (PRAVACHOL) 10 mg tablet Take 1 tablet (10 mg total) by mouth daily at bedtime    [DISCONTINUED] metFORMIN (GLUCOPHAGE) 500 mg tablet Take 1 tablet (500 mg total) by mouth 2 times a day with meals    [DISCONTINUED] sitaGLIPtin (JANUVIA) 25 mg tablet Take 25 mg by mouth daily     Allergies   Allergen Reactions    Pollen Extract      Immunization History   Administered Date(s) Administered    COVID-19 PFIZER VACCINE 0.3 ML IM 04/30/2021, 05/21/2021    Meningococcal MCV4P 10/30/2009    Pneumococcal Polysaccharide PPV23 02/14/2014    Tdap 01/20/2020     Objective     /70   Pulse 87   Resp 16   Ht 5' 2\" (1.575 m)   Wt 65.3 kg (144 lb)   SpO2 98%   BMI 26.34 kg/m²     Physical Exam  Vitals and nursing note reviewed.   Constitutional:       Appearance: Normal appearance. She is not ill-appearing.   Cardiovascular:      Rate and Rhythm: Normal rate.      Pulses: no weak pulses.           Dorsalis pedis pulses are 2+ on the right side and 2+ on the left side.      Heart sounds: No murmur heard.  Pulmonary:      Effort: Pulmonary effort is normal. No respiratory distress.   Abdominal:      General: Abdomen is flat. There is no distension.   Musculoskeletal:         General: Normal range of motion.      Cervical back: Normal range of motion.   Feet:      Right foot:      Skin integrity: No ulcer, skin breakdown, erythema, warmth, callus or dry skin.      Left foot:      Skin integrity: No ulcer, skin breakdown, erythema, warmth, callus " or dry skin.   Skin:     Coloration: Skin is not jaundiced.   Neurological:      General: No focal deficit present.

## 2024-10-24 NOTE — ASSESSMENT & PLAN NOTE
She does not take Januvia and metformin is working fine A1c is stable, continue metformin  Lab Results   Component Value Date    HGBA1C 6.7 (H) 09/20/2024       Orders:    [facility] Diabetic eye exam    Diabetic foot exam; Future    Comprehensive metabolic panel; Future    Hemoglobin A1C; Future    metFORMIN (GLUCOPHAGE) 500 mg tablet; Take 1 tablet (500 mg total) by mouth 2 (two) times a day with meals

## 2024-10-24 NOTE — PATIENT INSTRUCTIONS
"Patient Education     Type 2 diabetes   The Basics   Written by the doctors and editors at [facility]   What is type 2 diabetes? -- This is a disorder that disrupts the way the body uses sugar. It is sometimes called type 2 diabetes mellitus.  All of the cells in the body need sugar to work normally. Sugar gets into the cells with the help of a hormone called insulin. Insulin is made by the pancreas, an organ in the belly. If there is not enough insulin, or if cells in the body don't respond normally to insulin, sugar builds up in the blood. That is what happens to people with diabetes.  There are 2 different types of diabetes:   In type 1 diabetes, the pancreas makes little or no insulin.   In type 2 diabetes, the pancreas still makes some insulin, but the cells in the body stop responding normally. Eventually, the pancreas cannot make enough insulin to keep up.  Having excess body weight or obesity increases a person's risk of developing type 2 diabetes. But people without excess body weight can get diabetes, too.  What are the symptoms of type 2 diabetes? -- Type 2 diabetes usually causes no symptoms. When symptoms do happen, they include:   Needing to urinate often   Intense thirst   Blurry vision  Can diabetes lead to other health problems? -- Yes. Type 2 diabetes might not make you feel sick. But if it is not managed, it can lead to serious problems over time, such as:   Heart attacks   Strokes   Kidney disease   Vision problems (or even blindness)   Pain or loss of feeling in the hands and feet   Needing to have fingers, toes, or other body parts removed (amputated)  How do I know if I have type 2 diabetes? -- Your doctor or nurse can do a blood test. There are 2 tests that can be used for this. Both involve measuring the amount of sugar in your blood, called your \"blood sugar\" or \"blood glucose\":   One of the tests measures your blood sugar at the time the blood sample is taken. This test is done in the " "morning. You can't eat or drink anything except water for at least 8 hours before the test.   The other test shows what your average blood sugar has been for the past 2 to 3 months. This blood test is called \"hemoglobin A1C\" or just \"A1C.\" It can be checked at any time of the day, even if you have recently eaten.  How is type 2 diabetes treated? -- The goals of treatment are to manage your blood sugar and lower the risk of future problems that can happen in people with diabetes.  Treatment might include:   Lifestyle changes - This is an important part of managing diabetes. It includes eating healthy foods and getting plenty of physical activity.   Medicines - There are a few medicines that help lower blood sugar. Some people need to take pills that help the body make more insulin or that help insulin do its job. Others need insulin shots.  Depending on what medicines you take, you might need to check your blood sugar regularly at home. But not everyone with type 2 diabetes needs to do this. Your doctor or nurse will tell you if you should be checking your blood sugar, and when and how to do this.  Sometimes, people with type 2 diabetes also need medicines to help prevent problems caused by the disease. For instance, medicines used to lower blood pressure can reduce the chances of a heart attack or stroke.   General medical care - It's also important to take care of other areas of your health. This includes watching your blood pressure and cholesterol levels. You should also get certain vaccines, such as vaccines to protect against the flu and coronavirus disease 2019 (\"COVID-19\"). Some people also need a vaccine to prevent pneumonia.  Can type 2 diabetes be prevented? -- Yes. To lower your chances of getting type 2 diabetes, the most important thing you can do is eat a healthy diet and get plenty of physical activity. This can help you lose weight if you are overweight. But eating well and being active are also good " for your overall health. Even gentle activity, like walking, has benefits.  If you smoke, quitting can also lower your risk of type 2 diabetes. Quitting smoking can be difficult, but your doctor or nurse can help.  All topics are updated as new evidence becomes available and our peer review process is complete.  This topic retrieved from [employer] on: Apr 24, 2024.  Topic 20390 Version 23.0  Release: 32.3.2 - C32.113  © 2024 UpToDate, Inc. and/or its affiliates. All rights reserved.  Consumer Information Use and Disclaimer   Disclaimer: This generalized information is a limited summary of diagnosis, treatment, and/or medication information. It is not meant to be comprehensive and should be used as a tool to help the user understand and/or assess potential diagnostic and treatment options. It does NOT include all information about conditions, treatments, medications, side effects, or risks that may apply to a specific patient. It is not intended to be medical advice or a substitute for the medical advice, diagnosis, or treatment of a health care provider based on the health care provider's examination and assessment of a patient's specific and unique circumstances. Patients must speak with a health care provider for complete information about their health, medical questions, and treatment options, including any risks or benefits regarding use of medications. This information does not endorse any treatments or medications as safe, effective, or approved for treating a specific patient. UpToDate, Inc. and its affiliates disclaim any warranty or liability relating to this information or the use thereof.The use of this information is governed by the Terms of Use, available at https://www.wolterskluwer.com/en/know/clinical-effectiveness-terms. 2024© UpToDate, Inc. and its affiliates and/or licensors. All rights reserved.  Copyright   © 2024 UpToDate, Inc. and/or its affiliates. All rights reserved.

## 2024-10-24 NOTE — ASSESSMENT & PLAN NOTE
She does not take iron regularly as she gets constipation problem, advised to see the hematologist, she feels fatigued all the time  Orders:    Ambulatory Referral to Hematology / Oncology; Future    CBC and Platelet; Future

## 2024-11-23 PROBLEM — Z12.4 CERVICAL CANCER SCREENING: Status: RESOLVED | Noted: 2024-05-23 | Resolved: 2024-11-23

## 2024-12-16 ENCOUNTER — OFFICE VISIT (OUTPATIENT)
Dept: HEMATOLOGY ONCOLOGY | Facility: CLINIC | Age: 49
End: 2024-12-16
Payer: COMMERCIAL

## 2024-12-16 ENCOUNTER — TELEPHONE (OUTPATIENT)
Dept: HEMATOLOGY ONCOLOGY | Facility: CLINIC | Age: 49
End: 2024-12-16

## 2024-12-16 VITALS
WEIGHT: 141 LBS | DIASTOLIC BLOOD PRESSURE: 76 MMHG | BODY MASS INDEX: 25.95 KG/M2 | HEIGHT: 62 IN | OXYGEN SATURATION: 98 % | RESPIRATION RATE: 16 BRPM | TEMPERATURE: 97.3 F | SYSTOLIC BLOOD PRESSURE: 126 MMHG | HEART RATE: 94 BPM

## 2024-12-16 DIAGNOSIS — R79.0 LOW FERRITIN: ICD-10-CM

## 2024-12-16 DIAGNOSIS — D50.9 IRON DEFICIENCY ANEMIA, UNSPECIFIED IRON DEFICIENCY ANEMIA TYPE: Primary | ICD-10-CM

## 2024-12-16 PROCEDURE — 99243 OFF/OP CNSLTJ NEW/EST LOW 30: CPT | Performed by: PHYSICIAN ASSISTANT

## 2024-12-16 RX ORDER — SODIUM CHLORIDE 9 MG/ML
20 INJECTION, SOLUTION INTRAVENOUS ONCE
OUTPATIENT
Start: 2024-12-30

## 2024-12-16 NOTE — TELEPHONE ENCOUNTER
Good afternoon,                   Please schedule patient iron infusions, per provider.    Thank you

## 2024-12-16 NOTE — PATIENT INSTRUCTIONS
High-iron grains - Whole-wheat breads, cereals, and bagels. Flour tortillas, biscuits, English or bran muffins, iron-fortified bran, pretzels, frozen waffles. Hot cereals like oatmeal, cream of wheat, or grits.   Moderate-iron grains - Nay bread, egg noodles, whole-wheat pasta, hamburger and hot dog buns.   Moderate-iron fruits - Dried apricots, dried figs, prune juice.   High-iron vegetables - Spinach, soybeans, canned pumpkin.   Moderate-iron vegetables - Asparagus, [place] sprouts, mushrooms, green peas, white or sweet potato with the skin, tomato sauce, beets, beans such as garbanzo or lima. Greens such as radha, turnip, kale, beet, or Swiss chard.   High-iron meats and other proteins - Beef, veal, lamb, pork, beef or chicken liver, clams, sardines, oysters, shrimp, tofu, baked beans with pork, lentils, tahini, tempeh. Beans such as kidney, lima, navy, or white.   Moderate-iron meats and other proteins - Chicken breast, turkey, eggs, fresh or canned tuna or mackerel.   Other high-iron foods - Pumpkin seeds.   Other moderate-iron foods - Molasses, soy milk, seeds such as sesame or sunflower. Nuts such as almonds, pistachios, cashews, and walnuts.  What else should I know? -- Some medicines and foods can change how much iron you absorb from your food. Talk to your doctor, nurse, or dietitian if you have questions.  All topics are updated as new evidence becomes available and our peer review process is complete.  This topic retrieved from [employer] on: Mar 20, 2024.  Topic 634693 Version 2.0

## 2024-12-16 NOTE — ASSESSMENT & PLAN NOTE
Iron def since at least 2017, never received IV iron in the past     We reviewed iron deficiency is not a primary hematological problem.  We reviewed causes of iron deficiency to include:   Blood loss (GI bleed, menses, frequent blood donation, frequent epistaxis)  Malabsorption (Patricio-en Y or sleeve gastrectomy, active or recent H. pylori, chronic prolonged PPI use)  Insufficient dietary intake      Not able to tolerate oral iron secondary to constipation therefore not compliant with taking consistently.    Recommend Venofer 200 mg IV weekly x 7 doses.    Continue follow-up with PCP for monitoring.  In the future PCP can order additional IV iron should it be needed    Follow-up hematology as needed      Orders:    Ambulatory Referral to Hematology / Oncology

## 2024-12-16 NOTE — PROGRESS NOTES
Name: Yesenia Booth      : 1975      MRN: [id number]  Encounter Provider: Michell Mendez PA-C  Encounter Date: 2024   Encounter department: [facility] [place]  :  Assessment & Plan  Low ferritin  Iron def since at least , never received IV iron in the past     We reviewed iron deficiency is not a primary hematological problem.  We reviewed causes of iron deficiency to include:   Blood loss (GI bleed, menses, frequent blood donation, frequent epistaxis)  Malabsorption (Patricio-en Y or sleeve gastrectomy, active or recent H. pylori, chronic prolonged PPI use)  Insufficient dietary intake      Not able to tolerate oral iron secondary to constipation therefore not compliant with taking consistently.    Recommend Venofer 200 mg IV weekly x 7 doses.    Continue follow-up with PCP for monitoring.  In the future PCP can order additional IV iron should it be needed    Follow-up hematology as needed      Orders:    Ambulatory Referral to Hematology / Oncology    Iron deficiency anemia, unspecified iron deficiency anemia type    Orders:    Ambulatory Referral to Hematology / Oncology        History of Present Illness   Chief Complaint   Patient presents with    Consult   Yesenia Booth is a 49 y.o. female presents for consult for iron def anemia.     Pertinent Medical History   Patient has had anemia since at minimum     Has been taking oral iron intermittently    Had a GI workup in  which was negative for any bleeding source or malabsorption source    By dietary recall does not sound like she eats enough iron rich foods    Review of Systems   Constitutional:  Positive for fatigue. Negative for appetite change, chills, fever and unexpected weight change.   HENT:  Negative for nosebleeds.    Respiratory:  Negative for cough and shortness of breath.    Cardiovascular:  Negative for chest pain, palpitations and leg swelling.   Gastrointestinal:  Negative for  "abdominal pain, constipation, diarrhea, nausea and vomiting.   Genitourinary:  Negative for difficulty urinating, dysuria, hematuria and menstrual problem.   Musculoskeletal:  Negative for arthralgias.   Skin: Negative.    Neurological:  Positive for dizziness, light-headedness and headaches. Negative for weakness and numbness.        RLS   Hematological: Negative.    Psychiatric/Behavioral: Negative.             Objective   /76 (BP Location: Right arm, Patient Position: Sitting, Cuff Size: Adult)   Pulse 94   Temp (!) 97.3 °F (36.3 °C) (Temporal)   Resp 16   Ht 5' 2\" (1.575 m)   Wt 64 kg (141 lb)   SpO2 98%   BMI 25.79 kg/m²     Physical Exam  Vitals reviewed.   Constitutional:       General: She is not in acute distress.     Appearance: She is well-developed. She is not ill-appearing.   HENT:      Head: Normocephalic and atraumatic.   Eyes:      General: No scleral icterus.     Conjunctiva/sclera: Conjunctivae normal.   Cardiovascular:      Rate and Rhythm: Normal rate and regular rhythm.      Heart sounds: Normal heart sounds. No murmur heard.  Pulmonary:      Effort: Pulmonary effort is normal. No respiratory distress.      Breath sounds: Normal breath sounds.   Abdominal:      Palpations: Abdomen is soft.      Tenderness: There is no abdominal tenderness.   Musculoskeletal:         General: No tenderness. Normal range of motion.      Cervical back: Normal range of motion and neck supple.      Right lower leg: No edema.      Left lower leg: No edema.   Lymphadenopathy:      Cervical: No cervical adenopathy.   Skin:     General: Skin is warm and dry.   Neurological:      Mental Status: She is alert and oriented to person, place, and time.      Cranial Nerves: No cranial nerve deficit.   Psychiatric:         Mood and Affect: Mood normal.         Behavior: Behavior normal.         Labs: I have reviewed the following labs:  Results for orders placed or performed in visit on 09/20/24   Hepatitis C " antibody   Result Value Ref Range    Hepatitis C Ab Non-reactive Non-Reactive   Albumin / creatinine urine ratio   Result Value Ref Range    Creatinine, Ur 91.5 Reference range not established. mg/dL    Albumin,U,Random 12.5 <20.0 mg/L    Albumin Creat Ratio 14 0 - 30 mg/g creatinine   Hemoglobin A1C   Result Value Ref Range    Hemoglobin A1C 6.7 (H) Normal 4.0-5.6%; PreDiabetic 5.7-6.4%; Diabetic >=6.5%; Glycemic control for adults with diabetes <7.0% %     mg/dl   CBC and Platelet   Result Value Ref Range    WBC 7.88 4.31 - 10.16 Thousand/uL    RBC 4.18 3.81 - 5.12 Million/uL    Hemoglobin 10.0 (L) 11.5 - 15.4 g/dL    Hematocrit 32.6 (L) 34.8 - 46.1 %    MCV 78 (L) 82 - 98 fL    MCH 23.9 (L) 26.8 - 34.3 pg    MCHC 30.7 (L) 31.4 - 37.4 g/dL    RDW 17.2 (H) 11.6 - 15.1 %    Platelets 459 (H) 149 - 390 Thousands/uL    MPV 9.0 8.9 - 12.7 fL   Lipid Panel with Direct LDL reflex   Result Value Ref Range    Cholesterol 182 See Comment mg/dL    Triglycerides 151 (H) See Comment mg/dL    HDL, Direct 58 >=50 mg/dL    LDL Calculated 94 0 - 100 mg/dL   Result Value Ref Range    Magnesium 1.8 (L) 1.9 - 2.7 mg/dL   Comprehensive metabolic panel   Result Value Ref Range    Sodium 138 135 - 147 mmol/L    Potassium 4.2 3.5 - 5.3 mmol/L    Chloride 106 96 - 108 mmol/L    CO2 27 21 - 32 mmol/L    ANION GAP 5 4 - 13 mmol/L    BUN 8 5 - 25 mg/dL    Creatinine 0.58 (L) 0.60 - 1.30 mg/dL    Glucose, Fasting 104 (H) 65 - 99 mg/dL    Calcium 8.7 8.4 - 10.2 mg/dL    AST 12 (L) 13 - 39 U/L    ALT 9 7 - 52 U/L    Alkaline Phosphatase 55 34 - 104 U/L    Total Protein 7.3 6.4 - 8.4 g/dL    Albumin 3.8 3.5 - 5.0 g/dL    Total Bilirubin 0.39 0.20 - 1.00 mg/dL    eGFR 107 ml/min/1.73sq m   TSH, 3rd generation with Free T4 reflex   Result Value Ref Range    TSH 3RD GENERATON 2.248 0.450 - 4.500 uIU/mL             Administrative Statements   I have spent a total time of 20 minutes in caring for this patient on the day of the visit/encounter  including Diagnostic results, Risks and benefits of tx options, Instructions for management, Importance of tx compliance, Impressions, Documenting in the medical record, Reviewing / ordering tests, medicine, procedures  , and Obtaining or reviewing history  .

## 2024-12-19 ENCOUNTER — TELEPHONE (OUTPATIENT)
Dept: HEMATOLOGY ONCOLOGY | Facility: CLINIC | Age: 49
End: 2024-12-19

## 2024-12-19 DIAGNOSIS — D50.9 IRON DEFICIENCY ANEMIA, UNSPECIFIED IRON DEFICIENCY ANEMIA TYPE: ICD-10-CM

## 2024-12-19 DIAGNOSIS — R79.0 LOW FERRITIN: Primary | ICD-10-CM

## 2024-12-19 NOTE — TELEPHONE ENCOUNTER
Patient advised of labwork needed.  She will go to [facility] and have a CBCD and Iron panel drawn either today or tomorrow.

## 2024-12-19 NOTE — TELEPHONE ENCOUNTER
Please make patient aware of the below per finance. Labs entered    Hi, trying to get a PA for the Venofer but the patient is going to need lab work done. She has [facility] and their turn around time can be up to 15 days plus the holidays. Can someone please reach out to her?

## 2024-12-20 ENCOUNTER — APPOINTMENT (OUTPATIENT)
Dept: LAB | Facility: CLINIC | Age: 49
End: 2024-12-20
Payer: COMMERCIAL

## 2024-12-20 DIAGNOSIS — D50.9 IRON DEFICIENCY ANEMIA, UNSPECIFIED IRON DEFICIENCY ANEMIA TYPE: ICD-10-CM

## 2024-12-20 DIAGNOSIS — R79.0 LOW FERRITIN: ICD-10-CM

## 2024-12-20 LAB
BASOPHILS # BLD AUTO: 0.06 THOUSANDS/ΜL (ref 0–0.1)
BASOPHILS NFR BLD AUTO: 1 % (ref 0–1)
EOSINOPHIL # BLD AUTO: 0.12 THOUSAND/ΜL (ref 0–0.61)
EOSINOPHIL NFR BLD AUTO: 2 % (ref 0–6)
ERYTHROCYTE [DISTWIDTH] IN BLOOD BY AUTOMATED COUNT: 15.3 % (ref 11.6–15.1)
FERRITIN SERPL-MCNC: 4 NG/ML (ref 11–307)
HCT VFR BLD AUTO: 32.3 % (ref 34.8–46.1)
HGB BLD-MCNC: 9.8 G/DL (ref 11.5–15.4)
IMM GRANULOCYTES # BLD AUTO: 0.04 THOUSAND/UL (ref 0–0.2)
IMM GRANULOCYTES NFR BLD AUTO: 1 % (ref 0–2)
IRON SATN MFR SERPL: 4 % (ref 15–50)
IRON SERPL-MCNC: 21 UG/DL (ref 50–212)
LYMPHOCYTES # BLD AUTO: 3.2 THOUSANDS/ΜL (ref 0.6–4.47)
LYMPHOCYTES NFR BLD AUTO: 42 % (ref 14–44)
MCH RBC QN AUTO: 23.4 PG (ref 26.8–34.3)
MCHC RBC AUTO-ENTMCNC: 30.3 G/DL (ref 31.4–37.4)
MCV RBC AUTO: 77 FL (ref 82–98)
MONOCYTES # BLD AUTO: 0.55 THOUSAND/ΜL (ref 0.17–1.22)
MONOCYTES NFR BLD AUTO: 7 % (ref 4–12)
NEUTROPHILS # BLD AUTO: 3.65 THOUSANDS/ΜL (ref 1.85–7.62)
NEUTS SEG NFR BLD AUTO: 47 % (ref 43–75)
NRBC BLD AUTO-RTO: 0 /100 WBCS
PLATELET # BLD AUTO: 518 THOUSANDS/UL (ref 149–390)
PMV BLD AUTO: 9.2 FL (ref 8.9–12.7)
RBC # BLD AUTO: 4.19 MILLION/UL (ref 3.81–5.12)
TIBC SERPL-MCNC: 494.2 UG/DL (ref 250–450)
TRANSFERRIN SERPL-MCNC: 353 MG/DL (ref 203–362)
UIBC SERPL-MCNC: 473 UG/DL (ref 155–355)
WBC # BLD AUTO: 7.62 THOUSAND/UL (ref 4.31–10.16)

## 2024-12-20 PROCEDURE — 83550 IRON BINDING TEST: CPT

## 2024-12-20 PROCEDURE — 85025 COMPLETE CBC W/AUTO DIFF WBC: CPT

## 2024-12-20 PROCEDURE — 83540 ASSAY OF IRON: CPT

## 2024-12-20 PROCEDURE — 36415 COLL VENOUS BLD VENIPUNCTURE: CPT

## 2024-12-20 PROCEDURE — 82728 ASSAY OF FERRITIN: CPT

## 2024-12-23 NOTE — PROGRESS NOTES
Assessment & Plan   Problem List Items Addressed This Visit    None  Visit Diagnoses       Routine gynecological examination    -  Primary    Relevant Orders    Liquid-based pap, screening          Discussion    All questions have been answered to her satisfaction  RTO for APE or sooner if needed  Pap done.   Mammo ordered by PCP      Subjective     HPI   Yesenia Booth is a 49 y.o. female who presents for annual well woman exam.     LMP - 12/17/24 ; Periods are reg q 28-30 days and last 4  days at the most; No excessive bleeding; No intermenstrual bleeding or spotting; Cramps are tolerable. Some months day 2 is heavier, will change pad 3 times that day.   We did review typical menopause time frame. Pt notes that although her age is listed as 49 years old, her paperwork was done erroneously when she came to the [facility]"" and she is actually only 46 years old.     No current vulvar itch/burn; No vaginal itch/burn; No abn discharge or odor; No urinary sx - burning/pain/frequency/hematuria  She notes that she did have some increased vaginal d/c a few weeks ago but sx seem to have resolved. Denies odor, itching or irritation or other STI concerns.     No concerning breast masses, asymmetry, nipple discharge or bleeding, changes in skin of breast, or breast tenderness bilaterally    No abd/pelvic pain or HAs;     No menopausal symptoms.    Pt is not often sexually active in a mutually monog/ sexual relationship; No issues with intercourse; She declines sti/hiv/hep testing; Feels safe at home  Current contraception: tubal after last pregnancy    (+) PCP for routine Bw/care;    Last Pap : 06/21/19 WNL neg HPV   History of abnormal Pap smear: denies   Mammo:09/09/2022 BIRADS 1   Abnormal mammo: denies   Colonoscopy:02/2021 f/u 10 years     Review of Systems   Constitutional: Negative.    Respiratory: Negative.     Gastrointestinal: Negative.    Endocrine: Negative.    Genitourinary: Negative.        The following portions  of the patient's history were reviewed and updated as appropriate: allergies, current medications, past family history, past medical history, past social history, past surgical history, and problem list.         OB History        4    Para   4    Term   4            AB        Living   4       SAB        IAB        Ectopic        Multiple        Live Births   4                 Past Medical History:   Diagnosis Date   • Abdominal pain     right lower swuhoihv-jrzthktaxbiq-obvhctc in 2020   • Diabetes mellitus (HCC)    • Iron deficiency anemia    • Lab test positive for detection of COVID-19 virus 2020       Past Surgical History:   Procedure Laterality Date   • COLONOSCOPY     • EGD AND COLONOSCOPY  2021   • REDUCTION MAMMAPLASTY Bilateral     19   • TUBAL LIGATION      last assessed 7/10/17   • UPPER GASTROINTESTINAL ENDOSCOPY         Family History   Problem Relation Age of Onset   • Diabetes Mother    • Hypertension Mother    • Heart disease Father         myocardial infarction   • Diabetes Sister    • Diabetes Maternal Grandmother    • Hypertension Maternal Grandmother    • No Known Problems Daughter    • No Known Problems Maternal Grandfather    • No Known Problems Paternal Grandmother    • No Known Problems Paternal Grandfather    • No Known Problems Sister    • No Known Problems Sister    • Leukemia Brother    • No Known Problems Brother    • No Known Problems Brother    • No Known Problems Brother    • No Known Problems Daughter    • No Known Problems Son    • No Known Problems Son    • No Known Problems Maternal Aunt    • No Known Problems Maternal Aunt    • No Known Problems Maternal Aunt    • No Known Problems Paternal Aunt    • No Known Problems Paternal Aunt    • No Known Problems Paternal Aunt    • No Known Problems Paternal Uncle    • No Known Problems Maternal Uncle    • No Known Problems Maternal Uncle    • No Known Problems Maternal Uncle    • Breast cancer Cousin 35         Maternal   • No Known Problems Brother    • Leukemia Other    • Substance Abuse Neg Hx    • Mental illness Neg Hx        Social History     Socioeconomic History   • Marital status: /Civil Union     Spouse name: Not on file   • Number of children: Not on file   • Years of education: Not on file   • Highest education level: Not on file   Occupational History   • Not on file   Tobacco Use   • Smoking status: Never   • Smokeless tobacco: Never   Vaping Use   • Vaping status: Never Used   Substance and Sexual Activity   • Alcohol use: No   • Drug use: No   • Sexual activity: Yes     Partners: Male     Birth control/protection: Female Sterilization   Other Topics Concern   • Not on file   Social History Narrative   • Not on file     Social Drivers of Health     Financial Resource Strain: Not on file   Food Insecurity: Not on file   Transportation Needs: Not on file   Physical Activity: Not on file   Stress: Not on file   Social Connections: Not on file   Intimate Partner Violence: Not on file   Housing Stability: Not on file         Current Outpatient Medications:   •  docusate sodium (COLACE) 100 mg capsule, Take 1 capsule (100 mg total) by mouth in the morning (Patient taking differently: Take 100 mg by mouth in the morning As needed), Disp: 90 capsule, Rfl: 2  •  metFORMIN (GLUCOPHAGE) 500 mg tablet, Take 1 tablet (500 mg total) by mouth 2 (two) times a day with meals, Disp: 180 tablet, Rfl: 1  •  multivitamin (THERAGRAN) TABS, Take 1 tablet by mouth daily, Disp: , Rfl:   •  famotidine (PEPCID) 20 mg tablet, Take 1 tablet (20 mg total) by mouth 2 (two) times a day, Disp: 180 tablet, Rfl: 0  •  famotidine (PEPCID) 20 mg tablet, Take 1 tablet (20 mg total) by mouth 2 (two) times a day, Disp: 180 tablet, Rfl: 0  •  pravastatin (PRAVACHOL) 10 mg tablet, Take 1 tablet (10 mg total) by mouth daily at bedtime (Patient not taking: Reported on 12/24/2024), Disp: 100 tablet, Rfl: 3    Allergies   Allergen  Reactions   • Pollen Extract        Objective   Vitals:    12/24/24 1256   BP: 112/76   BP Location: Left arm   Patient Position: Sitting   Cuff Size: Standard   Weight: 66 kg (145 lb 9.6 oz)     Physical Exam  Vitals reviewed.   HENT:      Head: Normocephalic and atraumatic.   Cardiovascular:      Rate and Rhythm: Normal rate and regular rhythm.   Pulmonary:      Effort: Pulmonary effort is normal.      Breath sounds: Normal breath sounds.   Chest:   Breasts:     Breasts are symmetrical.      Right: No swelling, bleeding, inverted nipple, mass, nipple discharge, skin change or tenderness.      Left: No swelling, bleeding, inverted nipple, mass, nipple discharge, skin change or tenderness.   Abdominal:      General: Abdomen is flat. Bowel sounds are normal.      Palpations: Abdomen is soft.      Tenderness: There is no abdominal tenderness. There is no right CVA tenderness, left CVA tenderness or guarding.   Genitourinary:     General: Normal vulva.      Pubic Area: No rash.       Labia:         Right: No rash, tenderness, lesion or injury.         Left: No rash, tenderness, lesion or injury.       Urethra: No prolapse, urethral pain, urethral swelling or urethral lesion.      Vagina: Normal. No signs of injury and foreign body. No vaginal discharge or erythema.      Cervix: Normal.      Uterus: Normal.       Adnexa: Right adnexa normal and left adnexa normal.   Musculoskeletal:      Cervical back: Neck supple.   Lymphadenopathy:      Upper Body:      Right upper body: No axillary adenopathy.      Left upper body: No axillary adenopathy.   Skin:     General: Skin is warm and dry.   Neurological:      Mental Status: She is alert and oriented to person, place, and time.   Psychiatric:         Mood and Affect: Mood normal.         Behavior: Behavior normal.         Thought Content: Thought content normal.         Judgment: Judgment normal.         There are no Patient Instructions on file for this visit.

## 2024-12-24 ENCOUNTER — OFFICE VISIT (OUTPATIENT)
Dept: OBGYN CLINIC | Facility: CLINIC | Age: 49
End: 2024-12-24
Payer: COMMERCIAL

## 2024-12-24 VITALS — WEIGHT: 145.6 LBS | DIASTOLIC BLOOD PRESSURE: 76 MMHG | BODY MASS INDEX: 26.63 KG/M2 | SYSTOLIC BLOOD PRESSURE: 112 MMHG

## 2024-12-24 DIAGNOSIS — Z01.419 ROUTINE GYNECOLOGICAL EXAMINATION: Primary | ICD-10-CM

## 2024-12-24 PROCEDURE — G0476 HPV COMBO ASSAY CA SCREEN: HCPCS | Performed by: PHYSICIAN ASSISTANT

## 2024-12-24 PROCEDURE — G0145 SCR C/V CYTO,THINLAYER,RESCR: HCPCS | Performed by: PHYSICIAN ASSISTANT

## 2024-12-24 PROCEDURE — S0612 ANNUAL GYNECOLOGICAL EXAMINA: HCPCS | Performed by: PHYSICIAN ASSISTANT

## 2024-12-26 LAB
HPV HR 12 DNA CVX QL NAA+PROBE: NEGATIVE
HPV16 DNA CVX QL NAA+PROBE: NEGATIVE
HPV18 DNA CVX QL NAA+PROBE: NEGATIVE

## 2024-12-27 ENCOUNTER — RESULTS FOLLOW-UP (OUTPATIENT)
Dept: OBGYN CLINIC | Facility: CLINIC | Age: 49
End: 2024-12-27

## 2024-12-30 LAB
LAB AP GYN PRIMARY INTERPRETATION: NORMAL
Lab: NORMAL

## 2025-01-08 ENCOUNTER — HOSPITAL ENCOUNTER (OUTPATIENT)
Dept: INFUSION CENTER | Facility: CLINIC | Age: 50
End: 2025-01-08

## 2025-01-09 ENCOUNTER — HOSPITAL ENCOUNTER (OUTPATIENT)
Dept: INFUSION CENTER | Facility: CLINIC | Age: 50
Discharge: HOME | End: 2025-01-09
Payer: COMMERCIAL

## 2025-01-09 VITALS
RESPIRATION RATE: 18 BRPM | SYSTOLIC BLOOD PRESSURE: 115 MMHG | TEMPERATURE: 96.9 F | OXYGEN SATURATION: 98 % | DIASTOLIC BLOOD PRESSURE: 80 MMHG | HEART RATE: 84 BPM

## 2025-01-09 DIAGNOSIS — D50.9 IRON DEFICIENCY ANEMIA, UNSPECIFIED IRON DEFICIENCY ANEMIA TYPE: ICD-10-CM

## 2025-01-09 DIAGNOSIS — R79.0 LOW FERRITIN: Primary | ICD-10-CM

## 2025-01-09 PROCEDURE — 96365 THER/PROPH/DIAG IV INF INIT: CPT

## 2025-01-09 RX ORDER — SODIUM CHLORIDE 9 MG/ML
20 INJECTION, SOLUTION INTRAVENOUS ONCE
Status: COMPLETED | OUTPATIENT
Start: 2025-01-09 | End: 2025-01-09

## 2025-01-09 RX ORDER — SODIUM CHLORIDE 9 MG/ML
20 INJECTION, SOLUTION INTRAVENOUS ONCE
Status: CANCELLED | OUTPATIENT
Start: 2025-01-15

## 2025-01-09 RX ADMIN — IRON SUCROSE 200 MG: 20 INJECTION, SOLUTION INTRAVENOUS at 14:30

## 2025-01-09 RX ADMIN — SODIUM CHLORIDE 20 ML/HR: 9 INJECTION, SOLUTION INTRAVENOUS at 14:28

## 2025-01-09 NOTE — PROGRESS NOTES
Pt tolerated venofer without incident. Confirmed next apt for 1/15/25 @ 3:30pm @ [facility]. Declines AVS.

## 2025-01-15 ENCOUNTER — HOSPITAL ENCOUNTER (OUTPATIENT)
Dept: INFUSION CENTER | Facility: CLINIC | Age: 50
Discharge: HOME | End: 2025-01-15
Payer: COMMERCIAL

## 2025-01-15 VITALS
SYSTOLIC BLOOD PRESSURE: 117 MMHG | HEART RATE: 83 BPM | TEMPERATURE: 97.5 F | OXYGEN SATURATION: 98 % | RESPIRATION RATE: 18 BRPM | DIASTOLIC BLOOD PRESSURE: 79 MMHG

## 2025-01-15 DIAGNOSIS — D50.9 IRON DEFICIENCY ANEMIA, UNSPECIFIED IRON DEFICIENCY ANEMIA TYPE: ICD-10-CM

## 2025-01-15 DIAGNOSIS — R79.0 LOW FERRITIN: Primary | ICD-10-CM

## 2025-01-15 RX ORDER — SODIUM CHLORIDE 9 MG/ML
20 INJECTION, SOLUTION INTRAVENOUS ONCE
Status: COMPLETED | OUTPATIENT
Start: 2025-01-15 | End: 2025-01-15

## 2025-01-15 RX ORDER — SODIUM CHLORIDE 9 MG/ML
20 INJECTION, SOLUTION INTRAVENOUS ONCE
Status: CANCELLED | OUTPATIENT
Start: 2025-01-22

## 2025-01-15 RX ADMIN — IRON SUCROSE 200 MG: 20 INJECTION, SOLUTION INTRAVENOUS at 15:54

## 2025-01-15 RX ADMIN — SODIUM CHLORIDE 20 ML/HR: 0.9 INJECTION, SOLUTION INTRAVENOUS at 15:54

## 2025-01-15 NOTE — PROGRESS NOTES
Patient tolerated Venofer without issue. PIV removed intact, coban wrap in place. Patient confirms next appt 1/22 at 1530, declines AVS.

## 2025-01-22 ENCOUNTER — HOSPITAL ENCOUNTER (OUTPATIENT)
Dept: INFUSION CENTER | Facility: CLINIC | Age: 50
Discharge: HOME | End: 2025-01-22
Payer: COMMERCIAL

## 2025-01-22 VITALS
SYSTOLIC BLOOD PRESSURE: 132 MMHG | OXYGEN SATURATION: 99 % | HEART RATE: 86 BPM | RESPIRATION RATE: 18 BRPM | TEMPERATURE: 97.2 F | DIASTOLIC BLOOD PRESSURE: 88 MMHG

## 2025-01-22 DIAGNOSIS — R79.0 LOW FERRITIN: Primary | ICD-10-CM

## 2025-01-22 DIAGNOSIS — D50.9 IRON DEFICIENCY ANEMIA, UNSPECIFIED IRON DEFICIENCY ANEMIA TYPE: ICD-10-CM

## 2025-01-22 PROCEDURE — 96365 THER/PROPH/DIAG IV INF INIT: CPT

## 2025-01-22 RX ORDER — SODIUM CHLORIDE 9 MG/ML
20 INJECTION, SOLUTION INTRAVENOUS ONCE
OUTPATIENT
Start: 2025-01-29

## 2025-01-22 RX ORDER — SODIUM CHLORIDE 9 MG/ML
20 INJECTION, SOLUTION INTRAVENOUS ONCE
Status: COMPLETED | OUTPATIENT
Start: 2025-01-22 | End: 2025-01-22

## 2025-01-22 RX ADMIN — IRON SUCROSE 200 MG: 20 INJECTION, SOLUTION INTRAVENOUS at 15:56

## 2025-01-22 RX ADMIN — SODIUM CHLORIDE 20 ML/HR: 0.9 INJECTION, SOLUTION INTRAVENOUS at 15:57

## 2025-01-29 ENCOUNTER — HOSPITAL ENCOUNTER (OUTPATIENT)
Dept: INFUSION CENTER | Facility: CLINIC | Age: 50
Discharge: HOME | End: 2025-01-29
Payer: COMMERCIAL

## 2025-01-29 VITALS
HEART RATE: 90 BPM | TEMPERATURE: 97.4 F | OXYGEN SATURATION: 99 % | SYSTOLIC BLOOD PRESSURE: 137 MMHG | DIASTOLIC BLOOD PRESSURE: 87 MMHG | RESPIRATION RATE: 18 BRPM

## 2025-01-29 DIAGNOSIS — R79.0 LOW FERRITIN: Primary | ICD-10-CM

## 2025-01-29 DIAGNOSIS — D50.9 IRON DEFICIENCY ANEMIA, UNSPECIFIED IRON DEFICIENCY ANEMIA TYPE: ICD-10-CM

## 2025-01-29 PROCEDURE — 96365 THER/PROPH/DIAG IV INF INIT: CPT

## 2025-01-29 RX ORDER — SODIUM CHLORIDE 9 MG/ML
20 INJECTION, SOLUTION INTRAVENOUS ONCE
Status: CANCELLED | OUTPATIENT
Start: 2025-02-05

## 2025-01-29 RX ORDER — SODIUM CHLORIDE 9 MG/ML
20 INJECTION, SOLUTION INTRAVENOUS ONCE
Status: COMPLETED | OUTPATIENT
Start: 2025-01-29 | End: 2025-01-29

## 2025-01-29 RX ADMIN — SODIUM CHLORIDE 20 ML/HR: 9 INJECTION, SOLUTION INTRAVENOUS at 15:41

## 2025-01-29 RX ADMIN — IRON SUCROSE 200 MG: 20 INJECTION, SOLUTION INTRAVENOUS at 15:56

## 2025-01-29 NOTE — PROGRESS NOTES
Patient arrived today for Venofer infusion feeling overall well but with c/o hot flashed at night since starting treatment. Messaged provider as Patient states that she has been too busy to call office. Provider believes that this is not a side effect but possibly r/t decreased anemia. PIV placed, no lab parameters for treatment today. Patient resting in recliner with call bell in reach

## 2025-01-29 NOTE — PROGRESS NOTES
Patient tolerated Venofer without issue, PIV removed intact. Patient aware of next appt 2/5 at 1530, declines AVS.

## 2025-02-05 ENCOUNTER — HOSPITAL ENCOUNTER (OUTPATIENT)
Dept: INFUSION CENTER | Facility: CLINIC | Age: 50
Discharge: HOME | End: 2025-02-05
Payer: COMMERCIAL

## 2025-02-05 VITALS
SYSTOLIC BLOOD PRESSURE: 125 MMHG | OXYGEN SATURATION: 97 % | DIASTOLIC BLOOD PRESSURE: 82 MMHG | TEMPERATURE: 97.2 F | HEART RATE: 80 BPM

## 2025-02-05 DIAGNOSIS — R79.0 LOW FERRITIN: Primary | ICD-10-CM

## 2025-02-05 DIAGNOSIS — D50.9 IRON DEFICIENCY ANEMIA, UNSPECIFIED IRON DEFICIENCY ANEMIA TYPE: ICD-10-CM

## 2025-02-05 RX ORDER — SODIUM CHLORIDE 9 MG/ML
20 INJECTION, SOLUTION INTRAVENOUS ONCE
Status: COMPLETED | OUTPATIENT
Start: 2025-02-05 | End: 2025-02-05

## 2025-02-05 RX ORDER — SODIUM CHLORIDE 9 MG/ML
20 INJECTION, SOLUTION INTRAVENOUS ONCE
Status: CANCELLED | OUTPATIENT
Start: 2025-02-12

## 2025-02-05 RX ADMIN — SODIUM CHLORIDE 20 ML/HR: 9 INJECTION, SOLUTION INTRAVENOUS at 15:42

## 2025-02-05 RX ADMIN — IRON SUCROSE 200 MG: 20 INJECTION, SOLUTION INTRAVENOUS at 15:43

## 2025-02-05 NOTE — PROGRESS NOTES
Patient to infusion for venofer.  She tolerated treatment today.  Next appointment 2/12 1330, she declined AVS

## 2025-02-12 ENCOUNTER — HOSPITAL ENCOUNTER (OUTPATIENT)
Dept: INFUSION CENTER | Facility: CLINIC | Age: 50
Discharge: HOME | End: 2025-02-12
Payer: COMMERCIAL

## 2025-02-12 DIAGNOSIS — R79.0 LOW FERRITIN: Primary | ICD-10-CM

## 2025-02-12 DIAGNOSIS — D50.9 IRON DEFICIENCY ANEMIA, UNSPECIFIED IRON DEFICIENCY ANEMIA TYPE: ICD-10-CM

## 2025-02-12 PROCEDURE — 96365 THER/PROPH/DIAG IV INF INIT: CPT

## 2025-02-12 RX ORDER — SODIUM CHLORIDE 9 MG/ML
20 INJECTION, SOLUTION INTRAVENOUS ONCE
Status: COMPLETED | OUTPATIENT
Start: 2025-02-12 | End: 2025-02-12

## 2025-02-12 RX ORDER — SODIUM CHLORIDE 9 MG/ML
20 INJECTION, SOLUTION INTRAVENOUS ONCE
Status: CANCELLED | OUTPATIENT
Start: 2025-02-19

## 2025-02-12 RX ADMIN — IRON SUCROSE 200 MG: 20 INJECTION, SOLUTION INTRAVENOUS at 15:45

## 2025-02-12 RX ADMIN — SODIUM CHLORIDE 20 ML/HR: 9 INJECTION, SOLUTION INTRAVENOUS at 15:40

## 2025-02-19 ENCOUNTER — HOSPITAL ENCOUNTER (OUTPATIENT)
Dept: INFUSION CENTER | Facility: CLINIC | Age: 50
Discharge: HOME | End: 2025-02-19
Payer: COMMERCIAL

## 2025-02-19 VITALS
TEMPERATURE: 97.5 F | DIASTOLIC BLOOD PRESSURE: 82 MMHG | OXYGEN SATURATION: 97 % | SYSTOLIC BLOOD PRESSURE: 121 MMHG | RESPIRATION RATE: 18 BRPM | HEART RATE: 80 BPM

## 2025-02-19 DIAGNOSIS — D50.9 IRON DEFICIENCY ANEMIA, UNSPECIFIED IRON DEFICIENCY ANEMIA TYPE: ICD-10-CM

## 2025-02-19 DIAGNOSIS — R79.0 LOW FERRITIN: Primary | ICD-10-CM

## 2025-02-19 PROCEDURE — 96365 THER/PROPH/DIAG IV INF INIT: CPT

## 2025-02-19 RX ORDER — SODIUM CHLORIDE 9 MG/ML
20 INJECTION, SOLUTION INTRAVENOUS ONCE
Status: CANCELLED | OUTPATIENT
Start: 2025-02-19

## 2025-02-19 RX ORDER — SODIUM CHLORIDE 9 MG/ML
20 INJECTION, SOLUTION INTRAVENOUS ONCE
Status: COMPLETED | OUTPATIENT
Start: 2025-02-19 | End: 2025-02-19

## 2025-02-19 RX ADMIN — IRON SUCROSE 200 MG: 20 INJECTION, SOLUTION INTRAVENOUS at 15:36

## 2025-02-19 RX ADMIN — SODIUM CHLORIDE 20 ML/HR: 0.9 INJECTION, SOLUTION INTRAVENOUS at 15:35

## 2025-02-19 NOTE — PROGRESS NOTES
Pt here for venofer, tolerated well with no complaints. No further infusion appts needed at this time. Declined AVS.

## 2025-02-19 NOTE — PLAN OF CARE
Problem: Potential for Falls  Goal: Patient will remain free of falls  Description: INTERVENTIONS:  - Educate patient/family on patient safety including physical limitations  - Instruct patient to call for assistance with activity   - Consult OT/PT to assist with strengthening/mobility   - Keep Call bell within reach  - Keep bed low and locked with side rails adjusted as appropriate  - Keep care items and personal belongings within reach  - Initiate and maintain comfort rounds  - Make Fall Risk Sign visible to staff  - Apply yellow socks and bracelet for high fall risk patients  - Consider moving patient to room near nurses station  Outcome: Completed     Problem: Knowledge Deficit  Goal: Patient/family/caregiver demonstrates understanding of disease process, treatment plan, medications, and discharge instructions  Description: Complete learning assessment and assess knowledge base.  Interventions:  - Provide teaching at level of understanding  - Provide teaching via preferred learning methods  Outcome: Completed      Caller: Arvin Breana    Relationship to patient: Self    Best call back number: [phone]    Where are you experiencing symptoms: [facility]    How long have you been experiencing symptoms: 2 DAYS    BREANA WOULD LIKE A CALL TO DISCUSS IF THIS COULD BE RELATED TO HER UTI THAT SHE WAS SEEN FOR ON 12/8/23 AND WHAT IS RECOMMENDED    If a prescription is needed, what is your preferred pharmacy:   [facility]/pharmacy #[id number] - [place], [place] - [address] - [phone]  - [phone]   [address] [address]  Phone: [phone] Fax: [phone]

## 2025-02-21 DIAGNOSIS — K59.00 CONSTIPATION, UNSPECIFIED CONSTIPATION TYPE: ICD-10-CM

## 2025-02-27 RX ORDER — PLECANATIDE 3 MG/1
1 TABLET ORAL DAILY
Qty: 90 TABLET | Refills: 1 | OUTPATIENT
Start: 2025-02-27

## 2025-03-02 LAB
ALBUMIN SERPL-MCNC: 3.8 G/DL (ref 3.8–4.9)
ALP SERPL-CCNC: 64 IU/L (ref 44–121)
ALT SERPL-CCNC: 15 IU/L (ref 0–32)
AST SERPL-CCNC: 17 IU/L (ref 0–40)
BASOPHILS # BLD AUTO: 0.1 X10E3/UL (ref 0–0.2)
BASOPHILS NFR BLD AUTO: 1 %
BILIRUB SERPL-MCNC: <0.2 MG/DL (ref 0–1.2)
BUN SERPL-MCNC: 10 MG/DL (ref 6–24)
BUN/CREAT SERPL: 18 (ref 9–23)
CALCIUM SERPL-MCNC: 9.3 MG/DL (ref 8.7–10.2)
CHLORIDE SERPL-SCNC: 102 MMOL/L (ref 96–106)
CHOLEST SERPL-MCNC: 177 MG/DL (ref 100–199)
CO2 SERPL-SCNC: 24 MMOL/L (ref 20–29)
CREAT SERPL-MCNC: 0.55 MG/DL (ref 0.57–1)
EGFR: 111 ML/MIN/1.73
EOSINOPHIL # BLD AUTO: 0.2 X10E3/UL (ref 0–0.4)
EOSINOPHIL NFR BLD AUTO: 2 %
ERYTHROCYTE [DISTWIDTH] IN BLOOD BY AUTOMATED COUNT: 20.3 % (ref 11.7–15.4)
GLOBULIN SER-MCNC: 2.7 G/DL (ref 1.5–4.5)
GLUCOSE SERPL-MCNC: 148 MG/DL (ref 70–99)
HBA1C MFR BLD: 6.8 % (ref 4.8–5.6)
HCT VFR BLD AUTO: 36.4 % (ref 34–46.6)
HDLC SERPL-MCNC: 56 MG/DL
HGB BLD-MCNC: 11.9 G/DL (ref 11.1–15.9)
IMM GRANULOCYTES # BLD: 0.1 X10E3/UL (ref 0–0.1)
IMM GRANULOCYTES NFR BLD: 1 %
LDLC SERPL CALC-MCNC: 100 MG/DL (ref 0–99)
LDLC/HDLC SERPL: 1.8 RATIO (ref 0–3.2)
LYMPHOCYTES # BLD AUTO: 2.7 X10E3/UL (ref 0.7–3.1)
LYMPHOCYTES NFR BLD AUTO: 31 %
MCH RBC QN AUTO: 26.6 PG (ref 26.6–33)
MCHC RBC AUTO-ENTMCNC: 32.7 G/DL (ref 31.5–35.7)
MCV RBC AUTO: 81 FL (ref 79–97)
MONOCYTES # BLD AUTO: 0.6 X10E3/UL (ref 0.1–0.9)
MONOCYTES NFR BLD AUTO: 7 %
NEUTROPHILS # BLD AUTO: 5.1 X10E3/UL (ref 1.4–7)
NEUTROPHILS NFR BLD AUTO: 58 %
PLATELET # BLD AUTO: 370 X10E3/UL (ref 150–450)
POTASSIUM SERPL-SCNC: 5.2 MMOL/L (ref 3.5–5.2)
PROT SERPL-MCNC: 6.5 G/DL (ref 6–8.5)
RBC # BLD AUTO: 4.48 X10E6/UL (ref 3.77–5.28)
SL AMB VLDL CHOLESTEROL CALC: 21 MG/DL (ref 5–40)
SODIUM SERPL-SCNC: 137 MMOL/L (ref 134–144)
TRIGL SERPL-MCNC: 120 MG/DL (ref 0–149)
WBC # BLD AUTO: 8.7 X10E3/UL (ref 3.4–10.8)

## 2025-03-03 ENCOUNTER — OFFICE VISIT (OUTPATIENT)
Dept: FAMILY MEDICINE CLINIC | Facility: CLINIC | Age: 50
End: 2025-03-03
Payer: COMMERCIAL

## 2025-03-03 VITALS
HEIGHT: 62 IN | OXYGEN SATURATION: 97 % | DIASTOLIC BLOOD PRESSURE: 70 MMHG | BODY MASS INDEX: 27.42 KG/M2 | SYSTOLIC BLOOD PRESSURE: 124 MMHG | RESPIRATION RATE: 16 BRPM | WEIGHT: 149 LBS | HEART RATE: 88 BPM

## 2025-03-03 DIAGNOSIS — K59.00 CONSTIPATION, UNSPECIFIED CONSTIPATION TYPE: Primary | ICD-10-CM

## 2025-03-03 DIAGNOSIS — K31.A0 GASTRIC INTESTINAL METAPLASIA: ICD-10-CM

## 2025-03-03 DIAGNOSIS — H61.22 IMPACTED CERUMEN OF LEFT EAR: ICD-10-CM

## 2025-03-03 DIAGNOSIS — M75.01 ADHESIVE CAPSULITIS OF RIGHT SHOULDER ASSOCIATED WITH TYPE 2 DIABETES MELLITUS  (HCC): ICD-10-CM

## 2025-03-03 DIAGNOSIS — E78.2 MIXED HYPERLIPIDEMIA: ICD-10-CM

## 2025-03-03 DIAGNOSIS — Z12.31 ENCOUNTER FOR SCREENING MAMMOGRAM FOR BREAST CANCER: ICD-10-CM

## 2025-03-03 DIAGNOSIS — E11.618 ADHESIVE CAPSULITIS OF RIGHT SHOULDER ASSOCIATED WITH TYPE 2 DIABETES MELLITUS  (HCC): ICD-10-CM

## 2025-03-03 DIAGNOSIS — Z00.00 ANNUAL PHYSICAL EXAM: ICD-10-CM

## 2025-03-03 DIAGNOSIS — E11.9 TYPE 2 DIABETES MELLITUS WITHOUT COMPLICATION, WITHOUT LONG-TERM CURRENT USE OF INSULIN (HCC): ICD-10-CM

## 2025-03-03 PROCEDURE — 99396 PREV VISIT EST AGE 40-64: CPT | Performed by: FAMILY MEDICINE

## 2025-03-03 PROCEDURE — 99214 OFFICE O/P EST MOD 30 MIN: CPT | Performed by: FAMILY MEDICINE

## 2025-03-03 NOTE — ASSESSMENT & PLAN NOTE
Still has some restricted movements advised to do continue regular home exercises  Lab Results   Component Value Date    HGBA1C 6.8 (H) 03/01/2025

## 2025-03-03 NOTE — ASSESSMENT & PLAN NOTE
A1c slightly high, discussed about diet and medication if blood sugar does not improve next time the dose of medication can be adjusted  Lab Results   Component Value Date    HGBA1C 6.8 (H) 03/01/2025       Orders:  •  CBC and differential; Future  •  Comprehensive metabolic panel; Future  •  Hemoglobin A1C; Future  •  Lipid panel; Future

## 2025-03-03 NOTE — ASSESSMENT & PLAN NOTE
Orders:  •  Mammo screening bilateral w 3d and cad; Future   Addended by: ALBERTINA LARIOS on: 5/23/2018 04:36 PM     Modules accepted: Orders

## 2025-03-03 NOTE — PROGRESS NOTES
Adult Annual Physical  Name: Yesenia Booth      : 1975      MRN: [id number]  Encounter Provider: Liliam Goode MD  Encounter Date: 3/3/2025   Encounter department: [facility]    Assessment & Plan  Constipation, unspecified constipation type  Previously she has used the Trulance and it works, refill is sent  Orders:  •  Plecanatide 3 MG TABS; Take 1 tablet by mouth daily    Encounter for screening mammogram for breast cancer    Orders:  •  Mammo screening bilateral w 3d and cad; Future    Adhesive capsulitis of right shoulder associated with type 2 diabetes mellitus  (HCC)  Still has some restricted movements advised to do continue regular home exercises  Lab Results   Component Value Date    HGBA1C 6.8 (H) 2025          Annual physical exam  Discussed about shingles vaccine, she will consider in the future       BMI 27.0-27.9,adult         Impacted cerumen of left ear  Left cerumen impaction, advised to use Debrox drops and come back next week for wax removal       Type 2 diabetes mellitus without complication, without long-term current use of insulin ([facility])  A1c slightly high, discussed about diet and medication if blood sugar does not improve next time the dose of medication can be adjusted  Lab Results   Component Value Date    HGBA1C 6.8 (H) 2025       Orders:  •  CBC and differential; Future  •  Comprehensive metabolic panel; Future  •  Hemoglobin A1C; Future  •  Lipid panel; Future    Gastric intestinal metaplasia  Follows GI regularly       Mixed hyperlipidemia  Cholesterol slightly increased as she is taking statin regularly, advised to take more regularly, she was concerned about muscles pains and she is not sure is due to medicine or she has intermittent muscle pain anywhere, advised to try to hold the medicine for 2 weeks and see if her muscle pains resolved then she will know for sure if the side effect from medication       Immunizations and preventive care  screenings were discussed with patient today. Appropriate education was printed on patient's after visit summary.    Counseling:  Dental Health: discussed importance of regular tooth brushing, flossing, and dental visits.  Exercise: the importance of regular exercise/physical activity was discussed. Recommend exercise 3-5 times per week for at least 30 minutes.          History of Present Illness     Adult Annual Physical:  Patient presents for annual physical. Physical and follow-up, complains of cerumen impaction in the left ear as she was using the Q-tip and she pushed the wax, constipation long-term problem follows GI and needs a refill on Trulance  History of iron deficiency anemia finished 7 IV infusions for iron.  Hemoglobin improved  Hyperlipidemia takes statins irregularly.     Diet and Physical Activity:  - Diet/Nutrition: well balanced diet.  - Exercise: walking.    Depression Screening:  - PHQ-2 Score: 0    General Health:  - Sleep: sleeps well.  - Vision: no vision problems.  - Dental: regular dental visits.    /GYN Health:    - Menopause: perimenopausal.     Review of Systems   Constitutional:  Negative for activity change, appetite change, chills, fatigue, fever and unexpected weight change.   HENT:  Negative for congestion, ear discharge, ear pain, nosebleeds, postnasal drip, rhinorrhea, sinus pressure, sneezing, sore throat, trouble swallowing and voice change.    Eyes:  Negative for photophobia, pain, discharge, redness and itching.   Respiratory:  Negative for cough, chest tightness, shortness of breath and wheezing.    Cardiovascular:  Negative for chest pain, palpitations and leg swelling.   Gastrointestinal:  Negative for abdominal pain, constipation, diarrhea, nausea and vomiting.   Endocrine: Negative for polyuria.   Genitourinary:  Negative for dysuria, frequency and urgency.   Musculoskeletal:  Negative for arthralgias, back pain, myalgias and neck pain.   Skin:  Negative for color  "change, pallor and rash.   Allergic/Immunologic: Negative for environmental allergies and food allergies.   Neurological:  Negative for dizziness, weakness, light-headedness and headaches.   Hematological:  Negative for adenopathy. Does not bruise/bleed easily.   Psychiatric/Behavioral:  Negative for behavioral problems. The patient is not nervous/anxious.          Objective   /70   Pulse 88   Resp 16   Ht 5' 2\" (1.575 m)   Wt 67.6 kg (149 lb)   SpO2 97%   BMI 27.25 kg/m²     Physical Exam  Vitals and nursing note reviewed.   Constitutional:       General: She is not in acute distress.     Appearance: Normal appearance. She is not ill-appearing.   HENT:      Head: Normocephalic and atraumatic.      Right Ear: Tympanic membrane and ear canal normal.      Left Ear: Tympanic membrane and ear canal normal. There is impacted cerumen.      Nose: Nose normal. No congestion or rhinorrhea.      Mouth/Throat:      Mouth: Mucous membranes are moist.      Pharynx: Oropharynx is clear. No oropharyngeal exudate or posterior oropharyngeal erythema.   Eyes:      General: No scleral icterus.        Right eye: No discharge.         Left eye: No discharge.      Extraocular Movements: Extraocular movements intact.      Conjunctiva/sclera: Conjunctivae normal.      Pupils: Pupils are equal, round, and reactive to light.   Cardiovascular:      Rate and Rhythm: Normal rate and regular rhythm.      Heart sounds: Normal heart sounds. No murmur heard.  Pulmonary:      Effort: Pulmonary effort is normal.      Breath sounds: Normal breath sounds. No wheezing or rales.   Abdominal:      General: Abdomen is flat. Bowel sounds are normal. There is no distension.      Palpations: Abdomen is soft. There is no mass.      Tenderness: There is no abdominal tenderness.   Musculoskeletal:         General: No swelling, tenderness or deformity. Normal range of motion.      Cervical back: Normal range of motion and neck supple. No muscular " tenderness.      Right lower leg: No edema.      Left lower leg: No edema.   Lymphadenopathy:      Cervical: No cervical adenopathy.   Skin:     Coloration: Skin is not jaundiced or pale.      Findings: No erythema, lesion or rash.   Neurological:      General: No focal deficit present.      Mental Status: She is alert and oriented to person, place, and time.      Gait: Gait normal.   Psychiatric:         Mood and Affect: Mood normal.         Behavior: Behavior normal.

## 2025-03-03 NOTE — ASSESSMENT & PLAN NOTE
Previously she has used the Trulance and it works, refill is sent  Orders:  •  Plecanatide 3 MG TABS; Take 1 tablet by mouth daily

## 2025-03-03 NOTE — PATIENT INSTRUCTIONS
"Patient Education     Routine physical for adults   The Basics   Written by the doctors and editors at [facility]   What is a physical? -- A physical is a routine visit, or \"check-up,\" with your doctor. You might also hear it called a \"wellness visit\" or \"preventive visit.\"  During each visit, the doctor will:   Ask about your physical and mental health   Ask about your habits, behaviors, and lifestyle   Do an exam   Give you vaccines if needed   Talk to you about any medicines you take   Give advice about your health   Answer your questions  Getting regular check-ups is an important part of taking care of your health. It can help your doctor find and treat any problems you have. But it's also important for preventing health problems.  A routine physical is different from a \"sick visit.\" A sick visit is when you see a doctor because of a health concern or problem. Since physicals are scheduled ahead of time, you can think about what you want to ask the doctor.  How often should I get a physical? -- It depends on your age and health. In general, for people age 21 years and older:   If you are younger than 50 years, you might be able to get a physical every 3 years.   If you are 50 years or older, your doctor might recommend a physical every year.  If you have an ongoing health condition, like diabetes or high blood pressure, your doctor will probably want to see you more often.  What happens during a physical? -- In general, each visit will include:   Physical exam - The doctor or nurse will check your height, weight, heart rate, and blood pressure. They will also look at your eyes and ears. They will ask about how you are feeling and whether you have any symptoms that bother you.   Medicines - It's a good idea to bring a list of all the medicines you take to each doctor visit. Your doctor will talk to you about your medicines and answer any questions. Tell them if you are having any side effects that bother you. You " "should also tell them if you are having trouble paying for any of your medicines.   Habits and behaviors - This includes:   Your diet   Your exercise habits   Whether you smoke, drink alcohol, or use drugs   Whether you are sexually active   Whether you feel safe at home  Your doctor will talk to you about things you can do to improve your health and lower your risk of health problems. They will also offer help and support. For example, if you want to quit smoking, they can give you advice and might prescribe medicines. If you want to improve your diet or get more physical activity, they can help you with this, too.   Lab tests, if needed - The tests you get will depend on your age and situation. For example, your doctor might want to check your:   Cholesterol   Blood sugar   Iron level   Vaccines - The recommended vaccines will depend on your age, health, and what vaccines you already had. Vaccines are very important because they can prevent certain serious or deadly infections.   Discussion of screening - \"Screening\" means checking for diseases or other health problems before they cause symptoms. Your doctor can recommend screening based on your age, risk, and preferences. This might include tests to check for:   Cancer, such as breast, prostate, cervical, ovarian, colorectal, prostate, lung, or skin cancer   Sexually transmitted infections, such as chlamydia and gonorrhea   Mental health conditions like depression and anxiety  Your doctor will talk to you about the different types of screening tests. They can help you decide which screenings to have. They can also explain what the results might mean.   Answering questions - The physical is a good time to ask the doctor or nurse questions about your health. If needed, they can refer you to other doctors or specialists, too.  Adults older than 65 years often need other care, too. As you get older, your doctor will talk to you about:   How to prevent falling at " home   Hearing or vision tests   Memory testing   How to take your medicines safely   Making sure that you have the help and support you need at home  All topics are updated as new evidence becomes available and our peer review process is complete.  This topic retrieved from [employer] app on: May 02, 2024.  Topic 473738 Version 1.0  Release: 32.4.3 - C32.122  © 2024 UpToDate, Inc. and/or its affiliates. All rights reserved.  Consumer Information Use and Disclaimer   Disclaimer: This generalized information is a limited summary of diagnosis, treatment, and/or medication information. It is not meant to be comprehensive and should be used as a tool to help the user understand and/or assess potential diagnostic and treatment options. It does NOT include all information about conditions, treatments, medications, side effects, or risks that may apply to a specific patient. It is not intended to be medical advice or a substitute for the medical advice, diagnosis, or treatment of a health care provider based on the health care provider's examination and assessment of a patient's specific and unique circumstances. Patients must speak with a health care provider for complete information about their health, medical questions, and treatment options, including any risks or benefits regarding use of medications. This information does not endorse any treatments or medications as safe, effective, or approved for treating a specific patient. UpToDate, Inc. and its affiliates disclaim any warranty or liability relating to this information or the use thereof.The use of this information is governed by the Terms of Use, available at https://www.woltersBrainswayuwer.com/en/know/clinical-effectiveness-terms. 2024© UpToDate, Inc. and its affiliates and/or licensors. All rights reserved.  Copyright   © 2024 Up[employer]Date, Inc. and/or its affiliates. All rights reserved.

## 2025-03-03 NOTE — ASSESSMENT & PLAN NOTE
Cholesterol slightly increased as she is taking statin regularly, advised to take more regularly, she was concerned about muscles pains and she is not sure is due to medicine or she has intermittent muscle pain anywhere, advised to try to hold the medicine for 2 weeks and see if her muscle pains resolved then she will know for sure if the side effect from medication

## 2025-03-05 ENCOUNTER — TELEPHONE (OUTPATIENT)
Dept: FAMILY MEDICINE CLINIC | Facility: CLINIC | Age: 50
End: 2025-03-05

## 2025-03-05 NOTE — TELEPHONE ENCOUNTER
[facility] for Clinical review for medication Trulance Tablet 3mg.      Scanned in.

## 2025-03-06 NOTE — TELEPHONE ENCOUNTER
PA for plecanatide/trulance 3mg SUBMITTED to   via    [x][facility]-KEY: [id number]  []Surescripts-Case ID #   []Availity-Auth ID # NDC [id number]   []Faxed to plan   []Other website   []Phone call Case ID #     [x]PA sent as URGENT    All office notes, labs and other pertaining documents and studies sent. Clinical questions answered. Awaiting determination from insurance company.     Turnaround time for your insurance to make a decision on your Prior Authorization can take 7-21 business days.

## 2025-04-02 PROBLEM — H61.22 IMPACTED CERUMEN OF LEFT EAR: Status: RESOLVED | Noted: 2019-01-10 | Resolved: 2025-04-02

## 2025-05-27 DIAGNOSIS — E11.9 TYPE 2 DIABETES MELLITUS WITHOUT COMPLICATION, WITHOUT LONG-TERM CURRENT USE OF INSULIN (HCC): ICD-10-CM

## 2025-07-28 ENCOUNTER — OFFICE VISIT (OUTPATIENT)
Dept: FAMILY MEDICINE CLINIC | Facility: CLINIC | Age: 50
End: 2025-07-28
Payer: COMMERCIAL

## 2025-07-28 VITALS
BODY MASS INDEX: 26.68 KG/M2 | SYSTOLIC BLOOD PRESSURE: 118 MMHG | WEIGHT: 145 LBS | DIASTOLIC BLOOD PRESSURE: 70 MMHG | HEIGHT: 62 IN | RESPIRATION RATE: 16 BRPM | OXYGEN SATURATION: 98 % | HEART RATE: 98 BPM

## 2025-07-28 DIAGNOSIS — M25.562 LEFT KNEE PAIN, UNSPECIFIED CHRONICITY: ICD-10-CM

## 2025-07-28 DIAGNOSIS — E11.9 TYPE 2 DIABETES MELLITUS WITHOUT COMPLICATION, WITHOUT LONG-TERM CURRENT USE OF INSULIN (HCC): ICD-10-CM

## 2025-07-28 DIAGNOSIS — S16.1XXA STRAIN OF NECK MUSCLE, INITIAL ENCOUNTER: Primary | ICD-10-CM

## 2025-07-28 PROCEDURE — 99214 OFFICE O/P EST MOD 30 MIN: CPT | Performed by: FAMILY MEDICINE

## 2025-07-28 RX ORDER — CYCLOBENZAPRINE HCL 5 MG
5 TABLET ORAL
Qty: 20 TABLET | Refills: 0 | Status: SHIPPED | OUTPATIENT
Start: 2025-07-28

## 2025-07-28 RX ORDER — CYCLOBENZAPRINE HCL 5 MG
5 TABLET ORAL 3 TIMES DAILY PRN
Qty: 20 TABLET | Refills: 0 | Status: SHIPPED | OUTPATIENT
Start: 2025-07-28 | End: 2025-07-28 | Stop reason: SDUPTHER

## 2025-08-07 ENCOUNTER — HOSPITAL ENCOUNTER (OUTPATIENT)
Dept: RADIOLOGY | Facility: HOSPITAL | Age: 50
Discharge: HOME | End: 2025-08-07
Payer: COMMERCIAL

## 2025-08-07 ENCOUNTER — APPOINTMENT (OUTPATIENT)
Dept: LAB | Facility: CLINIC | Age: 50
End: 2025-08-07
Payer: COMMERCIAL

## 2025-08-07 DIAGNOSIS — M25.562 LEFT KNEE PAIN, UNSPECIFIED CHRONICITY: ICD-10-CM

## 2025-08-07 PROCEDURE — 73562 X-RAY EXAM OF KNEE 3: CPT

## 2025-08-19 ENCOUNTER — OFFICE VISIT (OUTPATIENT)
Dept: FAMILY MEDICINE CLINIC | Facility: CLINIC | Age: 50
End: 2025-08-19
Payer: COMMERCIAL

## 2025-08-19 VITALS
OXYGEN SATURATION: 96 % | WEIGHT: 147 LBS | DIASTOLIC BLOOD PRESSURE: 70 MMHG | SYSTOLIC BLOOD PRESSURE: 118 MMHG | BODY MASS INDEX: 27.05 KG/M2 | HEIGHT: 62 IN | HEART RATE: 84 BPM | RESPIRATION RATE: 16 BRPM

## 2025-08-19 DIAGNOSIS — Z11.1 SCREENING-PULMONARY TB: ICD-10-CM

## 2025-08-19 DIAGNOSIS — E11.9 TYPE 2 DIABETES MELLITUS WITHOUT COMPLICATION, WITHOUT LONG-TERM CURRENT USE OF INSULIN (HCC): ICD-10-CM

## 2025-08-19 DIAGNOSIS — E78.2 MIXED HYPERLIPIDEMIA: ICD-10-CM

## 2025-08-19 DIAGNOSIS — M25.562 LEFT KNEE PAIN, UNSPECIFIED CHRONICITY: Primary | ICD-10-CM

## 2025-08-19 PROCEDURE — 99214 OFFICE O/P EST MOD 30 MIN: CPT | Performed by: FAMILY MEDICINE

## 2025-08-19 RX ORDER — ATORVASTATIN CALCIUM 10 MG/1
10 TABLET, FILM COATED ORAL DAILY
Qty: 90 TABLET | Refills: 1 | Status: SHIPPED | OUTPATIENT
Start: 2025-08-19

## 2025-08-19 RX ORDER — LOSARTAN POTASSIUM 25 MG/1
25 TABLET ORAL DAILY
Qty: 90 TABLET | Refills: 1 | Status: SHIPPED | OUTPATIENT
Start: 2025-08-19